# Patient Record
Sex: MALE | Race: WHITE | NOT HISPANIC OR LATINO | URBAN - METROPOLITAN AREA
[De-identification: names, ages, dates, MRNs, and addresses within clinical notes are randomized per-mention and may not be internally consistent; named-entity substitution may affect disease eponyms.]

---

## 2023-03-30 ENCOUNTER — HOSPITAL ENCOUNTER (INPATIENT)
Facility: HOSPITAL | Age: 73
LOS: 1 days | Discharge: HOME HEALTH | DRG: 563 | End: 2023-03-31
Attending: EMERGENCY MEDICINE | Admitting: HOSPITALIST
Payer: MEDICARE

## 2023-03-30 DIAGNOSIS — S42.202A: ICD-10-CM

## 2023-03-30 DIAGNOSIS — S42.355A CLOSED NONDISPLACED COMMINUTED FRACTURE OF SHAFT OF LEFT HUMERUS, INITIAL ENCOUNTER: Primary | ICD-10-CM

## 2023-03-30 DIAGNOSIS — G47.30 SLEEP APNEA IN ADULT: ICD-10-CM
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
## 2023-03-30 DIAGNOSIS — R07.9 CHEST PAIN: ICD-10-CM

## 2023-03-30 LAB
ALBUMIN SERPL BCP-MCNC: 4.2 G/DL (ref 3.5–5.2)
ALP SERPL-CCNC: 121 U/L (ref 55–135)
ALT SERPL W/O P-5'-P-CCNC: 31 U/L (ref 10–44)
ANION GAP SERPL CALC-SCNC: 14 MMOL/L (ref 8–16)
AST SERPL-CCNC: 25 U/L (ref 10–40)
BASOPHILS # BLD AUTO: 0.14 K/UL (ref 0–0.2)
BASOPHILS NFR BLD: 0.7 % (ref 0–1.9)
BILIRUB SERPL-MCNC: 0.5 MG/DL (ref 0.1–1)
BUN SERPL-MCNC: 16 MG/DL (ref 8–23)
CALCIUM SERPL-MCNC: 10.2 MG/DL (ref 8.7–10.5)
CHLORIDE SERPL-SCNC: 103 MMOL/L (ref 95–110)
CO2 SERPL-SCNC: 25 MMOL/L (ref 23–29)
CREAT SERPL-MCNC: 0.9 MG/DL (ref 0.5–1.4)
DIFFERENTIAL METHOD: ABNORMAL
EOSINOPHIL # BLD AUTO: 0.1 K/UL (ref 0–0.5)
EOSINOPHIL NFR BLD: 0.2 % (ref 0–8)
ERYTHROCYTE [DISTWIDTH] IN BLOOD BY AUTOMATED COUNT: 13.2 % (ref 11.5–14.5)
EST. GFR  (NO RACE VARIABLE): >60 ML/MIN/1.73 M^2
GLUCOSE SERPL-MCNC: 121 MG/DL (ref 70–110)
HCT VFR BLD AUTO: 49.7 % (ref 40–54)
HGB BLD-MCNC: 17.4 G/DL (ref 14–18)
IMM GRANULOCYTES # BLD AUTO: 0.17 K/UL (ref 0–0.04)
IMM GRANULOCYTES NFR BLD AUTO: 0.8 % (ref 0–0.5)
LYMPHOCYTES # BLD AUTO: 1.7 K/UL (ref 1–4.8)
LYMPHOCYTES NFR BLD: 8.2 % (ref 18–48)
MCH RBC QN AUTO: 32.3 PG (ref 27–31)
MCHC RBC AUTO-ENTMCNC: 35 G/DL (ref 32–36)
MCV RBC AUTO: 92 FL (ref 82–98)
MONOCYTES # BLD AUTO: 1.4 K/UL (ref 0.3–1)
MONOCYTES NFR BLD: 6.5 % (ref 4–15)
NEUTROPHILS # BLD AUTO: 17.7 K/UL (ref 1.8–7.7)
NEUTROPHILS NFR BLD: 83.6 % (ref 38–73)
NRBC BLD-RTO: 0 /100 WBC
PLATELET # BLD AUTO: 338 K/UL (ref 150–450)
PMV BLD AUTO: 9.8 FL (ref 9.2–12.9)
POTASSIUM SERPL-SCNC: 4 MMOL/L (ref 3.5–5.1)
PROT SERPL-MCNC: 9.3 G/DL (ref 6–8.4)
RBC # BLD AUTO: 5.39 M/UL (ref 4.6–6.2)
SODIUM SERPL-SCNC: 142 MMOL/L (ref 136–145)
WBC # BLD AUTO: 21.13 K/UL (ref 3.9–12.7)

## 2023-03-30 PROCEDURE — 94660 CPAP INITIATION&MGMT: CPT

## 2023-03-30 PROCEDURE — 11000001 HC ACUTE MED/SURG PRIVATE ROOM

## 2023-03-30 PROCEDURE — 99285 EMERGENCY DEPT VISIT HI MDM: CPT | Mod: 25

## 2023-03-30 PROCEDURE — 80053 COMPREHEN METABOLIC PANEL: CPT | Performed by: NURSE PRACTITIONER

## 2023-03-30 PROCEDURE — 25000003 PHARM REV CODE 250: Performed by: NURSE PRACTITIONER

## 2023-03-30 PROCEDURE — 99900035 HC TECH TIME PER 15 MIN (STAT)

## 2023-03-30 PROCEDURE — 96375 TX/PRO/DX INJ NEW DRUG ADDON: CPT

## 2023-03-30 PROCEDURE — 27000190 HC CPAP FULL FACE MASK W/VALVE

## 2023-03-30 PROCEDURE — 96374 THER/PROPH/DIAG INJ IV PUSH: CPT

## 2023-03-30 PROCEDURE — 63600175 PHARM REV CODE 636 W HCPCS: Performed by: NURSE PRACTITIONER

## 2023-03-30 PROCEDURE — 85025 COMPLETE CBC W/AUTO DIFF WBC: CPT | Performed by: NURSE PRACTITIONER

## 2023-03-30 RX ORDER — LISINOPRIL 40 MG/1
40 TABLET ORAL
COMMUNITY
Start: 2023-03-01

## 2023-03-30 RX ORDER — TRAZODONE HYDROCHLORIDE 50 MG/1
50 TABLET ORAL NIGHTLY
COMMUNITY
Start: 2023-01-03

## 2023-03-30 RX ORDER — AMLODIPINE BESYLATE 10 MG/1
10 TABLET ORAL
COMMUNITY
Start: 2023-03-01

## 2023-03-30 RX ORDER — MORPHINE SULFATE 4 MG/ML
4 INJECTION, SOLUTION INTRAMUSCULAR; INTRAVENOUS
Status: COMPLETED | OUTPATIENT
Start: 2023-03-30 | End: 2023-03-30

## 2023-03-30 RX ORDER — SODIUM CHLORIDE 0.9 % (FLUSH) 0.9 %
3 SYRINGE (ML) INJECTION EVERY 12 HOURS PRN
Status: DISCONTINUED | OUTPATIENT
Start: 2023-03-30 | End: 2023-03-31 | Stop reason: HOSPADM

## 2023-03-30 RX ORDER — AMOXICILLIN 250 MG
1 CAPSULE ORAL 2 TIMES DAILY PRN
Status: DISCONTINUED | OUTPATIENT
Start: 2023-03-30 | End: 2023-03-31 | Stop reason: HOSPADM

## 2023-03-30 RX ORDER — MORPHINE SULFATE 2 MG/ML
2 INJECTION, SOLUTION INTRAMUSCULAR; INTRAVENOUS EVERY 4 HOURS PRN
Status: DISCONTINUED | OUTPATIENT
Start: 2023-03-30 | End: 2023-03-31 | Stop reason: HOSPADM

## 2023-03-30 RX ORDER — ONDANSETRON 2 MG/ML
4 INJECTION INTRAMUSCULAR; INTRAVENOUS EVERY 8 HOURS PRN
Status: DISCONTINUED | OUTPATIENT
Start: 2023-03-30 | End: 2023-03-31 | Stop reason: HOSPADM

## 2023-03-30 RX ORDER — NALOXONE HCL 0.4 MG/ML
0.02 VIAL (ML) INJECTION
Status: DISCONTINUED | OUTPATIENT
Start: 2023-03-30 | End: 2023-03-31 | Stop reason: HOSPADM

## 2023-03-30 RX ORDER — LISINOPRIL 20 MG/1
40 TABLET ORAL DAILY
Status: DISCONTINUED | OUTPATIENT
Start: 2023-03-31 | End: 2023-03-31 | Stop reason: HOSPADM

## 2023-03-30 RX ORDER — DEXTROSE 40 %
15 GEL (GRAM) ORAL
Status: DISCONTINUED | OUTPATIENT
Start: 2023-03-30 | End: 2023-03-31 | Stop reason: HOSPADM

## 2023-03-30 RX ORDER — HYDROCODONE BITARTRATE AND ACETAMINOPHEN 10; 325 MG/1; MG/1
1 TABLET ORAL
Status: COMPLETED | OUTPATIENT
Start: 2023-03-30 | End: 2023-03-30

## 2023-03-30 RX ORDER — ATORVASTATIN CALCIUM 10 MG/1
20 TABLET, FILM COATED ORAL DAILY
Status: DISCONTINUED | OUTPATIENT
Start: 2023-03-31 | End: 2023-03-31 | Stop reason: HOSPADM

## 2023-03-30 RX ORDER — GLUCAGON 1 MG
1 KIT INJECTION
Status: DISCONTINUED | OUTPATIENT
Start: 2023-03-30 | End: 2023-03-31 | Stop reason: HOSPADM

## 2023-03-30 RX ORDER — HYDROCODONE BITARTRATE AND ACETAMINOPHEN 5; 325 MG/1; MG/1
1 TABLET ORAL EVERY 6 HOURS PRN
Status: DISCONTINUED | OUTPATIENT
Start: 2023-03-30 | End: 2023-03-31 | Stop reason: HOSPADM

## 2023-03-30 RX ORDER — ACETAMINOPHEN 325 MG/1
650 TABLET ORAL EVERY 8 HOURS PRN
Status: DISCONTINUED | OUTPATIENT
Start: 2023-03-30 | End: 2023-03-31 | Stop reason: HOSPADM

## 2023-03-30 RX ORDER — ATORVASTATIN CALCIUM 20 MG/1
1 TABLET, FILM COATED ORAL DAILY
COMMUNITY
Start: 2022-10-25

## 2023-03-30 RX ORDER — MAG HYDROX/ALUMINUM HYD/SIMETH 200-200-20
30 SUSPENSION, ORAL (FINAL DOSE FORM) ORAL 4 TIMES DAILY PRN
Status: DISCONTINUED | OUTPATIENT
Start: 2023-03-30 | End: 2023-03-31 | Stop reason: HOSPADM

## 2023-03-30 RX ORDER — ACETAMINOPHEN 650 MG/1
650 SUPPOSITORY RECTAL EVERY 6 HOURS PRN
Status: DISCONTINUED | OUTPATIENT
Start: 2023-03-30 | End: 2023-03-31 | Stop reason: HOSPADM

## 2023-03-30 RX ORDER — TALC
6 POWDER (GRAM) TOPICAL NIGHTLY PRN
Status: DISCONTINUED | OUTPATIENT
Start: 2023-03-30 | End: 2023-03-31 | Stop reason: HOSPADM

## 2023-03-30 RX ORDER — PROMETHAZINE HYDROCHLORIDE 25 MG/1
25 TABLET ORAL EVERY 6 HOURS PRN
Status: DISCONTINUED | OUTPATIENT
Start: 2023-03-30 | End: 2023-03-31 | Stop reason: HOSPADM

## 2023-03-30 RX ORDER — AMLODIPINE BESYLATE 10 MG/1
10 TABLET ORAL DAILY
Status: DISCONTINUED | OUTPATIENT
Start: 2023-03-31 | End: 2023-03-31 | Stop reason: HOSPADM

## 2023-03-30 RX ORDER — ONDANSETRON 2 MG/ML
4 INJECTION INTRAMUSCULAR; INTRAVENOUS
Status: COMPLETED | OUTPATIENT
Start: 2023-03-30 | End: 2023-03-30

## 2023-03-30 RX ORDER — DEXTROSE 40 %
30 GEL (GRAM) ORAL
Status: DISCONTINUED | OUTPATIENT
Start: 2023-03-30 | End: 2023-03-31 | Stop reason: HOSPADM

## 2023-03-30 RX ORDER — ATORVASTATIN CALCIUM 20 MG/1
20 TABLET, FILM COATED ORAL
COMMUNITY
Start: 2023-01-23

## 2023-03-30 RX ADMIN — HYDROCODONE BITARTRATE AND ACETAMINOPHEN 1 TABLET: 10; 325 TABLET ORAL at 06:03

## 2023-03-30 RX ADMIN — MORPHINE SULFATE 4 MG: 4 INJECTION INTRAVENOUS at 08:03

## 2023-03-30 RX ADMIN — ONDANSETRON 4 MG: 2 INJECTION INTRAMUSCULAR; INTRAVENOUS at 08:03

## 2023-03-30 NOTE — ED PROVIDER NOTES
Encounter Date: 3/30/2023       History     Chief Complaint   Patient presents with    Fall left shoulder pain     Per EMS pt had a slip trip fall, no LOC. Pt landed on his left shoulder. Pain rated 10/10 on movement, 3/10 at rest     Patient complains of left shoulder pain after a trip and fall at home.  Pain is worse with movement in the shoulder no gross deformity.  Denies mitigating factors not given anything prior to arrival.      Review of patient's allergies indicates:  No Known Allergies  No past medical history on file.  No past surgical history on file.  No family history on file.     Review of Systems   Constitutional:  Negative for fever.   HENT:  Negative for sore throat.    Respiratory:  Negative for shortness of breath.    Cardiovascular:  Negative for chest pain.   Gastrointestinal:  Negative for nausea.   Genitourinary:  Negative for dysuria.   Musculoskeletal:  Negative for back pain.   Skin:  Negative for rash.   Neurological:  Negative for weakness.   Hematological:  Does not bruise/bleed easily.     Physical Exam     Initial Vitals [03/30/23 1724]   BP Pulse Resp Temp SpO2   137/87 75 18 97.6 °F (36.4 °C) 96 %      MAP       --         Physical Exam    Nursing note and vitals reviewed.  Constitutional: He appears well-developed and well-nourished.   HENT:   Head: Normocephalic and atraumatic.   Eyes: Conjunctivae are normal. Pupils are equal, round, and reactive to light.   Neck: Neck supple.   Normal range of motion.  Cardiovascular:  Normal rate, regular rhythm, normal heart sounds and intact distal pulses.           Pulmonary/Chest: Breath sounds normal.   Abdominal: Abdomen is soft. There is no rebound and no guarding.   Musculoskeletal:         General: Normal range of motion.      Cervical back: Normal range of motion and neck supple.      Comments: Tenderness with range of motion left shoulder     Neurological: He is alert.   Skin: Skin is warm and dry.   Psychiatric: He has a normal mood  and affect. His behavior is normal. Thought content normal.       ED Course   Procedures  Labs Reviewed   CBC W/ AUTO DIFFERENTIAL - Abnormal; Notable for the following components:       Result Value    WBC 21.13 (*)     MCH 32.3 (*)     Immature Granulocytes 0.8 (*)     Gran # (ANC) 17.7 (*)     Immature Grans (Abs) 0.17 (*)     Mono # 1.4 (*)     Gran % 83.6 (*)     Lymph % 8.2 (*)     All other components within normal limits   COMPREHENSIVE METABOLIC PANEL - Abnormal; Notable for the following components:    Glucose 121 (*)     Total Protein 9.3 (*)     All other components within normal limits          Imaging Results              CT Shoulder Without Contrast Left (Final result)  Result time 03/30/23 19:30:18      Final result by Richar Arreola MD (03/30/23 19:30:18)                   Impression:      As above    All CT scans at this facility are performed  using dose modulation techniques as appropriate to performed exam including the following:  automated exposure control; adjustment of mA and/or kV according to the patients size (this includes techniques or standardized protocols for targeted exams where dose is matched to indication/reason for exam: i.e. extremities or head);  iterative reconstruction technique.      Electronically signed by: Richar Arreola  Date:    03/30/2023  Time:    19:30               Narrative:    EXAMINATION:  CT SHOULDER WITHOUT CONTRAST LEFT    CLINICAL HISTORY:  Fracture, shoulder;    TECHNIQUE:  CT of the left shoulder without    COMPARISON:  None    FINDINGS:  Comminuted fracture of the left humerus.  AC joint hypertrophy.  Glenoid is grossly unremarkable.                                       X-Ray Shoulder Trauma Left (Final result)  Result time 03/30/23 18:04:26      Final result by Richar Arreola MD (03/30/23 18:04:26)                   Impression:      As above      Electronically signed by: Richar Arreola  Date:    03/30/2023  Time:    18:04               Narrative:     EXAMINATION:  XR SHOULDER TRAUMA 3 VIEW LEFT    CLINICAL HISTORY:  Unspecified fall, initial encounter    TECHNIQUE:  Two or three views of the left shoulder were performed.    COMPARISON:  None    FINDINGS:  Comminuted fracture of the left humeral head.  AC joint hypertrophy.                                       Medications   HYDROcodone-acetaminophen  mg per tablet 1 tablet (1 tablet Oral Given 3/30/23 1838)   morphine injection 4 mg (4 mg Intravenous Given 3/30/23 2022)   ondansetron injection 4 mg (4 mg Intravenous Given 3/30/23 2020)     Medical Decision Making:   Secure chat with ortho on-call suggest that the patient should be hospitalized if he is unsafe at home.  The family I spoke with states that they are concerned about him falling again.  Patient admitted to hospital medicine for surgery consult tomorrow                        Clinical Impression:   Final diagnoses:  [W19.XXXA] Fall  [S42.355A] Closed nondisplaced comminuted fracture of shaft of left humerus, initial encounter (Primary)        ED Disposition Condition    Admit Stable                Shaun Lo NP  03/30/23 8118

## 2023-03-31 VITALS
WEIGHT: 226.88 LBS | RESPIRATION RATE: 17 BRPM | TEMPERATURE: 99 F | HEIGHT: 66 IN | BODY MASS INDEX: 36.46 KG/M2 | SYSTOLIC BLOOD PRESSURE: 140 MMHG | HEART RATE: 87 BPM | DIASTOLIC BLOOD PRESSURE: 77 MMHG | OXYGEN SATURATION: 93 %

## 2023-03-31 PROBLEM — E78.5 HLD (HYPERLIPIDEMIA): Status: ACTIVE | Noted: 2023-03-31

## 2023-03-31 PROBLEM — G47.30 SLEEP APNEA IN ADULT: Status: ACTIVE | Noted: 2023-03-31

## 2023-03-31 PROBLEM — S42.202A: Status: ACTIVE | Noted: 2023-03-31

## 2023-03-31 PROBLEM — E66.01 CLASS 2 SEVERE OBESITY DUE TO EXCESS CALORIES WITH SERIOUS COMORBIDITY AND BODY MASS INDEX (BMI) OF 36.0 TO 36.9 IN ADULT: Status: ACTIVE | Noted: 2023-03-31

## 2023-03-31 PROBLEM — I10 PRIMARY HYPERTENSION: Status: ACTIVE | Noted: 2023-03-31

## 2023-03-31 PROBLEM — E66.812 CLASS 2 SEVERE OBESITY DUE TO EXCESS CALORIES WITH SERIOUS COMORBIDITY AND BODY MASS INDEX (BMI) OF 36.0 TO 36.9 IN ADULT: Status: ACTIVE | Noted: 2023-03-31

## 2023-03-31 PROBLEM — S42.292A FRACTURE OF HUMERAL HEAD, CLOSED, LEFT, INITIAL ENCOUNTER: Status: ACTIVE | Noted: 2023-03-31

## 2023-03-31 LAB
ALBUMIN SERPL BCP-MCNC: 3.6 G/DL (ref 3.5–5.2)
ALP SERPL-CCNC: 94 U/L (ref 55–135)
ALT SERPL W/O P-5'-P-CCNC: 25 U/L (ref 10–44)
ANION GAP SERPL CALC-SCNC: 13 MMOL/L (ref 8–16)
AST SERPL-CCNC: 19 U/L (ref 10–40)
BASOPHILS # BLD AUTO: 0.1 K/UL (ref 0–0.2)
BASOPHILS NFR BLD: 0.7 % (ref 0–1.9)
BILIRUB SERPL-MCNC: 0.6 MG/DL (ref 0.1–1)
BNP SERPL-MCNC: 55 PG/ML (ref 0–99)
BUN SERPL-MCNC: 15 MG/DL (ref 8–23)
CALCIUM SERPL-MCNC: 9.2 MG/DL (ref 8.7–10.5)
CHLORIDE SERPL-SCNC: 107 MMOL/L (ref 95–110)
CO2 SERPL-SCNC: 23 MMOL/L (ref 23–29)
CREAT SERPL-MCNC: 0.9 MG/DL (ref 0.5–1.4)
DIFFERENTIAL METHOD: ABNORMAL
EOSINOPHIL # BLD AUTO: 0.1 K/UL (ref 0–0.5)
EOSINOPHIL NFR BLD: 0.3 % (ref 0–8)
ERYTHROCYTE [DISTWIDTH] IN BLOOD BY AUTOMATED COUNT: 13.2 % (ref 11.5–14.5)
EST. GFR  (NO RACE VARIABLE): >60 ML/MIN/1.73 M^2
GLUCOSE SERPL-MCNC: 112 MG/DL (ref 70–110)
HCT VFR BLD AUTO: 48.9 % (ref 40–54)
HGB BLD-MCNC: 16.7 G/DL (ref 14–18)
IMM GRANULOCYTES # BLD AUTO: 0.04 K/UL (ref 0–0.04)
IMM GRANULOCYTES NFR BLD AUTO: 0.3 % (ref 0–0.5)
LYMPHOCYTES # BLD AUTO: 2.3 K/UL (ref 1–4.8)
LYMPHOCYTES NFR BLD: 16.3 % (ref 18–48)
MCH RBC QN AUTO: 32 PG (ref 27–31)
MCHC RBC AUTO-ENTMCNC: 34.2 G/DL (ref 32–36)
MCV RBC AUTO: 94 FL (ref 82–98)
MONOCYTES # BLD AUTO: 1.2 K/UL (ref 0.3–1)
MONOCYTES NFR BLD: 8 % (ref 4–15)
NEUTROPHILS # BLD AUTO: 10.6 K/UL (ref 1.8–7.7)
NEUTROPHILS NFR BLD: 74.4 % (ref 38–73)
NRBC BLD-RTO: 0 /100 WBC
PLATELET # BLD AUTO: 285 K/UL (ref 150–450)
PMV BLD AUTO: 10.5 FL (ref 9.2–12.9)
POTASSIUM SERPL-SCNC: 3.9 MMOL/L (ref 3.5–5.1)
PROT SERPL-MCNC: 7.9 G/DL (ref 6–8.4)
RBC # BLD AUTO: 5.22 M/UL (ref 4.6–6.2)
SODIUM SERPL-SCNC: 143 MMOL/L (ref 136–145)
TROPONIN I SERPL DL<=0.01 NG/ML-MCNC: <0.006 NG/ML (ref 0–0.03)
WBC # BLD AUTO: 14.3 K/UL (ref 3.9–12.7)

## 2023-03-31 PROCEDURE — 99223 1ST HOSP IP/OBS HIGH 75: CPT | Mod: ,,, | Performed by: ORTHOPAEDIC SURGERY

## 2023-03-31 PROCEDURE — 36415 COLL VENOUS BLD VENIPUNCTURE: CPT | Performed by: HOSPITALIST

## 2023-03-31 PROCEDURE — 80053 COMPREHEN METABOLIC PANEL: CPT | Performed by: HOSPITALIST

## 2023-03-31 PROCEDURE — 84484 ASSAY OF TROPONIN QUANT: CPT | Performed by: HOSPITALIST

## 2023-03-31 PROCEDURE — 97116 GAIT TRAINING THERAPY: CPT

## 2023-03-31 PROCEDURE — 97162 PT EVAL MOD COMPLEX 30 MIN: CPT

## 2023-03-31 PROCEDURE — 97530 THERAPEUTIC ACTIVITIES: CPT

## 2023-03-31 PROCEDURE — 83880 ASSAY OF NATRIURETIC PEPTIDE: CPT | Performed by: HOSPITALIST

## 2023-03-31 PROCEDURE — 99223 PR INITIAL HOSPITAL CARE,LEVL III: ICD-10-PCS | Mod: ,,, | Performed by: ORTHOPAEDIC SURGERY

## 2023-03-31 PROCEDURE — 93005 ELECTROCARDIOGRAM TRACING: CPT

## 2023-03-31 PROCEDURE — 94761 N-INVAS EAR/PLS OXIMETRY MLT: CPT

## 2023-03-31 PROCEDURE — 99900035 HC TECH TIME PER 15 MIN (STAT)

## 2023-03-31 PROCEDURE — 85025 COMPLETE CBC W/AUTO DIFF WBC: CPT | Performed by: HOSPITALIST

## 2023-03-31 PROCEDURE — 93010 EKG 12-LEAD: ICD-10-PCS | Mod: ,,, | Performed by: INTERNAL MEDICINE

## 2023-03-31 PROCEDURE — 25000003 PHARM REV CODE 250: Performed by: HOSPITALIST

## 2023-03-31 PROCEDURE — 93010 ELECTROCARDIOGRAM REPORT: CPT | Mod: ,,, | Performed by: INTERNAL MEDICINE

## 2023-03-31 RX ORDER — HYDROCODONE BITARTRATE AND ACETAMINOPHEN 5; 325 MG/1; MG/1
1 TABLET ORAL EVERY 8 HOURS PRN
Qty: 15 TABLET | Refills: 0 | Status: SHIPPED | OUTPATIENT
Start: 2023-03-31

## 2023-03-31 RX ADMIN — HYDROCODONE BITARTRATE AND ACETAMINOPHEN 1 TABLET: 5; 325 TABLET ORAL at 04:03

## 2023-03-31 RX ADMIN — AMLODIPINE BESYLATE 10 MG: 10 TABLET ORAL at 08:03

## 2023-03-31 RX ADMIN — Medication 6 MG: at 01:03

## 2023-03-31 RX ADMIN — LISINOPRIL 40 MG: 20 TABLET ORAL at 08:03

## 2023-03-31 RX ADMIN — ATORVASTATIN CALCIUM 20 MG: 10 TABLET, FILM COATED ORAL at 08:03

## 2023-03-31 RX ADMIN — HYDROCODONE BITARTRATE AND ACETAMINOPHEN 1 TABLET: 5; 325 TABLET ORAL at 01:03

## 2023-03-31 RX ADMIN — HYDROCODONE BITARTRATE AND ACETAMINOPHEN 1 TABLET: 5; 325 TABLET ORAL at 08:03

## 2023-03-31 NOTE — PLAN OF CARE
EVAL AND TX COMPLETED: facilitated bed mobility with mod A, transfers with min A. Ambulated 45 ft x 2 min A with QC, 15ft x 2 min A with HHA, attempted gait with L PRW but unable to tolerate x 3 ft. Recommend 24 hr SPV, BSC, wheelchair. Also recommend that pt do not return to 2nd floor condo. MD and Marg[place] notified.

## 2023-03-31 NOTE — ASSESSMENT & PLAN NOTE
Currently normotensive.   Plan:  -Optimize pain control   -Continue home medications, titrate as needed   -Monitor BP  -Low salt/cardiac diet when not NPO  -IV hydralazine prn for SBP>160 or DBP>90

## 2023-03-31 NOTE — ASSESSMENT & PLAN NOTE
Patient is chronically on statin.will continue for now. Last Lipid Panel: No results found for: CHOL, HDL, LDLCALC, TRIG, CHOLHDL  Plan:  -Continue home medication  -low fat/low calorie diet

## 2023-03-31 NOTE — ASSESSMENT & PLAN NOTE
Patient presented following ground level fall and acute left shoulder pain was found to have sustained an acute traumatic comminuted fracture involving his left femur head noted on imaging.  Orthopedics consulted with recommendations to obtain further imaging via CT. Patient remains hemodynamically stable and neurovascularly intact.  Patient placed in sling and swath and awaiting further surgical recommendations pending morning rounds by Orthopedics and CT of left upper extremity.  Plan:  -NPO  -Continue current pain regimen, titrate as needed  -Bowel regimen  -Bedrest  -Wound care   -None weightbearing  -Continue NGT decompression  -Continue sling  -IVFs prn  -Antiemetics prn  -Tylenol as needed for fever   -f/u CT and orthopedics  -PT/OT

## 2023-03-31 NOTE — PLAN OF CARE
AAOX4. Pain is being managed on going. LUE is elevated in sling and on pillow. Pt remained free from fall and injury. No sign of any distress noted. Safety precautions alert. Pt asked to call for assistance if needed. IV access clean dry and intact saline locked. Chart checked reviewed. VSS. Plan of care continued.

## 2023-03-31 NOTE — CONSULTS
"  DATE OF INJURY 2023    CC     LEFT Shoulder pain     HPI    Mr. Mckenna is a 71y/o RHD retiree who we are asked to see regarding his left proximal humerus fracture.  His son Justin is at the bedside.    Yesterday morning he was seen in the cards office and had an echocardiogram.  His son reports they were told he may need a valve replacement in next few years.  His son states the only reason his father was out and about was for the doctor's appointment.     After the appointment they went out to lunch, and as they were leaving lunch he fell and landed on his left shoulder.      He has pain at the shoulder that does radiate along the arm to the elbow but not into the forearm.  Denies any paresthesias.  Does have a history of multiple dislocations of the left shoulder and had one within last 5 years.      He uses a cane in the house and in the community because of bilateral knee DJD.  He does have a full flight of stairs to get into his apartment.  He already has a lift chair recliner.       PAST MEDICAL HISTORY  HTN, ELIZABETH w/CPAP, "valve leak",  hyperlipidemia, b/l knee DJD     PAST SURGICAL HISTORY  Splenectomy at age 6     ALLERGIES      NKDA     MEDICATIONS    Takes baby ASA daily     SOCIAL HISTORY   Lives alone.  Quit drink 2 years ago.  Does not smoke or use illegal drugs.     FAMILY HISTORY  Father had CAD, prostate cancer, diabetes and is .     Mother  a few years ago and  of "old age"     REVIEW OF SYSTEMS:      GEN:   Denies fever, chills, malaise, unexpected weight changes.    HEENT: Denies headaches, diplopia, rhinnorea, epistaxis,          difficulty swallowing.    CV:    Denies hypertension, chest pain, CAD, MI, palpitations,         PVD  ENDO:  Denies diabetes, temperature intolerance  GI:    Denies reflux, nausea, vomiting, constipation, diarrhea.       Denies dysuria/nocturia/prostate problems  NEURO: Denies stroke, epilepsy/seizures, paralysis/paresis or         " "neuropathy.  PSYCH  Denies PTSD,depression and ADD  PULM:  Denies sleep apnea, shortness of breath, wheezing, cough,         hemoptysis, COPD, asthma  MSK:   See HPI. Denies arthritis, myalgias, fracture.     PHYSICAL EXAMINATION     Vitals:    03/31/23 0511 03/31/23 0731 03/31/23 0744 03/31/23 0842   BP: 131/73 131/73     BP Location: Right arm      Patient Position: Lying Lying     Pulse: 77 70 70    Resp: 18 15 16 17   Temp: 98.2 °F (36.8 °C) 98.3 °F (36.8 °C)     TempSrc: Oral Oral     SpO2: 96% (!) 94% (!) 94%    Weight: 102.9 kg (226 lb 13.7 oz)      Height:          GEN            NAD, well appearing     PSYCH          A&Ox3, pleasant and cooperative     LEFT UE   In sling.  Skin intact.  Moderate swelling.  No bruising at this time.  Arm/forearm soft.  NTTP about the elbow/forearm.  TTP about the proximal humerus.  Pain with attempted shoulder ROM.  Radial/ulnar/median nerves intact motor/sensory.  Cap refill     DIAGNOSTIC IMAGING  LEFT Shoulder Xr Personally reviewed.  Shows comminuted impacted multipart fracture of the proximal humerus.   AC joint DJD    LEFT Shoulder CT Scan  Shows glenohumeral joint concentrically reduced with mild loss of glenohumeral joint space.  Impacted, comminuted proximal humerus fracture again noted.      DISCUSSION  Diagnostic imaging, clinical findings, and patient's symptoms reviewed  and correlated.  Discussed non-operative treatments including sling, ice, ROM as pain decreases followed by PT.  Discussed operative interventions of shoulder replacement and it's post-operative course and restrictions.  Discussed that whether he elects for non-operative treatment or operative treatment, he will lose ROM of this shoulder but will be able to reach overhead and get hand to mouth.  Discussed how his medical conditions including "leaky valve" and ELIZABETH add to the risks of anesthesia and the recovery from surgery.  Discussed the only restriction during non-operative treatment is " "limiting activity by pain.      Pt and son given an opportunity to ask questions.  His son stated he would like non-operative treatment.     Will allow them to discuss and return later.      DIAGNOSIS:  1.  LEFT proximal humerus fracture, closed, displaced, initial  2.  ELIZABETH w/CPAP  3.  "Leaky valve"        PLAN:  1.  No surgery planned at this time  2.  Sling for comfort, may open to do ROM of elbow/wrist as pain allows.  3.  Patient encouraged to move hand/wrist as much as possible to help with swelling/stiffness  4.  Ice to left shoulder PRN pain  5.  Will follow         "

## 2023-03-31 NOTE — PROGRESS NOTES
"Patient seen in follow-up for review of treatment options for his left proximal humerus fracture.    His son Nirmal is at the bedside as is a sister.    Mr. Alvarez stated they've talked about it and he does NOT want surgery for the shoulder at this time.    I reviewed the injury and sling usage, that he can use ice for the pain/swelling.      He asked if he can use his voltaren gel and another topical cream to which I replied yes.     DIAGNOSIS:  1.  LEFT proximal humerus fracture, closed, displaced, initial  2.  ELIZABETH w/CPAP  3.  "Leaky valve"         PLAN:  1.  No surgery planned   2.  Sling for comfort, may open to do ROM of elbow/wrist as pain allows.  3.  Patient encouraged to move hand/wrist as much as possible to help with swelling/stiffness  4.  Ice to left shoulder PRN pain  5.  Will arrange follow-up in the [facility] in 2-3 weeks        [facility]      [facility] [address] [place] [address]      Phone [phone]      Fax [phone]    "

## 2023-03-31 NOTE — H&P
[facility] [place] [facility]  History & Physical    Patient Name: Rodolfo Alvarez  MRN: [id number]  Patient Class: IP- Inpatient  Admission Date: 3/30/2023  Attending Physician: Manuel Tian MD   Primary Care Provider: Primary Doctor No         Patient information was obtained from patient, past medical records and ER records.     Subjective:     Principal Problem:<principal problem not specified>    Chief Complaint:   Chief Complaint   Patient presents with    Fall left shoulder pain     Per EMS pt had a slip trip fall, no LOC. Pt landed on his left shoulder. Pain rated 10/10 on movement, 3/10 at rest        HPI: Rodolfo Alvarez is a 72 y.o. male with a PMH  has a past medical history of HLD (hyperlipidemia) and Hypertension. who presented to the ED with left shoulder pain following ground level fall earlier today.  Patient stated he was walking out of [employer] and fell onto his left shoulder after tripping and falling.  Patient denied endorsing any head trauma, loss of consciousness, and reported also endorsing abrasions on his upper extremities.  Patient reported endorsing immediate pain throughout his left shoulder described as hurting/throbbing in nature, constant, rated 10/10 in severity, radiating down his arm with no associated numbness, tingling, or discoloration but did have decreased range of motion secondary to pain.  All other review of systems negative except as noted above.  Prior to the incident, patient reported being in his usual state health no other concerns or complaints.  With orthopedics was consulted after patient was found to have sustained an acute traumatic left humeral head fracture noted on imaging and recommended admission to hospital medicine with further recommendations pending CT upper extremity.      PCP: Primary Doctor No        No past medical history on file.    No past surgical history on file.    Review of patient's allergies indicates:  No Known  Allergies    No current facility-administered medications on file prior to encounter.     Current Outpatient Medications on File Prior to Encounter   Medication Sig    atorvastatin (LIPITOR) 20 MG tablet Take 1 tablet by mouth once daily.    amLODIPine (NORVASC) 10 MG tablet Take 10 mg by mouth.    atorvastatin (LIPITOR) 20 MG tablet Take 20 mg by mouth.    lisinopriL (PRINIVIL,ZESTRIL) 40 MG tablet Take 40 mg by mouth.    traZODone (DESYREL) 50 MG tablet Take 50 mg by mouth every evening.     Family History    None       Tobacco Use    Smoking status: Not on file    Smokeless tobacco: Not on file   Substance and Sexual Activity    Alcohol use: Not on file    Drug use: Not on file    Sexual activity: Not on file     Review of Systems   All other systems reviewed and are negative.  Objective:     Vital Signs (Most Recent):  Temp: 98.4 °F (36.9 °C) (03/30/23 2145)  Pulse: 85 (03/30/23 2224)  Resp: 16 (03/31/23 0110)  BP: 137/73 (03/30/23 2145)  SpO2: 97 % (03/30/23 2224) Vital Signs (24h Range):  Temp:  [97.6 °F (36.4 °C)-98.4 °F (36.9 °C)] 98.4 °F (36.9 °C)  Pulse:  [75-85] 85  Resp:  [16-22] 16  SpO2:  [96 %-97 %] 97 %  BP: (137)/(73-87) 137/73     Weight: 103.3 kg (227 lb 11.8 oz)  Body mass index is 36.76 kg/m².    Physical Exam  Vitals reviewed.   Constitutional:       General: He is not in acute distress.     Appearance: Normal appearance. He is obese. He is not ill-appearing, toxic-appearing or diaphoretic.   HENT:      Head: Normocephalic and atraumatic.      Right Ear: External ear normal.      Left Ear: External ear normal.      Nose: Nose normal. No congestion or rhinorrhea.      Mouth/Throat:      Mouth: Mucous membranes are moist.      Pharynx: Oropharynx is clear. No oropharyngeal exudate or posterior oropharyngeal erythema.   Eyes:      General: No scleral icterus.     Extraocular Movements: Extraocular movements intact.      Conjunctiva/sclera: Conjunctivae normal.      Pupils: Pupils are  equal, round, and reactive to light.   Neck:      Vascular: No carotid bruit.   Cardiovascular:      Rate and Rhythm: Normal rate and regular rhythm.      Pulses: Normal pulses.      Heart sounds: Normal heart sounds. No murmur heard.    No friction rub. No gallop.   Pulmonary:      Effort: Pulmonary effort is normal. No respiratory distress.      Breath sounds: Normal breath sounds. No stridor. No wheezing, rhonchi or rales.   Chest:      Chest wall: No tenderness.   Abdominal:      General: Abdomen is flat. Bowel sounds are normal. There is no distension.      Palpations: Abdomen is soft. There is no mass.      Tenderness: There is no abdominal tenderness. There is no guarding or rebound.      Hernia: No hernia is present.   Musculoskeletal:         General: Tenderness and signs of injury present. No swelling or deformity.      Cervical back: Normal range of motion and neck supple. No rigidity or tenderness.      Comments: Decreased range of motion of left upper extremity with diffuse TTP throughout left shoulder.  Left upper extremity currently in sling/swath.   Lymphadenopathy:      Cervical: No cervical adenopathy.   Skin:     General: Skin is warm and dry.      Capillary Refill: Capillary refill takes less than 2 seconds.      Coloration: Skin is not jaundiced or pale.      Findings: Rash present. No bruising, erythema or lesion.      Comments: Abrasions noted on bilateral upper extremities   Neurological:      Mental Status: He is alert and oriented to person, place, and time. Mental status is at baseline.      Cranial Nerves: No cranial nerve deficit.      Sensory: No sensory deficit.      Motor: Weakness present.      Coordination: Coordination normal.      Comments: Patient with 5/5 strength on all extremities with exception of inability to fully assess left upper extremity secondary to exacerbation of pain given recent trauma.  Patient has intact left hand  strength.  Sensation to light touch grossly  intact throughout.   Psychiatric:         Mood and Affect: Mood normal.         Behavior: Behavior normal.         Thought Content: Thought content normal.         Judgment: Judgment normal.         CRANIAL NERVES     CN III, IV, VI   Pupils are equal, round, and reactive to light.     Significant Labs: All pertinent labs within the past 24 hours have been reviewed.    Significant Imaging: I have reviewed all pertinent imaging results/findings within the past 24 hours.    LABS:  Recent Results (from the past 24 hour(s))   CBC auto differential    Collection Time: 03/30/23  8:18 PM   Result Value Ref Range    WBC 21.13 (H) 3.90 - 12.70 K/uL    RBC 5.39 4.60 - 6.20 M/uL    Hemoglobin 17.4 14.0 - 18.0 g/dL    Hematocrit 49.7 40.0 - 54.0 %    MCV 92 82 - 98 fL    MCH 32.3 (H) 27.0 - 31.0 pg    MCHC 35.0 32.0 - 36.0 g/dL    RDW 13.2 11.5 - 14.5 %    Platelets 338 150 - 450 K/uL    MPV 9.8 9.2 - 12.9 fL    Immature Granulocytes 0.8 (H) 0.0 - 0.5 %    Gran # (ANC) 17.7 (H) 1.8 - 7.7 K/uL    Immature Grans (Abs) 0.17 (H) 0.00 - 0.04 K/uL    Lymph # 1.7 1.0 - 4.8 K/uL    Mono # 1.4 (H) 0.3 - 1.0 K/uL    Eos # 0.1 0.0 - 0.5 K/uL    Baso # 0.14 0.00 - 0.20 K/uL    nRBC 0 0 /100 WBC    Gran % 83.6 (H) 38.0 - 73.0 %    Lymph % 8.2 (L) 18.0 - 48.0 %    Mono % 6.5 4.0 - 15.0 %    Eosinophil % 0.2 0.0 - 8.0 %    Basophil % 0.7 0.0 - 1.9 %    Differential Method Automated    Comprehensive metabolic panel    Collection Time: 03/30/23  8:18 PM   Result Value Ref Range    Sodium 142 136 - 145 mmol/L    Potassium 4.0 3.5 - 5.1 mmol/L    Chloride 103 95 - 110 mmol/L    CO2 25 23 - 29 mmol/L    Glucose 121 (H) 70 - 110 mg/dL    BUN 16 8 - 23 mg/dL    Creatinine 0.9 0.5 - 1.4 mg/dL    Calcium 10.2 8.7 - 10.5 mg/dL    Total Protein 9.3 (H) 6.0 - 8.4 g/dL    Albumin 4.2 3.5 - 5.2 g/dL    Total Bilirubin 0.5 0.1 - 1.0 mg/dL    Alkaline Phosphatase 121 55 - 135 U/L    AST 25 10 - 40 U/L    ALT 31 10 - 44 U/L    Anion Gap 14 8 - 16 mmol/L     eGFR >60 >60 mL/min/1.73 m^2       RADIOLOGY  CT Shoulder Without Contrast Left    Result Date: 3/30/2023  EXAMINATION: CT SHOULDER WITHOUT CONTRAST LEFT CLINICAL HISTORY: Fracture, shoulder; TECHNIQUE: CT of the left shoulder without COMPARISON: None FINDINGS: Comminuted fracture of the left humerus.  AC joint hypertrophy.  Glenoid is grossly unremarkable.     As above All CT scans at this facility are performed  using dose modulation techniques as appropriate to performed exam including the following:  automated exposure control; adjustment of mA and/or kV according to the patients size (this includes techniques or standardized protocols for targeted exams where dose is matched to indication/reason for exam: i.e. extremities or head);  iterative reconstruction technique. Electronically signed by: Richar Arreola Date:    03/30/2023 Time:    19:30    X-Ray Shoulder Trauma Left    Result Date: 3/30/2023  EXAMINATION: XR SHOULDER TRAUMA 3 VIEW LEFT CLINICAL HISTORY: Unspecified fall, initial encounter TECHNIQUE: Two or three views of the left shoulder were performed. COMPARISON: None FINDINGS: Comminuted fracture of the left humeral head.  AC joint hypertrophy.     As above Electronically signed by: Richar Arreola Date:    03/30/2023 Time:    18:04      EKG    MICROBIOLOGY    MDM      Assessment/Plan:     Fracture of humeral head, closed, left, initial encounter  Patient presented following ground level fall and acute left shoulder pain was found to have sustained an acute traumatic comminuted fracture involving his left femur head noted on imaging.  Orthopedics consulted with recommendations to obtain further imaging via CT. Patient remains hemodynamically stable and neurovascularly intact.  Patient placed in sling and swath and awaiting further surgical recommendations pending morning rounds by Orthopedics and CT of left upper extremity.  Plan:  -NPO  -Continue current pain regimen, titrate as needed  -Bowel  regimen  -Bedrest  -Wound care   -None weightbearing  -Continue NGT decompression  -Continue sling  -IVFs prn  -Antiemetics prn  -Tylenol as needed for fever   -f/u CT and orthopedics  -PT/OT       Primary hypertension  Currently normotensive.   Plan:  -Optimize pain control   -Continue home medications, titrate as needed   -Monitor BP  -Low salt/cardiac diet when not NPO  -IV hydralazine prn for SBP>160 or DBP>90           HLD (hyperlipidemia)  Patient is chronically on statin.will continue for now. Last Lipid Panel: No results found for: CHOL, HDL, LDLCALC, TRIG, CHOLHDL  Plan:  -Continue home medication  -low fat/low calorie diet      Class 2 severe obesity due to excess calories with serious comorbidity and body mass index (BMI) of 36.0 to 36.9 in adult  Body mass index is 36.76 kg/m². Elevation likely secondary to increased calorie intake and sedentary lifestyle. Patient educated on morbidity and mortality in regards to elevated BMI and stressed importance of diet and exercise.   Plan:  -low fat/low calorie diet       VTE Risk Mitigation (From admission, onward)         Ordered     Reason for No Pharmacological VTE Prophylaxis  Once        Question:  Reasons:  Answer:  Physician Provided (leave comment)  Comment:  pending surgical intervention    03/30/23 2219     IP VTE HIGH RISK PATIENT  Once         03/30/23 2219     Place sequential compression device  Until discontinued         03/30/23 2219              //Core Measures   -DVT proph: SCDs, withholding anticoagulation pending surgical intervention   -Code status: Full    -Surrogate: spouse      Components of this note were documented using a voice recognition system and are subject to errors not corrected at the time the document was proof read. Please contact the author for any clarifications.        Manuel Tian MD  Department of Hospital Medicine  [facility]

## 2023-03-31 NOTE — HPI
Rodolfo Alvarez is a 72 y.o. male with a PMH  has a past medical history of HLD (hyperlipidemia) and Hypertension. who presented to the ED with left shoulder pain following ground level fall earlier today.  Patient stated he was walking out of [employer] and fell onto his left shoulder after tripping and falling.  Patient denied endorsing any head trauma, loss of consciousness, and reported also endorsing abrasions on his upper extremities.  Patient reported endorsing immediate pain throughout his left shoulder described as hurting/throbbing in nature, constant, rated 10/10 in severity, radiating down his arm with no associated numbness, tingling, or discoloration but did have decreased range of motion secondary to pain.  All other review of systems negative except as noted above.  Prior to the incident, patient reported being in his usual state health no other concerns or complaints.  With orthopedics was consulted after patient was found to have sustained an acute traumatic left humeral head fracture noted on imaging and recommended admission to hospital medicine with further recommendations pending CT upper extremity.      PCP: Primary Doctor No

## 2023-03-31 NOTE — SUBJECTIVE & OBJECTIVE
No past medical history on file.    No past surgical history on file.    Review of patient's allergies indicates:  No Known Allergies    No current facility-administered medications on file prior to encounter.     Current Outpatient Medications on File Prior to Encounter   Medication Sig    atorvastatin (LIPITOR) 20 MG tablet Take 1 tablet by mouth once daily.    amLODIPine (NORVASC) 10 MG tablet Take 10 mg by mouth.    atorvastatin (LIPITOR) 20 MG tablet Take 20 mg by mouth.    lisinopriL (PRINIVIL,ZESTRIL) 40 MG tablet Take 40 mg by mouth.    traZODone (DESYREL) 50 MG tablet Take 50 mg by mouth every evening.     Family History    None       Tobacco Use    Smoking status: Not on file    Smokeless tobacco: Not on file   Substance and Sexual Activity    Alcohol use: Not on file    Drug use: Not on file    Sexual activity: Not on file     Review of Systems   All other systems reviewed and are negative.  Objective:     Vital Signs (Most Recent):  Temp: 98.4 °F (36.9 °C) (03/30/23 2145)  Pulse: 85 (03/30/23 2224)  Resp: 16 (03/31/23 0110)  BP: 137/73 (03/30/23 2145)  SpO2: 97 % (03/30/23 2224) Vital Signs (24h Range):  Temp:  [97.6 °F (36.4 °C)-98.4 °F (36.9 °C)] 98.4 °F (36.9 °C)  Pulse:  [75-85] 85  Resp:  [16-22] 16  SpO2:  [96 %-97 %] 97 %  BP: (137)/(73-87) 137/73     Weight: 103.3 kg (227 lb 11.8 oz)  Body mass index is 36.76 kg/m².    Physical Exam  Vitals reviewed.   Constitutional:       General: He is not in acute distress.     Appearance: Normal appearance. He is obese. He is not ill-appearing, toxic-appearing or diaphoretic.   HENT:      Head: Normocephalic and atraumatic.      Right Ear: External ear normal.      Left Ear: External ear normal.      Nose: Nose normal. No congestion or rhinorrhea.      Mouth/Throat:      Mouth: Mucous membranes are moist.      Pharynx: Oropharynx is clear. No oropharyngeal exudate or posterior oropharyngeal erythema.   Eyes:      General: No scleral icterus.     Extraocular  Movements: Extraocular movements intact.      Conjunctiva/sclera: Conjunctivae normal.      Pupils: Pupils are equal, round, and reactive to light.   Neck:      Vascular: No carotid bruit.   Cardiovascular:      Rate and Rhythm: Normal rate and regular rhythm.      Pulses: Normal pulses.      Heart sounds: Normal heart sounds. No murmur heard.    No friction rub. No gallop.   Pulmonary:      Effort: Pulmonary effort is normal. No respiratory distress.      Breath sounds: Normal breath sounds. No stridor. No wheezing, rhonchi or rales.   Chest:      Chest wall: No tenderness.   Abdominal:      General: Abdomen is flat. Bowel sounds are normal. There is no distension.      Palpations: Abdomen is soft. There is no mass.      Tenderness: There is no abdominal tenderness. There is no guarding or rebound.      Hernia: No hernia is present.   Musculoskeletal:         General: Tenderness and signs of injury present. No swelling or deformity.      Cervical back: Normal range of motion and neck supple. No rigidity or tenderness.      Comments: Decreased range of motion of left upper extremity with diffuse TTP throughout left shoulder.  Left upper extremity currently in sling/swath.   Lymphadenopathy:      Cervical: No cervical adenopathy.   Skin:     General: Skin is warm and dry.      Capillary Refill: Capillary refill takes less than 2 seconds.      Coloration: Skin is not jaundiced or pale.      Findings: Rash present. No bruising, erythema or lesion.      Comments: Abrasions noted on bilateral upper extremities   Neurological:      Mental Status: He is alert and oriented to person, place, and time. Mental status is at baseline.      Cranial Nerves: No cranial nerve deficit.      Sensory: No sensory deficit.      Motor: Weakness present.      Coordination: Coordination normal.      Comments: Patient with 5/5 strength on all extremities with exception of inability to fully assess left upper extremity secondary to  exacerbation of pain given recent trauma.  Patient has intact left hand  strength.  Sensation to light touch grossly intact throughout.   Psychiatric:         Mood and Affect: Mood normal.         Behavior: Behavior normal.         Thought Content: Thought content normal.         Judgment: Judgment normal.         CRANIAL NERVES     CN III, IV, VI   Pupils are equal, round, and reactive to light.     Significant Labs: All pertinent labs within the past 24 hours have been reviewed.    Significant Imaging: I have reviewed all pertinent imaging results/findings within the past 24 hours.    LABS:  Recent Results (from the past 24 hour(s))   CBC auto differential    Collection Time: 03/30/23  8:18 PM   Result Value Ref Range    WBC 21.13 (H) 3.90 - 12.70 K/uL    RBC 5.39 4.60 - 6.20 M/uL    Hemoglobin 17.4 14.0 - 18.0 g/dL    Hematocrit 49.7 40.0 - 54.0 %    MCV 92 82 - 98 fL    MCH 32.3 (H) 27.0 - 31.0 pg    MCHC 35.0 32.0 - 36.0 g/dL    RDW 13.2 11.5 - 14.5 %    Platelets 338 150 - 450 K/uL    MPV 9.8 9.2 - 12.9 fL    Immature Granulocytes 0.8 (H) 0.0 - 0.5 %    Gran # (ANC) 17.7 (H) 1.8 - 7.7 K/uL    Immature Grans (Abs) 0.17 (H) 0.00 - 0.04 K/uL    Lymph # 1.7 1.0 - 4.8 K/uL    Mono # 1.4 (H) 0.3 - 1.0 K/uL    Eos # 0.1 0.0 - 0.5 K/uL    Baso # 0.14 0.00 - 0.20 K/uL    nRBC 0 0 /100 WBC    Gran % 83.6 (H) 38.0 - 73.0 %    Lymph % 8.2 (L) 18.0 - 48.0 %    Mono % 6.5 4.0 - 15.0 %    Eosinophil % 0.2 0.0 - 8.0 %    Basophil % 0.7 0.0 - 1.9 %    Differential Method Automated    Comprehensive metabolic panel    Collection Time: 03/30/23  8:18 PM   Result Value Ref Range    Sodium 142 136 - 145 mmol/L    Potassium 4.0 3.5 - 5.1 mmol/L    Chloride 103 95 - 110 mmol/L    CO2 25 23 - 29 mmol/L    Glucose 121 (H) 70 - 110 mg/dL    BUN 16 8 - 23 mg/dL    Creatinine 0.9 0.5 - 1.4 mg/dL    Calcium 10.2 8.7 - 10.5 mg/dL    Total Protein 9.3 (H) 6.0 - 8.4 g/dL    Albumin 4.2 3.5 - 5.2 g/dL    Total Bilirubin 0.5 0.1 - 1.0 mg/dL     Alkaline Phosphatase 121 55 - 135 U/L    AST 25 10 - 40 U/L    ALT 31 10 - 44 U/L    Anion Gap 14 8 - 16 mmol/L    eGFR >60 >60 mL/min/1.73 m^2       RADIOLOGY  CT Shoulder Without Contrast Left    Result Date: 3/30/2023  EXAMINATION: CT SHOULDER WITHOUT CONTRAST LEFT CLINICAL HISTORY: Fracture, shoulder; TECHNIQUE: CT of the left shoulder without COMPARISON: None FINDINGS: Comminuted fracture of the left humerus.  AC joint hypertrophy.  Glenoid is grossly unremarkable.     As above All CT scans at this facility are performed  using dose modulation techniques as appropriate to performed exam including the following:  automated exposure control; adjustment of mA and/or kV according to the patients size (this includes techniques or standardized protocols for targeted exams where dose is matched to indication/reason for exam: i.e. extremities or head);  iterative reconstruction technique. Electronically signed by: Richar Arreola Date:    03/30/2023 Time:    19:30    X-Ray Shoulder Trauma Left    Result Date: 3/30/2023  EXAMINATION: XR SHOULDER TRAUMA 3 VIEW LEFT CLINICAL HISTORY: Unspecified fall, initial encounter TECHNIQUE: Two or three views of the left shoulder were performed. COMPARISON: None FINDINGS: Comminuted fracture of the left humeral head.  AC joint hypertrophy.     As above Electronically signed by: Richar Arreola Date:    03/30/2023 Time:    18:04      EKG    MICROBIOLOGY    MDM

## 2023-03-31 NOTE — PLAN OF CARE
[facility]  Discharge Final Note    Primary Care Provider: Primary Doctor No    Expected Discharge Date: 3/31/2023    Final Discharge Note (most recent)       Final Note - 03/31/23 1128          Final Note    Assessment Type Final Discharge Note     Anticipated Discharge Disposition Home-Health Care Svc        Post-Acute Status    Discharge Delays None known at this time                     Important Message from Medicare             Contact Info       Lola De Luna MD   Specialty: Interventional Cardiology, Cardiology, Cardiovascular Disease    [facility]  [address]  [address]  [facility] [address]   Phone: [phone]       Next Steps: Schedule an appointment as soon as possible for a visit in 2 week(s)    Instructions: Established patient, hospital discharge follow up    RAJESH PARR DO   Specialty: Orthopedic Surgery    [address] [place] [address] [facility]  [facility] [address]   Phone: [phone]       Next Steps: Schedule an appointment as soon as possible for a visit in 2 week(s)          Home health arranged with lidia Keane ordered with [facility].

## 2023-03-31 NOTE — ASSESSMENT & PLAN NOTE
Body mass index is 36.76 kg/m². Elevation likely secondary to increased calorie intake and sedentary lifestyle. Patient educated on morbidity and mortality in regards to elevated BMI and stressed importance of diet and exercise.   Plan:  -low fat/low calorie diet

## 2023-03-31 NOTE — PT/OT/SLP EVAL
"Physical Therapy Evaluation    Patient Name:  Rodolfo Alvarez   MRN:  [id number]    Recommendations:     Discharge Recommendations: home health PT   Discharge Equipment Recommendations: bedside commode, wheelchair (MD recommeded platform ELIDIA)   Barriers to discharge: Inaccessible home    Assessment:     Rodolfo Alvarez is a 72 y.o. male admitted with a medical diagnosis of Traumatic closed displaced fracture of upper end of humerus, left, initial encounter.  He presents with the following impairments/functional limitations: weakness, impaired endurance, impaired functional mobility, gait instability, impaired balance, decreased coordination, decreased safety awareness, pain, decreased lower extremity function which limits safe functional mobility, increases risk of falls, and caregiver burden. Pt with baseline gait deficits which likely led to fall. Pt with limited ability to tolerate/manage increased gait distances with any AD due to safety concerns. Pt demonstrates mod-severe festinating gait which may require further medical assessment to determine etiology. Recommend w/c, BSC and that pt discharge into the care of family (son).    Rehab Prognosis: Good and Fair; patient would benefit from acute skilled PT services to address these deficits and reach maximum level of function.    Recent Surgery: * No surgery found *      Plan:     During this hospitalization, patient to be seen 3 x/week to address the identified rehab impairments via gait training, therapeutic activities, therapeutic exercises, neuromuscular re-education and progress toward the following goals:    Plan of Care Expires:  04/14/23    Subjective     Chief Complaint: closed displaced proximal humerus fracture (L)  Patient/Family Comments/goals: to get better "I think that's what happened" pt with LOB due to decreased ability to manage QC during session. Pt believes similar scenario resulted in fall.   Pain/Comfort:  Pain Rating 1: 4/10  Pain Addressed 1: " "Reposition, Distraction, Nurse notified    Patients cultural, spiritual, Methodist conflicts given the current situation: no    Living Environment:  Pt lives alone in 2nd floor condo. Family reports his home is narrow with furniture that pt reaches for. Pt's son lives in [facility] with no steps/stairs at entry, more spacious.   Prior to admission, patients level of function was mod I with QC. Independent with ADLs. At baseline pt sleeps in lift chair. Pt reported difficulty managing stairs "especially going down"  Equipment used at home: cane, quad.  DME owned (not currently used): none.  Upon discharge, patient will have assistance from son, spouse. Son reported that they would be able to have pt's lift chair moved to his home but he will be able to use his recliner for the time being.     Objective:     Communicated with nursing (Víctor) and performed chart review via epic prior to session.  Patient found supine with peripheral IV  upon PT entry to room. Wife and son at bedside    General Precautions: Standard, fall  Orthopedic Precautions: (LUMAICO in sling, MD Ambrosio cleared pt to weight bear through forearm on platform)   Braces: N/A  Respiratory Status: Room air    Exams:  Cognitive Exam:  Patient is oriented to Person, Place, Time, and Situation  Gross Motor Coordination:  impaired  Postural Exam:  Patient presented with the following abnormalities:    -       Rounded shoulders  -       Forward head  RLE ROM: WFL limited TKE  RLE Strength: WFL  LLE ROM: WFL limited TKE  LLE Strength: WFL  Overall deconditioned, reported issues with B knees "locking up" if resting with them extended in bed.    Functional Mobility:  Bed Mobility:     Scooting: moderate assistance  Supine to Sit: moderate assistance  Sit to Supine: moderate assistance  Tolerated sitting EOB 10-15 mins with no LOB, dizziness  Transfers:     Sit to Stand:  minimum assistance with hand-held assist  Bed to Chair: minimum assistance with  hand-held assist "  using  Stand Pivot  Gait: Ambulated 15ft x 2 min A HHA with unsteadiness on feet, cues for safety then 45ft x 2 min A with QC, several episodes of festinating gait, Cued to raise head and pt was able to improve overall balance, but minimally able to maintain beyond 30secs. Wide ALVARADO, flexed posture, unsteadiness on feet, after seated rest break. Attempted gait training with L PRW but unable to tolerate. Platform positioned almost perpendicular to RW  to accommodate for decreased/limited shoulder mobility  Balance: poor plus dynamic standing balance      AM-PAC 6 CLICK MOBILITY  Total Score:13       Treatment & Education:  Educated pt and family on following:  Appropriate foot wear, no crocs, backless shoes, flip flops etc  Appropriate sequence of donning/doffing shirt (consulted with OT)  Removal of trip hazards ie area rugs  Proper guarding/stabilization/handling techniques  Acceptable ROM (elbow/wrist/hand only per MD orders)  Positioning to support/stabilize LUE in sitting and supine with pillows  Donning/doffing sling and proper way to adjust straps  Monitoring of neck strap to maintain circulation in neck  Advised against use of QC at this time  Benefits of acute PT as well as HHPT/OT services  Modifications to recliner to improve ability to rise from chair and educated on proper hand placement on arm rest to come to standing  Coordinated with MD (HMG and ORTHO)/SWER/DME provider for appropriate equipment      Patient left supine with all lines intact, call button in reach, nursing notified, and family present.    GOALS:   Multidisciplinary Problems       Physical Therapy Goals          Problem: Physical Therapy    Goal Priority Disciplines Outcome Goal Variances Interventions   Physical Therapy Goal     PT, PT/OT      Description: Pt will perform bed mobility SPV in order to participate in EOB activity.  Pt will perform transfers SPV in order to participate in OOB activity.   Pt will ambulate 100ft SPV  with LRAD in order to participate in daily tasks.                         History:     Past Medical History:   Diagnosis Date    HLD (hyperlipidemia)     Hypertension        History reviewed. No pertinent surgical history.    Time Tracking:     PT Received On:    PT Start Time: 1400     PT Stop Time: 1540  PT Total Time (min): 100 min     Billable Minutes: Evaluation 15, Gait Training 45, and Therapeutic Activity 40      03/31/2023

## 2023-03-31 NOTE — PLAN OF CARE
[facility] - Med Surg  Initial Discharge Assessment       Primary Care Provider: Primary Doctor No    Admission Diagnosis: Fall [W19.XXXA]  Closed nondisplaced comminuted fracture of shaft of left humerus, initial encounter [S42.355A]    Admission Date: 3/30/2023  Expected Discharge Date: 3/31/2023    Discharge Barriers Identified: None    Payor: HU[employer] MANAGED MEDICARE / Plan: HU[employer] MEDICARE HMO / Product Type: Capitation /     Extended Emergency Contact Information  Primary Emergency Contact: merced keane  Home Phone: [phone]  Work Phone: [phone]  Mobile Phone: [phone]  Relation: Son  Preferred language: English   needed? No    Discharge Plan A: Home Health  Discharge Plan B: Home    No Pharmacies Listed    Initial Assessment (most recent)       Adult Discharge Assessment - 03/31/23 1058          Discharge Assessment    Assessment Type Discharge Planning Assessment     Confirmed/corrected address, phone number and insurance Yes     Source of Information patient;family     Communicated CHIOMA with patient/caregiver Date not available/Unable to determine     People in Home alone     Do you have help at home or someone to help you manage your care at home? No     Prior to hospitilization cognitive status: Alert/Oriented     Current cognitive status: Alert/Oriented     Walking or Climbing Stairs --   independent    Dressing/Bathing --   independent    Home Accessibility not wheelchair accessible     Equipment Currently Used at Home CPAP     Patient currently being followed by outpatient case management? No     Do you currently have service(s) that help you manage your care at home? No     Do you take prescription medications? Yes     Do you have prescription coverage? Yes     Do you have any problems affording any of your prescribed medications? No     Is the patient taking medications as prescribed? yes     How do you get to doctors appointments? family or friend will provide     Are you on  dialysis? No     Do you take coumadin? No     Discharge Plan A Home Health     Discharge Plan B Home     DME Needed Upon Discharge  none     Discharge Plan discussed with: Patient;Adult children     Discharge Barriers Identified None                      Lives alone in second story apartment. Home health referral sent.  Will need   platform walker, order submitted.

## 2023-04-01 NOTE — ASSESSMENT & PLAN NOTE
Patient presented following ground level fall and acute left shoulder pain was found to have sustained an acute traumatic comminuted fracture involving his left femur head noted on imaging.  Orthopedics consulted with recommendations to obtain further imaging via CT. Patient remains hemodynamically stable and neurovascularly intact.  Patient placed in sling and swath and awaiting further surgical recommendations pending morning rounds by Orthopedics and CT of left upper extremity.  Plan:  -NPO  -Continue current pain regimen, titrate as needed  -Bowel regimen  -Bedrest  -Wound care   -None weightbearing  -Continue NGT decompression  -Continue sling  -IVFs prn  -Antiemetics prn  -Tylenol as needed for fever   -f/u CT and orthopedics  -PT/OT    3/31  Non-operative management recommended by Orthopedics  F/U with Orthopedics in 1-2 weeks for further management

## 2023-04-01 NOTE — ASSESSMENT & PLAN NOTE
Body mass index is 36.62 kg/m². Elevation likely secondary to increased calorie intake and sedentary lifestyle. Patient educated on morbidity and mortality in regards to elevated BMI and stressed importance of diet and exercise.   Plan:  -low fat/low calorie diet

## 2023-04-01 NOTE — HOSPITAL COURSE
3/31/23  Orthopedics has evaluated and recommendations for non-operative management  PT/OT reccs for HH with DME, ordered. Case management/social work to arrange prior to discharge home today  Patient noted to have systolic murmur on exam today, was recently seen by his Cardiologist at [facility] and reports having TTE done  Advised patient to f/u with his established Cardiologist and with Orthopedics for further management

## 2023-04-01 NOTE — DISCHARGE SUMMARY
[facility] Medicine  Discharge Summary      Patient Name: Rodolfo Alvarez  MRN: [id number]  [facility]: [id number]  Patient Class: IP- Inpatient  Admission Date: 3/30/2023  Hospital Length of Stay: 1 days  Discharge Date and Time: 3/31/2023  6:00 PM  Attending Physician: Shireen att. providers found   Discharging Provider: Brennan Serra MD  Primary Care Provider: Primary Doctor Shireen    Primary Care Team: Networked reference to record PCT     HPI:   Rodolfo Alvarez is a 72 y.o. male with a PMH  has a past medical history of HLD (hyperlipidemia) and Hypertension. who presented to the ED with left shoulder pain following ground level fall earlier today.  Patient stated he was walking out of [employer] and fell onto his left shoulder after tripping and falling.  Patient denied endorsing any head trauma, loss of consciousness, and reported also endorsing abrasions on his upper extremities.  Patient reported endorsing immediate pain throughout his left shoulder described as hurting/throbbing in nature, constant, rated 10/10 in severity, radiating down his arm with no associated numbness, tingling, or discoloration but did have decreased range of motion secondary to pain.  All other review of systems negative except as noted above.  Prior to the incident, patient reported being in his usual state health no other concerns or complaints.  With orthopedics was consulted after patient was found to have sustained an acute traumatic left humeral head fracture noted on imaging and recommended admission to hospital medicine with further recommendations pending CT upper extremity.      PCP: Primary Doctor No        * No surgery found *      Hospital Course:   3/31/23  Orthopedics has evaluated and recommendations for non-operative management  PT/OT reccs for HH with DME, ordered. Case management/social work to arrange prior to discharge home today  Patient noted to have systolic murmur on exam today, was recently seen  by his Cardiologist at [facility] and reports having TTE done  Advised patient to f/u with his established Cardiologist and with Orthopedics for further management       Goals of Care Treatment Preferences:  Code Status: Full Code      Consults:   Consults (From admission, onward)        Status Ordering Provider     Inpatient consult to Social Work  Once        Provider:  (Not yet assigned)    Completed SHANTHI FAULKNER     Inpatient consult to Orthopedic Surgery  Once        Provider:  Emelyn Ambrosio DO    Completed SHANTHI FAULKNER     IP consult to case management  Once        Provider:  (Not yet assigned)    Completed LSIM SAUCEDA          Cardiac/Vascular  HLD (hyperlipidemia)  Patient is chronically on statin.will continue for now. Last Lipid Panel: No results found for: CHOL, HDL, LDLCALC, TRIG, CHOLHDL  Plan:  -Continue home medication  -low fat/low calorie diet    Primary hypertension  Currently normotensive.   Plan:  -Optimize pain control   -Continue home medications, titrate as needed   -Monitor BP  -Low salt/cardiac diet when not NPO  -IV hydralazine prn for SBP>160 or DBP>90           Endocrine  Class 2 severe obesity due to excess calories with serious comorbidity and body mass index (BMI) of 36.0 to 36.9 in adult  Body mass index is 36.62 kg/m². Elevation likely secondary to increased calorie intake and sedentary lifestyle. Patient educated on morbidity and mortality in regards to elevated BMI and stressed importance of diet and exercise.   Plan:  -low fat/low calorie diet       Other  * Fracture of humeral head, closed, left, initial encounter  Patient presented following ground level fall and acute left shoulder pain was found to have sustained an acute traumatic comminuted fracture involving his left femur head noted on imaging.  Orthopedics consulted with recommendations to obtain further imaging via CT. Patient remains hemodynamically stable and neurovascularly intact.  Patient placed in sling  and swath and awaiting further surgical recommendations pending morning rounds by Orthopedics and CT of left upper extremity.  Plan:  -NPO  -Continue current pain regimen, titrate as needed  -Bowel regimen  -Bedrest  -Wound care   -None weightbearing  -Continue NGT decompression  -Continue sling  -IVFs prn  -Antiemetics prn  -Tylenol as needed for fever   -f/u CT and orthopedics  -PT/OT    3/31  Non-operative management recommended by Orthopedics  F/U with Orthopedics in 1-2 weeks for further management        Final Active Diagnoses:    Diagnosis Date Noted POA    PRINCIPAL PROBLEM:  Fracture of humeral head, closed, left, initial encounter [S42.292A] 03/31/2023 Yes    Primary hypertension [I10] 03/31/2023 Yes    HLD (hyperlipidemia) [E78.5] 03/31/2023 Yes    Class 2 severe obesity due to excess calories with serious comorbidity and body mass index (BMI) of 36.0 to 36.9 in adult [E66.01, Z68.36] 03/31/2023 Not Applicable    Traumatic closed displaced fracture of upper end of humerus, left, initial encounter [S42.202A] 03/31/2023 Yes    Sleep apnea in adult [G47.30] 03/31/2023 Yes      Problems Resolved During this Admission:       Discharged Condition: stable    [place] [facility]    Follow Up:   Follow-up Information     Lola De Luna MD. Schedule an appointment as soon as possible for a visit in 2 week(s).    Specialties: Interventional Cardiology, Cardiology, Cardiovascular Disease  Why: Established patient, hospital discharge follow up  Contact information:  [address]  [address]  [facility] [id number] [phone]             RAJESH PARR DO. Schedule an appointment as soon as possible for a visit in 2 week(s).    Specialty: Orthopedic Surgery  Contact information:  [address] [facility]  [facility] [address] [phone]             [facility] Follow up.    Specialty: Home Health  "Services  Why: Home Health will see you Sunday  Contact information:  [address] [facility]  [place] [address] [phone]                       Patient Instructions:      WHEELCHAIR FOR HOME USE     Order Specific Question Answer Comments   Hours in W/C per day: 8    Type of Wheelchair: Standard    Size(Width): 18"(STD adult)    Leg Support: STD footrests    Lap Belt: Velcro    Accessories: Anti-tippers    Cushion: Basic    Reclining Back No    Height: 5' 6" (1.676 m)    Weight: 102.9 kg (226 lb 13.7 oz)    Does patient have medical equipment at home? CPAP    Length of need (1-99 months): 99    Please check all that apply: Patient mobility limitations cannot be sufficiently resolved by the use of other ambulatory therapies.    Please check all that apply: Caregiver is capable and willing to operate wheelchair safely.    Please check all that apply: The patient requires the use of a w/c for activities of daily living within the Home.      3 IN 1 COMMODE FOR HOME USE     Order Specific Question Answer Comments   Type: Standard    Height: 5' 6" (1.676 m)    Weight: 102.9 kg (226 lb 13.7 oz)    Does patient have medical equipment at home? CPAP    Length of need (1-99 months): 99      Other restrictions (specify):   Order Comments: Sling for comfort, may open to do ROM of elbow/wrist as pain allows.  Patient encouraged to move hand/wrist as much as possible to help with swelling/stiffness  Ice to left shoulder PRN pain       Significant Diagnostic Studies: Labs:   CMP   Recent Labs   Lab 03/30/23  2018 03/31/23  0604    143   K 4.0 3.9    107   CO2 25 23   * 112*   BUN 16 15   CREATININE 0.9 0.9   CALCIUM 10.2 9.2   PROT 9.3* 7.9   ALBUMIN 4.2 3.6   BILITOT 0.5 0.6   ALKPHOS 121 94   AST 25 19   ALT 31 25   ANIONGAP 14 13    and CBC   Recent Labs   Lab 03/30/23  2018 03/31/23  0604   WBC 21.13* 14.30*   HGB 17.4 16.7   HCT 49.7 48.9    285       Pending Diagnostic Studies:     None "         Medications:  Reconciled Home Medications:      Medication List      START taking these medications    HYDROcodone-acetaminophen 5-325 mg per tablet  Commonly known as: NORCO  Take 1 tablet by mouth every 8 (eight) hours as needed for Pain.        CONTINUE taking these medications    amLODIPine 10 MG tablet  Commonly known as: NORVASC  Take 10 mg by mouth.     * atorvastatin 20 MG tablet  Commonly known as: LIPITOR  Take 1 tablet by mouth once daily.     * atorvastatin 20 MG tablet  Commonly known as: LIPITOR  Take 20 mg by mouth.     lisinopriL 40 MG tablet  Commonly known as: PRINIVIL,ZESTRIL  Take 40 mg by mouth.     traZODone 50 MG tablet  Commonly known as: DESYREL  Take 50 mg by mouth every evening.         * This list has 2 medication(s) that are the same as other medications prescribed for you. Read the directions carefully, and ask your doctor or other care provider to review them with you.                Indwelling Lines/Drains at time of discharge:   Lines/Drains/Airways     None                 Time spent on the discharge of patient: > 30 minutes         Brennan Serra MD  Department of Hospital Medicine  [facility] - Med Surg

## 2023-04-03 ENCOUNTER — TELEPHONE (OUTPATIENT)
Dept: ORTHOPEDICS | Facility: CLINIC | Age: 73
End: 2023-04-03
Payer: MEDICARE

## 2023-04-03 DIAGNOSIS — M25.512 LEFT SHOULDER PAIN, UNSPECIFIED CHRONICITY: Primary | ICD-10-CM

## 2023-04-03 NOTE — TELEPHONE ENCOUNTER
Spoke with patient's son Nirmal and scheduled his ER follow up appointment. Understanding verbalized of appointment date, time and location.

## 2023-04-03 NOTE — TELEPHONE ENCOUNTER
----- Message from Nirmal Muro PA-C sent at 4/3/2023  7:56 AM CDT -----  This patient needs to come to [facility] on 4/17 with new xrays of shoulder

## 2023-04-17 ENCOUNTER — TELEPHONE (OUTPATIENT)
Dept: ORTHOPEDICS | Facility: CLINIC | Age: 73
End: 2023-04-17

## 2023-04-17 ENCOUNTER — HOSPITAL ENCOUNTER (OUTPATIENT)
Dept: RADIOLOGY | Facility: HOSPITAL | Age: 73
Discharge: HOME | End: 2023-04-17
Attending: PHYSICIAN ASSISTANT
Payer: MEDICARE

## 2023-04-17 ENCOUNTER — OFFICE VISIT (OUTPATIENT)
Dept: ORTHOPEDICS | Facility: CLINIC | Age: 73
End: 2023-04-17
Payer: MEDICARE

## 2023-04-17 VITALS
HEIGHT: 66 IN | DIASTOLIC BLOOD PRESSURE: 73 MMHG | SYSTOLIC BLOOD PRESSURE: 136 MMHG | HEART RATE: 64 BPM | WEIGHT: 226.88 LBS | BODY MASS INDEX: 36.46 KG/M2

## 2023-04-17 DIAGNOSIS — S42.202D CLOSED TRAUMATIC DISPLACED FRACTURE OF PROXIMAL END OF LEFT HUMERUS WITH ROUTINE HEALING, SUBSEQUENT ENCOUNTER: Primary | ICD-10-CM

## 2023-04-17 DIAGNOSIS — M25.512 LEFT SHOULDER PAIN, UNSPECIFIED CHRONICITY: ICD-10-CM

## 2023-04-17 DIAGNOSIS — M25.512 LEFT SHOULDER PAIN, UNSPECIFIED CHRONICITY: Primary | ICD-10-CM

## 2023-04-17 DIAGNOSIS — M89.8X2 PAIN OF LEFT HUMERUS: ICD-10-CM

## 2023-04-17 PROCEDURE — 1100F PTFALLS ASSESS-DOCD GE2>/YR: CPT | Mod: CPTII,S$GLB,, | Performed by: PHYSICIAN ASSISTANT

## 2023-04-17 PROCEDURE — 73030 XR SHOULDER COMPLETE 2 OR MORE VIEWS LEFT: ICD-10-PCS | Mod: 26,LT,, | Performed by: RADIOLOGY

## 2023-04-17 PROCEDURE — 99999 PR PBB SHADOW E&M-EST. PATIENT-LVL IV: CPT | Mod: PBBFAC,,, | Performed by: PHYSICIAN ASSISTANT

## 2023-04-17 PROCEDURE — 1160F PR REVIEW ALL MEDS BY PRESCRIBER/CLIN PHARMACIST DOCUMENTED: ICD-10-PCS | Mod: CPTII,S$GLB,, | Performed by: PHYSICIAN ASSISTANT

## 2023-04-17 PROCEDURE — 1159F MED LIST DOCD IN RCRD: CPT | Mod: CPTII,S$GLB,, | Performed by: PHYSICIAN ASSISTANT

## 2023-04-17 PROCEDURE — 4010F PR ACE/ARB THEARPY RXD/TAKEN: ICD-10-PCS | Mod: CPTII,S$GLB,, | Performed by: PHYSICIAN ASSISTANT

## 2023-04-17 PROCEDURE — 1111F PR DISCHARGE MEDS RECONCILED W/ CURRENT OUTPATIENT MED LIST: ICD-10-PCS | Mod: CPTII,S$GLB,, | Performed by: PHYSICIAN ASSISTANT

## 2023-04-17 PROCEDURE — 99215 PR OFFICE/OUTPT VISIT, EST, LEVL V, 40-54 MIN: ICD-10-PCS | Mod: S$GLB,,, | Performed by: PHYSICIAN ASSISTANT

## 2023-04-17 PROCEDURE — 3288F PR FALLS RISK ASSESSMENT DOCUMENTED: ICD-10-PCS | Mod: CPTII,S$GLB,, | Performed by: PHYSICIAN ASSISTANT

## 2023-04-17 PROCEDURE — 3078F DIAST BP <80 MM HG: CPT | Mod: CPTII,S$GLB,, | Performed by: PHYSICIAN ASSISTANT

## 2023-04-17 PROCEDURE — 1111F DSCHRG MED/CURRENT MED MERGE: CPT | Mod: CPTII,S$GLB,, | Performed by: PHYSICIAN ASSISTANT

## 2023-04-17 PROCEDURE — 73030 X-RAY EXAM OF SHOULDER: CPT | Mod: TC,LT

## 2023-04-17 PROCEDURE — 3008F BODY MASS INDEX DOCD: CPT | Mod: CPTII,S$GLB,, | Performed by: PHYSICIAN ASSISTANT

## 2023-04-17 PROCEDURE — 1125F PR PAIN SEVERITY QUANTIFIED, PAIN PRESENT: ICD-10-PCS | Mod: CPTII,S$GLB,, | Performed by: PHYSICIAN ASSISTANT

## 2023-04-17 PROCEDURE — 73030 X-RAY EXAM OF SHOULDER: CPT | Mod: 26,LT,, | Performed by: RADIOLOGY

## 2023-04-17 PROCEDURE — 3078F PR MOST RECENT DIASTOLIC BLOOD PRESSURE < 80 MM HG: ICD-10-PCS | Mod: CPTII,S$GLB,, | Performed by: PHYSICIAN ASSISTANT

## 2023-04-17 PROCEDURE — 1160F RVW MEDS BY RX/DR IN RCRD: CPT | Mod: CPTII,S$GLB,, | Performed by: PHYSICIAN ASSISTANT

## 2023-04-17 PROCEDURE — 1100F PR PT FALLS ASSESS DOC 2+ FALLS/FALL W/INJURY/YR: ICD-10-PCS | Mod: CPTII,S$GLB,, | Performed by: PHYSICIAN ASSISTANT

## 2023-04-17 PROCEDURE — 4010F ACE/ARB THERAPY RXD/TAKEN: CPT | Mod: CPTII,S$GLB,, | Performed by: PHYSICIAN ASSISTANT

## 2023-04-17 PROCEDURE — 1125F AMNT PAIN NOTED PAIN PRSNT: CPT | Mod: CPTII,S$GLB,, | Performed by: PHYSICIAN ASSISTANT

## 2023-04-17 PROCEDURE — 99999 PR PBB SHADOW E&M-EST. PATIENT-LVL IV: ICD-10-PCS | Mod: PBBFAC,,, | Performed by: PHYSICIAN ASSISTANT

## 2023-04-17 PROCEDURE — 3288F FALL RISK ASSESSMENT DOCD: CPT | Mod: CPTII,S$GLB,, | Performed by: PHYSICIAN ASSISTANT

## 2023-04-17 PROCEDURE — 3075F PR MOST RECENT SYSTOLIC BLOOD PRESS GE 130-139MM HG: ICD-10-PCS | Mod: CPTII,S$GLB,, | Performed by: PHYSICIAN ASSISTANT

## 2023-04-17 PROCEDURE — 99215 OFFICE O/P EST HI 40 MIN: CPT | Mod: S$GLB,,, | Performed by: PHYSICIAN ASSISTANT

## 2023-04-17 PROCEDURE — 3075F SYST BP GE 130 - 139MM HG: CPT | Mod: CPTII,S$GLB,, | Performed by: PHYSICIAN ASSISTANT

## 2023-04-17 PROCEDURE — 3008F PR BODY MASS INDEX (BMI) DOCUMENTED: ICD-10-PCS | Mod: CPTII,S$GLB,, | Performed by: PHYSICIAN ASSISTANT

## 2023-04-17 PROCEDURE — 1159F PR MEDICATION LIST DOCUMENTED IN MEDICAL RECORD: ICD-10-PCS | Mod: CPTII,S$GLB,, | Performed by: PHYSICIAN ASSISTANT

## 2023-04-17 RX ORDER — DICLOFENAC SODIUM 10 MG/G
2 GEL TOPICAL DAILY PRN
COMMUNITY
End: 2023-06-08 | Stop reason: SDUPTHER

## 2023-04-17 RX ORDER — ACETAMINOPHEN 325 MG/1
325 TABLET ORAL EVERY 6 HOURS PRN
COMMUNITY

## 2023-04-17 RX ORDER — IBUPROFEN 200 MG
200 TABLET ORAL EVERY 6 HOURS PRN
COMMUNITY

## 2023-04-17 RX ORDER — ASPIRIN 81 MG/1
81 TABLET ORAL DAILY
COMMUNITY

## 2023-04-17 RX ORDER — MELATONIN 5 MG
CAPSULE ORAL NIGHTLY PRN
COMMUNITY

## 2023-04-17 NOTE — TELEPHONE ENCOUNTER
----- Message from Sultana Berrios sent at 4/17/2023  3:49 PM CDT -----  Type: Patient Call Back    Who called:pt's son Nirmal [phone]    What is the request in detail:the PT and OT would like to know how much weight can the pt put on left shoulder. Was not discussed in today's visit. Call son    Can the clinic reply by [facility]?    Would the patient rather a call back or a response via [facility]? call    Best call back number:.[phone] (home)     Additional Information:

## 2023-04-17 NOTE — PROGRESS NOTES
Subjective:      Patient ID: Rodolfo Alvarez is a 72 y.o. male.    Chief Complaint: Pain of the Left Upper Arm      HPI: Rodolfo Alvarez is a 72-year-old left-hand dominant male in clinic today for follow-up of left proximal humerus fracture.  Patient suffered a fall 2 and half weeks ago.  He was seen while admitted to the hospital.  Patient has been treated non operatively at this time with sling and therapy.  He reports that he is noticing quite a bit of improvement as far as his pain is concerned.  He is working with therapy to increase his ROM.  He denies numbness or tingling in the extremity.  No new complaints at this time.    Past Medical History:   Diagnosis Date    HLD (hyperlipidemia)     Hypertension        Current Outpatient Medications:     acetaminophen (TYLENOL) 325 MG tablet, Take 325 mg by mouth every 6 (six) hours as needed for Pain., Disp: , Rfl:     amLODIPine (NORVASC) 10 MG tablet, Take 10 mg by mouth., Disp: , Rfl:     ascorbic acid (VITAMIN C ORAL), Take by mouth once daily., Disp: , Rfl:     aspirin (ECOTRIN) 81 MG EC tablet, Take 81 mg by mouth once daily., Disp: , Rfl:     atorvastatin (LIPITOR) 20 MG tablet, Take 20 mg by mouth., Disp: , Rfl:     CAFFEINE ORAL, Take by mouth daily as needed., Disp: , Rfl:     diclofenac sodium (VOLTAREN) 1 % Gel, Apply 2 g topically daily as needed., Disp: , Rfl:     docosahexaenoic acid/epa (FISH OIL ORAL), Take by mouth once daily., Disp: , Rfl:     docusate sodium (STOOL SOFTENER ORAL), Take by mouth daily as needed., Disp: , Rfl:     ibuprofen (ADVIL,MOTRIN) 200 MG tablet, Take 200 mg by mouth every 6 (six) hours as needed for Pain., Disp: , Rfl:     lisinopriL (PRINIVIL,ZESTRIL) 40 MG tablet, Take 40 mg by mouth., Disp: , Rfl:     LORATADINE ORAL, Take by mouth daily as needed., Disp: , Rfl:     melatonin 5 mg Cap, Take by mouth nightly as needed., Disp: , Rfl:     multivit-min/folic/vit K/lycop (MEN'S 50 PLUS MULTIVITAMIN ORAL), Take by mouth once  "daily., Disp: , Rfl:     mv-mn/iron/folic acid/herb 190 (VITAMIN D3 COMPLETE ORAL), Take by mouth once daily., Disp: , Rfl:     psyllium seed, with sugar, (FIBER ORAL), Take by mouth daily as needed., Disp: , Rfl:     traZODone (DESYREL) 50 MG tablet, Take 50 mg by mouth every evening., Disp: , Rfl:     atorvastatin (LIPITOR) 20 MG tablet, Take 1 tablet by mouth once daily., Disp: , Rfl:     HYDROcodone-acetaminophen (NORCO) 5-325 mg per tablet, Take 1 tablet by mouth every 8 (eight) hours as needed for Pain. (Patient not taking: Reported on 4/17/2023), Disp: 15 tablet, Rfl: 0  Review of patient's allergies indicates:  No Known Allergies    /73 (BP Location: Right arm, Patient Position: Sitting, BP Method: Medium (Automatic))   Pulse 64   Ht 5' 6" (1.676 m)   Wt 102.9 kg (226 lb 13.7 oz)   BMI 36.62 kg/m²     ROS      Objective:    Ortho Exam   Left shoulder:   Skin is intact   Moderate TTP  Moderate edema of the shoulder extending distally  Ecchymosis has migrated into the mid upper arm   Shoulder ROM is greatly decreased secondary to pain   Elbow ROM full  Compartments are soft and compressible   Motor exam normal   Sensation and pulses intact  Cap refill brisk     GEN: Well developed, well nourished male. AAOX3. No acute distress.   Head: Normocephalic, atraumatic.   Eyes: SADI  Neck: Trachea is midline, no adenopathy  Resp: Breathing unlabored.  Neuro: Motor function normal, Cranial nerves intact  Psych: Mood and affect appropriate.       Assessment:     Imaging:  X-ray right shoulder obtained today shows previously noted left humeral neck fracture in similar alignment with interval callus formation.  There are no detrimental changes.        1. Closed traumatic displaced fracture of proximal end of left humerus with routine healing, subsequent encounter          Plan:       Reviewed the radiographs with the patient and his son.  We discussed operative versus non operative management.  Patient " understands that operative management would include shoulder replacement surgery.  This is major surgery and patient is not interested in that at this time.  He understands the he will likely have decreased function of the left upper extremity without surgery, but the patient is right-hand-dominant and only uses the left hand for writing.  Due to all of this, the patient would like to proceed with non operative management at this time.  He will continue to use the sling as needed, but he may wean out of it part-time.  Patient should continue to work with therapy on ROM exercises.  We will see him back in about 1 month for repeat radiographs and further evaluation.  At that time we will likely let the patient begin with more resistance type exercises in therapy.       Follow up in about 1 month (around 5/17/2023).          Patient note was created using MModal Dictation.  Any errors in syntax or even information may not have been identified and edited on initial review prior to signing this note.

## 2023-04-17 NOTE — TELEPHONE ENCOUNTER
Spoke with patient's son Nirmal and informed him that I would speak with Nirmal Muro PA-C for advisement and call him back. Understanding verbalized.

## 2023-04-18 ENCOUNTER — TELEPHONE (OUTPATIENT)
Dept: ORTHOPEDICS | Facility: CLINIC | Age: 73
End: 2023-04-18
Payer: MEDICARE

## 2023-04-18 NOTE — TELEPHONE ENCOUNTER
----- Message from Elkin Almonte sent at 4/18/2023 12:16 PM CDT -----  Contact: Tammy  .Type:  Needs Medical Advice    Who Called:  [facility] Tammy Occupational therapist pattt  Would the patient rather a call back or a response via [facility]?  Call back  Best Call Back Number: [phone]  Additional Information:  Home Health  occupational therapist asst. Munoz is calling for an order for range of motion for the left shoulder.  Fax# [phone]

## 2023-04-18 NOTE — TELEPHONE ENCOUNTER
Spoke with Tammy/ [facility] and advised her of patients weight bearing status being no more than a coffee cup. She would like an order specifying this along with ROM. I informed her that I would speak with Nirmal Muro PA-C on tomorrow to obtain these orders and fax them to her. Understanding verbalized.

## 2023-04-20 ENCOUNTER — TELEPHONE (OUTPATIENT)
Dept: ORTHOPEDICS | Facility: CLINIC | Age: 73
End: 2023-04-20
Payer: MEDICARE

## 2023-04-20 NOTE — TELEPHONE ENCOUNTER
----- Message from Teri Tillman sent at 4/20/2023  8:58 AM CDT -----  Type:  Patient Returning Call    Who Called:julio c gonzales/ magdal[facility]   Does the patient know what this is regarding?:verbal orders for left shoulder for physical therapy   Would the patient rather a call back or a response via [facility]? Call   Best Call Back Number: [phone]  Additional Information:     Please fax to [phone]

## 2023-04-20 NOTE — TELEPHONE ENCOUNTER
Spoke with Tammy/ [facility] and advised her of patients weight bearing status being no more than a coffee cup. She would like an order specifying this along with ROM. I informed her that I do not have the order yet and would speak with Nirmal Muro PA-C on tomorrow to obtain these orders and fax them to her. Understanding verbalized.

## 2023-04-21 DIAGNOSIS — S42.202D CLOSED TRAUMATIC DISPLACED FRACTURE OF PROXIMAL END OF LEFT HUMERUS WITH ROUTINE HEALING, SUBSEQUENT ENCOUNTER: Primary | ICD-10-CM

## 2023-04-25 ENCOUNTER — TELEPHONE (OUTPATIENT)
Dept: ORTHOPEDICS | Facility: CLINIC | Age: 73
End: 2023-04-25
Payer: MEDICARE

## 2023-04-25 NOTE — TELEPHONE ENCOUNTER
----- Message from Robert Wild sent at 4/25/2023  9:01 AM CDT -----  Type:  Needs Medical Advice    Who Called: Janee With Home Health   Would the patient rather a call back or a response via [facility]? call  Best Call Back Number: [phone]  Additional Information: Would like a call regarding range of motions orders and also what Pt can and can not do with his left arm please call

## 2023-05-09 ENCOUNTER — EXTERNAL HOME HEALTH (OUTPATIENT)
Dept: HOME HEALTH SERVICES | Facility: HOSPITAL | Age: 73
End: 2023-05-09
Payer: MEDICARE

## 2023-05-15 ENCOUNTER — HOSPITAL ENCOUNTER (OUTPATIENT)
Dept: RADIOLOGY | Facility: HOSPITAL | Age: 73
Discharge: HOME | End: 2023-05-15
Attending: PHYSICIAN ASSISTANT
Payer: MEDICARE

## 2023-05-15 ENCOUNTER — OFFICE VISIT (OUTPATIENT)
Dept: ORTHOPEDICS | Facility: CLINIC | Age: 73
End: 2023-05-15
Payer: MEDICARE

## 2023-05-15 VITALS — HEIGHT: 66 IN | WEIGHT: 226 LBS | BODY MASS INDEX: 36.32 KG/M2

## 2023-05-15 DIAGNOSIS — S42.202D CLOSED TRAUMATIC DISPLACED FRACTURE OF PROXIMAL END OF LEFT HUMERUS WITH ROUTINE HEALING, SUBSEQUENT ENCOUNTER: Primary | ICD-10-CM

## 2023-05-15 DIAGNOSIS — M25.512 ACUTE PAIN OF LEFT SHOULDER: Primary | ICD-10-CM

## 2023-05-15 DIAGNOSIS — M25.512 LEFT SHOULDER PAIN, UNSPECIFIED CHRONICITY: ICD-10-CM

## 2023-05-15 PROCEDURE — 3008F PR BODY MASS INDEX (BMI) DOCUMENTED: ICD-10-PCS | Mod: CPTII,,, | Performed by: PHYSICIAN ASSISTANT

## 2023-05-15 PROCEDURE — 1125F AMNT PAIN NOTED PAIN PRSNT: CPT | Mod: CPTII,,, | Performed by: PHYSICIAN ASSISTANT

## 2023-05-15 PROCEDURE — 1100F PTFALLS ASSESS-DOCD GE2>/YR: CPT | Mod: CPTII,,, | Performed by: PHYSICIAN ASSISTANT

## 2023-05-15 PROCEDURE — 1125F PR PAIN SEVERITY QUANTIFIED, PAIN PRESENT: ICD-10-PCS | Mod: CPTII,,, | Performed by: PHYSICIAN ASSISTANT

## 2023-05-15 PROCEDURE — 1100F PR PT FALLS ASSESS DOC 2+ FALLS/FALL W/INJURY/YR: ICD-10-PCS | Mod: CPTII,,, | Performed by: PHYSICIAN ASSISTANT

## 2023-05-15 PROCEDURE — 99214 PR OFFICE/OUTPT VISIT, EST, LEVL IV, 30-39 MIN: ICD-10-PCS | Mod: ,,, | Performed by: PHYSICIAN ASSISTANT

## 2023-05-15 PROCEDURE — 73030 X-RAY EXAM OF SHOULDER: CPT | Mod: 26,LT,, | Performed by: RADIOLOGY

## 2023-05-15 PROCEDURE — 99214 OFFICE O/P EST MOD 30 MIN: CPT | Mod: ,,, | Performed by: PHYSICIAN ASSISTANT

## 2023-05-15 PROCEDURE — 3288F FALL RISK ASSESSMENT DOCD: CPT | Mod: CPTII,,, | Performed by: PHYSICIAN ASSISTANT

## 2023-05-15 PROCEDURE — 73030 X-RAY EXAM OF SHOULDER: CPT | Mod: TC,LT

## 2023-05-15 PROCEDURE — 4010F ACE/ARB THERAPY RXD/TAKEN: CPT | Mod: CPTII,,, | Performed by: PHYSICIAN ASSISTANT

## 2023-05-15 PROCEDURE — 99214 OFFICE O/P EST MOD 30 MIN: CPT | Performed by: PHYSICIAN ASSISTANT

## 2023-05-15 PROCEDURE — 73030 XR SHOULDER COMPLETE 2 OR MORE VIEWS LEFT: ICD-10-PCS | Mod: 26,LT,, | Performed by: RADIOLOGY

## 2023-05-15 PROCEDURE — 1160F RVW MEDS BY RX/DR IN RCRD: CPT | Mod: CPTII,,, | Performed by: PHYSICIAN ASSISTANT

## 2023-05-15 PROCEDURE — 1160F PR REVIEW ALL MEDS BY PRESCRIBER/CLIN PHARMACIST DOCUMENTED: ICD-10-PCS | Mod: CPTII,,, | Performed by: PHYSICIAN ASSISTANT

## 2023-05-15 PROCEDURE — 3008F BODY MASS INDEX DOCD: CPT | Mod: CPTII,,, | Performed by: PHYSICIAN ASSISTANT

## 2023-05-15 PROCEDURE — 99999 PR PBB SHADOW E&M-EST. PATIENT-LVL IV: ICD-10-PCS | Mod: PBBFAC,,, | Performed by: PHYSICIAN ASSISTANT

## 2023-05-15 PROCEDURE — 1159F MED LIST DOCD IN RCRD: CPT | Mod: CPTII,,, | Performed by: PHYSICIAN ASSISTANT

## 2023-05-15 PROCEDURE — 1159F PR MEDICATION LIST DOCUMENTED IN MEDICAL RECORD: ICD-10-PCS | Mod: CPTII,,, | Performed by: PHYSICIAN ASSISTANT

## 2023-05-15 PROCEDURE — 99999 PR PBB SHADOW E&M-EST. PATIENT-LVL IV: CPT | Mod: PBBFAC,,, | Performed by: PHYSICIAN ASSISTANT

## 2023-05-15 PROCEDURE — 4010F PR ACE/ARB THEARPY RXD/TAKEN: ICD-10-PCS | Mod: CPTII,,, | Performed by: PHYSICIAN ASSISTANT

## 2023-05-15 PROCEDURE — 3288F PR FALLS RISK ASSESSMENT DOCUMENTED: ICD-10-PCS | Mod: CPTII,,, | Performed by: PHYSICIAN ASSISTANT

## 2023-05-15 RX ORDER — TAMSULOSIN HYDROCHLORIDE 0.4 MG/1
CAPSULE ORAL
COMMUNITY
Start: 2023-04-24

## 2023-05-20 NOTE — PROGRESS NOTES
Subjective:      Patient ID: Rodolfo Alvarez is a 72 y.o. male.    Chief Complaint: Pain and Injury of the Left Shoulder      HPI: Rodolfo Alvarez is a 72-year-old left-hand dominant male in clinic today for follow-up of left proximal humerus fracture.  Patient sustained this injury when he suffered a fall about 6 weeks ago.  Patient was admitted to the hospital and we were consulted.  Patient and his son elected to treat this non operatively with sling and therapy.  Patient continues to improve.  He reports that the pain is well controlled.  His ROM is improving, but he continues to make progress.  He denies numbness or tingling in the extremity.    Past Medical History:   Diagnosis Date    HLD (hyperlipidemia)     Hypertension        Current Outpatient Medications:     acetaminophen (TYLENOL) 325 MG tablet, Take 325 mg by mouth every 6 (six) hours as needed for Pain., Disp: , Rfl:     amLODIPine (NORVASC) 10 MG tablet, Take 10 mg by mouth., Disp: , Rfl:     ascorbic acid (VITAMIN C ORAL), Take by mouth once daily., Disp: , Rfl:     aspirin (ECOTRIN) 81 MG EC tablet, Take 81 mg by mouth once daily., Disp: , Rfl:     atorvastatin (LIPITOR) 20 MG tablet, Take 20 mg by mouth., Disp: , Rfl:     atorvastatin (LIPITOR) 20 MG tablet, Take 1 tablet by mouth once daily., Disp: , Rfl:     CAFFEINE ORAL, Take by mouth daily as needed., Disp: , Rfl:     diclofenac sodium (VOLTAREN) 1 % Gel, Apply 2 g topically daily as needed., Disp: , Rfl:     docosahexaenoic acid/epa (FISH OIL ORAL), Take by mouth once daily., Disp: , Rfl:     docusate sodium (STOOL SOFTENER ORAL), Take by mouth daily as needed., Disp: , Rfl:     ibuprofen (ADVIL,MOTRIN) 200 MG tablet, Take 200 mg by mouth every 6 (six) hours as needed for Pain., Disp: , Rfl:     lisinopriL (PRINIVIL,ZESTRIL) 40 MG tablet, Take 40 mg by mouth., Disp: , Rfl:     LORATADINE ORAL, Take by mouth daily as needed., Disp: , Rfl:     melatonin 5 mg Cap, Take by mouth nightly as  "needed., Disp: , Rfl:     multivit-min/folic/vit K/lycop (MEN'S 50 PLUS MULTIVITAMIN ORAL), Take by mouth once daily., Disp: , Rfl:     mv-mn/iron/folic acid/herb 190 (VITAMIN D3 COMPLETE ORAL), Take by mouth once daily., Disp: , Rfl:     psyllium seed, with sugar, (FIBER ORAL), Take by mouth daily as needed., Disp: , Rfl:     tamsulosin (FLOMAX) 0.4 mg Cap, TAKE 1 CAPSULE BY MOUTH AT BEDTIME  DAYS., Disp: , Rfl:     traZODone (DESYREL) 50 MG tablet, Take 50 mg by mouth every evening., Disp: , Rfl:     HYDROcodone-acetaminophen (NORCO) 5-325 mg per tablet, Take 1 tablet by mouth every 8 (eight) hours as needed for Pain. (Patient not taking: Reported on 4/17/2023), Disp: 15 tablet, Rfl: 0  Review of patient's allergies indicates:  No Known Allergies    Ht 5' 6" (1.676 m)   Wt 102.5 kg (226 lb)   BMI 36.48 kg/m²     ROS      Objective:    Ortho Exam   Left shoulder:  Skin is intact   Mild TTP   Moderate edema of the shoulder  Ecchymosis continues to improve   Shoulder ROM is decreased secondary to stiffness  Elbow ROM full  Compartments are soft and compressible  Motor exam normal   Sensation and pulses intact  Cap refill brisk    GEN: Well developed, well nourished male. AAOX3. No acute distress.   Head: Normocephalic, atraumatic.   Eyes: SADI  Neck: Trachea is midline, no adenopathy  Resp: Breathing unlabored.  Neuro: Motor function normal, Cranial nerves intact  Psych: Mood and affect appropriate.       Assessment:     Imaging:  X-ray left shoulder obtained today shows previously noted left humeral neck fracture in similar alignment with continued interval callus formation.  No detrimental changes.        1. Closed traumatic displaced fracture of proximal end of left humerus with routine healing, subsequent encounter          Plan:       Reviewed the radiographs with the patient and his son.  Once again encouraged him on the progress he is made so far.  Recommended he continue to work with home health " therapy.  Son reported that he is in need of a new referral.  New referral was placed.  Patient may continue to work with home health therapy on ROM, but I would also like him to start some resistance at this time, no greater than 10 lb.  We will see the patient back in about 3 weeks for repeat radiographs and further evaluation.    Orders Placed This Encounter    SUBSEQUENT HOME HEALTH ORDERS     Follow up in about 3 weeks (around 6/5/2023).          Patient note was created using MModal Dictation.  Any errors in syntax or even information may not have been identified and edited on initial review prior to signing this note.

## 2023-05-29 DIAGNOSIS — M25.562 PAIN IN BOTH KNEES, UNSPECIFIED CHRONICITY: Primary | ICD-10-CM

## 2023-05-29 DIAGNOSIS — M25.561 PAIN IN BOTH KNEES, UNSPECIFIED CHRONICITY: Primary | ICD-10-CM

## 2023-06-07 ENCOUNTER — HOSPITAL ENCOUNTER (OUTPATIENT)
Dept: RADIOLOGY | Facility: HOSPITAL | Age: 73
Discharge: HOME | End: 2023-06-07
Attending: PHYSICIAN ASSISTANT
Payer: MEDICARE

## 2023-06-07 ENCOUNTER — OFFICE VISIT (OUTPATIENT)
Dept: ORTHOPEDICS | Facility: CLINIC | Age: 73
End: 2023-06-07
Payer: MEDICARE

## 2023-06-07 VITALS
BODY MASS INDEX: 36.32 KG/M2 | WEIGHT: 226 LBS | SYSTOLIC BLOOD PRESSURE: 136 MMHG | HEART RATE: 64 BPM | DIASTOLIC BLOOD PRESSURE: 72 MMHG | HEIGHT: 66 IN | TEMPERATURE: 98 F

## 2023-06-07 DIAGNOSIS — M25.512 ACUTE PAIN OF LEFT SHOULDER: ICD-10-CM

## 2023-06-07 DIAGNOSIS — M25.512 ACUTE PAIN OF LEFT SHOULDER: Primary | ICD-10-CM

## 2023-06-07 DIAGNOSIS — S42.202D CLOSED TRAUMATIC DISPLACED FRACTURE OF PROXIMAL END OF LEFT HUMERUS WITH ROUTINE HEALING, SUBSEQUENT ENCOUNTER: Primary | ICD-10-CM

## 2023-06-07 PROCEDURE — 3075F PR MOST RECENT SYSTOLIC BLOOD PRESS GE 130-139MM HG: ICD-10-PCS | Mod: CPTII,S$GLB,, | Performed by: PHYSICIAN ASSISTANT

## 2023-06-07 PROCEDURE — 1125F PR PAIN SEVERITY QUANTIFIED, PAIN PRESENT: ICD-10-PCS | Mod: CPTII,S$GLB,, | Performed by: PHYSICIAN ASSISTANT

## 2023-06-07 PROCEDURE — 99999 PR PBB SHADOW E&M-EST. PATIENT-LVL V: CPT | Mod: PBBFAC,,, | Performed by: PHYSICIAN ASSISTANT

## 2023-06-07 PROCEDURE — 3288F FALL RISK ASSESSMENT DOCD: CPT | Mod: CPTII,S$GLB,, | Performed by: PHYSICIAN ASSISTANT

## 2023-06-07 PROCEDURE — 3078F DIAST BP <80 MM HG: CPT | Mod: CPTII,S$GLB,, | Performed by: PHYSICIAN ASSISTANT

## 2023-06-07 PROCEDURE — 3008F BODY MASS INDEX DOCD: CPT | Mod: CPTII,S$GLB,, | Performed by: PHYSICIAN ASSISTANT

## 2023-06-07 PROCEDURE — 1100F PR PT FALLS ASSESS DOC 2+ FALLS/FALL W/INJURY/YR: ICD-10-PCS | Mod: CPTII,S$GLB,, | Performed by: PHYSICIAN ASSISTANT

## 2023-06-07 PROCEDURE — 73030 XR SHOULDER COMPLETE 2 OR MORE VIEWS LEFT: ICD-10-PCS | Mod: 26,LT,, | Performed by: RADIOLOGY

## 2023-06-07 PROCEDURE — 1100F PTFALLS ASSESS-DOCD GE2>/YR: CPT | Mod: CPTII,S$GLB,, | Performed by: PHYSICIAN ASSISTANT

## 2023-06-07 PROCEDURE — 1159F MED LIST DOCD IN RCRD: CPT | Mod: CPTII,S$GLB,, | Performed by: PHYSICIAN ASSISTANT

## 2023-06-07 PROCEDURE — 3078F PR MOST RECENT DIASTOLIC BLOOD PRESSURE < 80 MM HG: ICD-10-PCS | Mod: CPTII,S$GLB,, | Performed by: PHYSICIAN ASSISTANT

## 2023-06-07 PROCEDURE — 1125F AMNT PAIN NOTED PAIN PRSNT: CPT | Mod: CPTII,S$GLB,, | Performed by: PHYSICIAN ASSISTANT

## 2023-06-07 PROCEDURE — 99214 OFFICE O/P EST MOD 30 MIN: CPT | Mod: S$GLB,,, | Performed by: PHYSICIAN ASSISTANT

## 2023-06-07 PROCEDURE — 73030 X-RAY EXAM OF SHOULDER: CPT | Mod: 26,LT,, | Performed by: RADIOLOGY

## 2023-06-07 PROCEDURE — 3288F PR FALLS RISK ASSESSMENT DOCUMENTED: ICD-10-PCS | Mod: CPTII,S$GLB,, | Performed by: PHYSICIAN ASSISTANT

## 2023-06-07 PROCEDURE — 1160F PR REVIEW ALL MEDS BY PRESCRIBER/CLIN PHARMACIST DOCUMENTED: ICD-10-PCS | Mod: CPTII,S$GLB,, | Performed by: PHYSICIAN ASSISTANT

## 2023-06-07 PROCEDURE — 4010F ACE/ARB THERAPY RXD/TAKEN: CPT | Mod: CPTII,S$GLB,, | Performed by: PHYSICIAN ASSISTANT

## 2023-06-07 PROCEDURE — 99214 PR OFFICE/OUTPT VISIT, EST, LEVL IV, 30-39 MIN: ICD-10-PCS | Mod: S$GLB,,, | Performed by: PHYSICIAN ASSISTANT

## 2023-06-07 PROCEDURE — 4010F PR ACE/ARB THEARPY RXD/TAKEN: ICD-10-PCS | Mod: CPTII,S$GLB,, | Performed by: PHYSICIAN ASSISTANT

## 2023-06-07 PROCEDURE — 99999 PR PBB SHADOW E&M-EST. PATIENT-LVL V: ICD-10-PCS | Mod: PBBFAC,,, | Performed by: PHYSICIAN ASSISTANT

## 2023-06-07 PROCEDURE — 3075F SYST BP GE 130 - 139MM HG: CPT | Mod: CPTII,S$GLB,, | Performed by: PHYSICIAN ASSISTANT

## 2023-06-07 PROCEDURE — 1159F PR MEDICATION LIST DOCUMENTED IN MEDICAL RECORD: ICD-10-PCS | Mod: CPTII,S$GLB,, | Performed by: PHYSICIAN ASSISTANT

## 2023-06-07 PROCEDURE — 1160F RVW MEDS BY RX/DR IN RCRD: CPT | Mod: CPTII,S$GLB,, | Performed by: PHYSICIAN ASSISTANT

## 2023-06-07 PROCEDURE — 3008F PR BODY MASS INDEX (BMI) DOCUMENTED: ICD-10-PCS | Mod: CPTII,S$GLB,, | Performed by: PHYSICIAN ASSISTANT

## 2023-06-07 PROCEDURE — 73030 X-RAY EXAM OF SHOULDER: CPT | Mod: TC,LT

## 2023-06-08 ENCOUNTER — OFFICE VISIT (OUTPATIENT)
Dept: ORTHOPEDICS | Facility: CLINIC | Age: 73
End: 2023-06-08
Payer: MEDICARE

## 2023-06-08 ENCOUNTER — HOSPITAL ENCOUNTER (OUTPATIENT)
Dept: RADIOLOGY | Facility: HOSPITAL | Age: 73
Discharge: HOME | End: 2023-06-08
Attending: PHYSICIAN ASSISTANT
Payer: MEDICARE

## 2023-06-08 VITALS — BODY MASS INDEX: 36.16 KG/M2 | WEIGHT: 225 LBS | HEIGHT: 66 IN

## 2023-06-08 DIAGNOSIS — M25.562 PAIN IN BOTH KNEES, UNSPECIFIED CHRONICITY: ICD-10-CM

## 2023-06-08 DIAGNOSIS — M21.162 ACQUIRED VARUS DEFORMITY KNEE, LEFT: ICD-10-CM

## 2023-06-08 DIAGNOSIS — M25.561 PAIN IN BOTH KNEES, UNSPECIFIED CHRONICITY: ICD-10-CM

## 2023-06-08 DIAGNOSIS — M17.0 PRIMARY OSTEOARTHRITIS OF BOTH KNEES: Primary | ICD-10-CM

## 2023-06-08 DIAGNOSIS — M21.161 ACQUIRED VARUS DEFORMITY KNEE, RIGHT: ICD-10-CM

## 2023-06-08 PROCEDURE — 73564 X-RAY EXAM KNEE 4 OR MORE: CPT | Mod: TC,50

## 2023-06-08 PROCEDURE — 73564 X-RAY EXAM KNEE 4 OR MORE: CPT | Mod: 26,50,, | Performed by: RADIOLOGY

## 2023-06-08 PROCEDURE — 99214 OFFICE O/P EST MOD 30 MIN: CPT | Mod: 25,S$GLB,, | Performed by: PHYSICIAN ASSISTANT

## 2023-06-08 PROCEDURE — 99214 PR OFFICE/OUTPT VISIT, EST, LEVL IV, 30-39 MIN: ICD-10-PCS | Mod: 25,S$GLB,, | Performed by: PHYSICIAN ASSISTANT

## 2023-06-08 PROCEDURE — 99999 PR PBB SHADOW E&M-EST. PATIENT-LVL IV: ICD-10-PCS | Mod: PBBFAC,,, | Performed by: PHYSICIAN ASSISTANT

## 2023-06-08 PROCEDURE — 73564 XR KNEE ORTHO BILAT WITH FLEXION: ICD-10-PCS | Mod: 26,50,, | Performed by: RADIOLOGY

## 2023-06-08 PROCEDURE — 1101F PT FALLS ASSESS-DOCD LE1/YR: CPT | Mod: CPTII,S$GLB,, | Performed by: PHYSICIAN ASSISTANT

## 2023-06-08 PROCEDURE — 3008F BODY MASS INDEX DOCD: CPT | Mod: CPTII,S$GLB,, | Performed by: PHYSICIAN ASSISTANT

## 2023-06-08 PROCEDURE — 20610 LARGE JOINT ASPIRATION/INJECTION: BILATERAL KNEE: ICD-10-PCS | Mod: 50,S$GLB,, | Performed by: PHYSICIAN ASSISTANT

## 2023-06-08 PROCEDURE — 20610 DRAIN/INJ JOINT/BURSA W/O US: CPT | Mod: 50,S$GLB,, | Performed by: PHYSICIAN ASSISTANT

## 2023-06-08 PROCEDURE — 4010F PR ACE/ARB THEARPY RXD/TAKEN: ICD-10-PCS | Mod: CPTII,S$GLB,, | Performed by: PHYSICIAN ASSISTANT

## 2023-06-08 PROCEDURE — 1101F PR PT FALLS ASSESS DOC 0-1 FALLS W/OUT INJ PAST YR: ICD-10-PCS | Mod: CPTII,S$GLB,, | Performed by: PHYSICIAN ASSISTANT

## 2023-06-08 PROCEDURE — 1159F PR MEDICATION LIST DOCUMENTED IN MEDICAL RECORD: ICD-10-PCS | Mod: CPTII,S$GLB,, | Performed by: PHYSICIAN ASSISTANT

## 2023-06-08 PROCEDURE — 3008F PR BODY MASS INDEX (BMI) DOCUMENTED: ICD-10-PCS | Mod: CPTII,S$GLB,, | Performed by: PHYSICIAN ASSISTANT

## 2023-06-08 PROCEDURE — 1125F PR PAIN SEVERITY QUANTIFIED, PAIN PRESENT: ICD-10-PCS | Mod: CPTII,S$GLB,, | Performed by: PHYSICIAN ASSISTANT

## 2023-06-08 PROCEDURE — 3288F PR FALLS RISK ASSESSMENT DOCUMENTED: ICD-10-PCS | Mod: CPTII,S$GLB,, | Performed by: PHYSICIAN ASSISTANT

## 2023-06-08 PROCEDURE — 1159F MED LIST DOCD IN RCRD: CPT | Mod: CPTII,S$GLB,, | Performed by: PHYSICIAN ASSISTANT

## 2023-06-08 PROCEDURE — 1125F AMNT PAIN NOTED PAIN PRSNT: CPT | Mod: CPTII,S$GLB,, | Performed by: PHYSICIAN ASSISTANT

## 2023-06-08 PROCEDURE — 99999 PR PBB SHADOW E&M-EST. PATIENT-LVL IV: CPT | Mod: PBBFAC,,, | Performed by: PHYSICIAN ASSISTANT

## 2023-06-08 PROCEDURE — 3288F FALL RISK ASSESSMENT DOCD: CPT | Mod: CPTII,S$GLB,, | Performed by: PHYSICIAN ASSISTANT

## 2023-06-08 PROCEDURE — 4010F ACE/ARB THERAPY RXD/TAKEN: CPT | Mod: CPTII,S$GLB,, | Performed by: PHYSICIAN ASSISTANT

## 2023-06-08 RX ORDER — LIDOCAINE HYDROCHLORIDE 10 MG/ML
5 INJECTION INFILTRATION; PERINEURAL
Status: DISCONTINUED | OUTPATIENT
Start: 2023-06-08 | End: 2023-06-08 | Stop reason: HOSPADM

## 2023-06-08 RX ORDER — CELECOXIB 200 MG/1
200 CAPSULE ORAL DAILY
Qty: 30 CAPSULE | Refills: 2 | Status: SHIPPED | OUTPATIENT
Start: 2023-06-08 | End: 2023-09-11

## 2023-06-08 RX ORDER — METHYLPREDNISOLONE ACETATE 80 MG/ML
80 INJECTION, SUSPENSION INTRA-ARTICULAR; INTRALESIONAL; INTRAMUSCULAR; SOFT TISSUE
Status: DISCONTINUED | OUTPATIENT
Start: 2023-06-08 | End: 2023-06-08 | Stop reason: HOSPADM

## 2023-06-08 RX ORDER — DICLOFENAC SODIUM 10 MG/G
2 GEL TOPICAL 3 TIMES DAILY
Qty: 200 G | Refills: 2 | Status: SHIPPED | OUTPATIENT
Start: 2023-06-08 | End: 2023-09-11

## 2023-06-08 RX ADMIN — LIDOCAINE HYDROCHLORIDE 5 ML: 10 INJECTION INFILTRATION; PERINEURAL at 11:06

## 2023-06-08 RX ADMIN — METHYLPREDNISOLONE ACETATE 80 MG: 80 INJECTION, SUSPENSION INTRA-ARTICULAR; INTRALESIONAL; INTRAMUSCULAR; SOFT TISSUE at 11:06

## 2023-06-08 NOTE — PROCEDURES
Large Joint Aspiration/Injection: bilateral knee    Date/Time: 6/8/2023 11:00 AM  Performed by: ISMAEL Mooney  Authorized by: ISMAEL Mooney     Consent Done?:  Yes (Verbal)  Indications:  Arthritis, joint swelling and pain  Site marked: the procedure site was marked      Local anesthesia used?: Yes    Local anesthetic:  Topical anesthetic    Details:  Needle Size:  22 G  Approach:  Anterolateral  Location:  Knee  Laterality:  Bilateral  Site:  Bilateral knee  Medications (Right):  5 mL LIDOcaine HCL 10 mg/ml (1%) 10 mg/mL (1 %); 80 mg methylPREDNISolone acetate 80 mg/mL  Medications (Left):  5 mL LIDOcaine HCL 10 mg/ml (1%) 10 mg/mL (1 %); 80 mg methylPREDNISolone acetate 80 mg/mL  Patient tolerance:  Patient tolerated the procedure well with no immediate complications     Verbal consent was obtained  The patient's ID, site, side was verified  The site was sterile prepped in standard fashion  The injection was performed in the cezar-lateral side without complication  A sterile Band-Aid was applied    Patient was directed to apply ice today at roughly 15 minutes at a time as needed.  It was discussed that they may be sore for the next few days or so.  Please avoid strenuous activity over the next 24 hours.  It was also discussed that the patient may have a increase in glucose if diabetic and should monitor levels.  Patient was instructed to call as needed.

## 2023-06-08 NOTE — PROGRESS NOTES
Patient ID: Rodolfo Alvarez is a 72 y.o. male.    Chief Complaint: No chief complaint on file.      HPI: Rodolfo Alvarez  is a 72 y.o. male who c/o No chief complaint on file.       Patient presents as a new patient to me today with chief complaint of bilateral knee pain.  Patient states this has been ongoing for 20 years or so but has increased quite significantly over the last year.  He states he had a fall roughly 6 weeks ago that resulted in a humerus fracture for which he is being established by our Trauma Department.  He is treated non operatively with conservative measures.  He is using a cane to assist with ambulation.  His son who accompanies him to his appointment states additionally he is started to shuffle his gait.  He notes pain is at worst a 7/10 having a hard time walking long distances, going from a sitting to standing standing to seated position, on level terrain or stairs.  The patient states additionally he has increasing morning stiffness and a very hard time going up stairs for which he needs to be able to accomplish to get into his condo.  He has been using Voltaren gel topically with minimal at best relief along with Tylenol.  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    Past Medical History:   Diagnosis Date    HLD (hyperlipidemia)     Hypertension        No past surgical history on file.    No family history on file.    Social History     Socioeconomic History    Marital status: Single   Tobacco Use    Smoking status: Never    Smokeless tobacco: Never   Substance and Sexual Activity    Alcohol use: Not Currently    Drug use: Never    Sexual activity: Not Currently       Medication  List with Changes/Refills   Current Medications    ACETAMINOPHEN (TYLENOL) 325 MG TABLET    Take 325 mg by mouth every 6 (six) hours as needed for Pain.    AMLODIPINE (NORVASC) 10 MG TABLET    Take 10 mg by mouth.    ASCORBIC ACID (VITAMIN C ORAL)    Take by mouth once daily.    ASPIRIN (ECOTRIN) 81 MG EC TABLET    Take 81 mg by mouth once daily.    ATORVASTATIN (LIPITOR) 20 MG TABLET    Take 20 mg by mouth.    ATORVASTATIN (LIPITOR) 20 MG TABLET    Take 1 tablet by mouth once daily.    CAFFEINE ORAL    Take by mouth daily as needed.    DICLOFENAC SODIUM (VOLTAREN) 1 % GEL    Apply 2 g topically daily as needed.    DOCOSAHEXAENOIC ACID/EPA (FISH OIL ORAL)    Take by mouth once daily.    DOCUSATE SODIUM (STOOL SOFTENER ORAL)    Take by mouth daily as needed.    HYDROCODONE-ACETAMINOPHEN (NORCO) 5-325 MG PER TABLET    Take 1 tablet by mouth every 8 (eight) hours as needed for Pain.    IBUPROFEN (ADVIL,MOTRIN) 200 MG TABLET    Take 200 mg by mouth every 6 (six) hours as needed for Pain.    LISINOPRIL (PRINIVIL,ZESTRIL) 40 MG TABLET    Take 40 mg by mouth.    LORATADINE ORAL    Take by mouth daily as needed.    MELATONIN 5 MG CAP    Take by mouth nightly as needed.    MULTIVIT-MIN/FOLIC/VIT K/LYCOP (MEN'S 50 PLUS MULTIVITAMIN ORAL)    Take by mouth once daily.    MV-MN/IRON/FOLIC ACID/HERB 190 (VITAMIN D3 COMPLETE ORAL)    Take by mouth once daily.    PSYLLIUM SEED, WITH SUGAR, (FIBER ORAL)    Take by mouth daily as needed.    TAMSULOSIN (FLOMAX) 0.4 MG CAP    TAKE 1 CAPSULE BY MOUTH AT BEDTIME  DAYS.    TRAZODONE (DESYREL) 50 MG TABLET    Take 50 mg by mouth every evening.       Review of patient's allergies indicates:  No Known Allergies      Objective:     Lower Extremity    Right KNEE:  ROM: passive flex/ ext full 0-120 degree  Patella midling, moderate crepitus noted   Ligaments stable  Pain on palpation to medial and lateral aspect  Calf NT, soft  Mild edema present  (-) Bijan sign  DF/PF full  Wiggles  toes  Sensation intact to light touch   No pitting edema appreciated   NVI  Cap refill < 2 sec    Left  KNEE:  ROM: passive flex/ ext noted to have roughly 3-5 degrees extension contraction and full flexion of 120°  Patella midling, mild crepitus noted   Ligaments stable  Pain on palpation to medial and lateral aspect  Calf NT, soft   No edema appreciated  (-) Bijan sign  DF/PF full  Wiggles toes  Sensation intact to light touch   No pitting edema appreciated   NVI  Cap refill < 2 sec  Skin warm to touch, no obvious lesion noted       IMAGING:    XRAY:  FINDINGS:  No fractures nor dislocations.  Tricompartmental osteophytes with total medial compartment joint space loss bilaterally.  No joint effusions.  No soft tissue calcifications.        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       Plan:       There are no diagnoses linked to this encounter.    Rodolfo flores is a new patient who presents to me today with chief complaint of bilateral knee pain, right greater than left at times. We had a long discussion today regarding degenerative arthritis in the knees. The patient understands that arthritis is chronic and will worsen over time.  The patient also understands that arthritis may cause episodic flare-ups in pain. Management or if arthritis is achieved through a multi-modal approach including weight loss in obese individuals, activity modification, NSAIDs (topical vs oral) where appropriate, periodic intra-articular steroid injections, viscosupplementation, physical therapy, knee bracing, ambulatory aids, as well as geniculate nerve blocks.  The patient elected to proceed with bilateral short acting steroid injections today.  I asked that he refrain from standing water for 24 hours.  He may advance activity as tolerated with elevation and ice  as needed.  Additionally we discussed beginning an NSAID.  I would like him to take Celebrex once daily with food.  I would like to see him back in 3 months for further evaluation.  Additionally I would like for him to start outpatient PT. May call with any questions or concerns in the interim.    Dr. Beck is aware of the patient & current presentation. He agrees with the current plan above.     Patient verbalized understanding of all instructions and agreed with the above plan.    No follow-ups on file.    The patient understands, chooses and consents to this plan and accepts all   the risks which include but are not limited to the risks mentioned above.     Disclaimer: This note was prepared using a voice recognition system and is likely to have sound alike errors within the text.

## 2023-06-15 ENCOUNTER — CLINICAL SUPPORT (OUTPATIENT)
Dept: REHABILITATION | Facility: HOSPITAL | Age: 73
End: 2023-06-15
Payer: MEDICARE

## 2023-06-15 DIAGNOSIS — R52 PAIN: ICD-10-CM

## 2023-06-15 DIAGNOSIS — Z74.09 DECREASED STRENGTH, ENDURANCE, AND MOBILITY: ICD-10-CM

## 2023-06-15 DIAGNOSIS — Z78.9 DECREASED ACTIVITIES OF DAILY LIVING (ADL): ICD-10-CM

## 2023-06-15 DIAGNOSIS — M25.662 DECREASED RANGE OF MOTION OF BOTH KNEES: ICD-10-CM

## 2023-06-15 DIAGNOSIS — M25.561 CHRONIC PAIN OF BOTH KNEES: Primary | ICD-10-CM

## 2023-06-15 DIAGNOSIS — M17.0 PRIMARY OSTEOARTHRITIS OF BOTH KNEES: ICD-10-CM

## 2023-06-15 DIAGNOSIS — R53.1 DECREASED STRENGTH: ICD-10-CM

## 2023-06-15 DIAGNOSIS — R53.1 DECREASED STRENGTH, ENDURANCE, AND MOBILITY: ICD-10-CM

## 2023-06-15 DIAGNOSIS — S42.202D CLOSED TRAUMATIC DISPLACED FRACTURE OF PROXIMAL END OF LEFT HUMERUS WITH ROUTINE HEALING, SUBSEQUENT ENCOUNTER: ICD-10-CM

## 2023-06-15 DIAGNOSIS — M25.562 CHRONIC PAIN OF BOTH KNEES: Primary | ICD-10-CM

## 2023-06-15 DIAGNOSIS — R68.89 DECREASED STRENGTH, ENDURANCE, AND MOBILITY: ICD-10-CM

## 2023-06-15 DIAGNOSIS — M25.661 DECREASED RANGE OF MOTION OF BOTH KNEES: ICD-10-CM

## 2023-06-15 DIAGNOSIS — M25.612 DECREASED RANGE OF MOTION OF LEFT SHOULDER: ICD-10-CM

## 2023-06-15 DIAGNOSIS — G89.29 CHRONIC PAIN OF BOTH KNEES: Primary | ICD-10-CM

## 2023-06-15 PROCEDURE — 97110 THERAPEUTIC EXERCISES: CPT | Performed by: PHYSICAL THERAPIST

## 2023-06-15 PROCEDURE — 97162 PT EVAL MOD COMPLEX 30 MIN: CPT | Performed by: PHYSICAL THERAPIST

## 2023-06-15 PROCEDURE — 97110 THERAPEUTIC EXERCISES: CPT

## 2023-06-15 PROCEDURE — 97166 OT EVAL MOD COMPLEX 45 MIN: CPT

## 2023-06-15 NOTE — PLAN OF CARE
[facility]   Physical Therapy Initial Evaluation      Date: 6/15/2023   Name: Rodolfo Alvarez  Clinic Number: [id number]    Therapy Diagnosis:    Encounter Diagnoses   Name Primary?    Chronic pain of both knees Yes    Decreased range of motion of both knees     Decreased strength, endurance, and mobility     Primary osteoarthritis of both knees       Physician: Katelynn Azul PA     Physician Orders: PT Eval and Treat  Medical Diagnosis from Referral: primary osteoarthritis of both knees  Evaluation Date: 6/15/2023  Authorization Period Expiration: 11/11/2023  Plan of Care Expiration: 9/13/2023  Progress Note Due: 7/15/2023  Visit # / Visits authorized: 1/1   FOTO: 1/3 (last performed on 6/15/2023)    Precautions: Standard and recent humerus fracture    Time In: 1435  Time Out: 1525  Total Billable Time (timed & untimed codes): 50 minutes    Subjective     Date of onset: March of 2023    History of current condition - Rodolfo reports that about 6 months ago he started to notice some balance deficits, and he had a couple of falls. He started using a cane about 6 months ago as well. One fall he did not hurt himself, but the second fall (end of March 2023), he landed on his left shoulder and broke his humerus. Patient reports that he has a history of multi-site arthritis, and he typically has pain in both of his knees. He received cortisone injections in his knees last week, and this has helped with the pain. Patient lives in a second floor [facility] and does have stairs to get up and down. Patient was receiving home health PT, and he was discharged about two weeks ago.     Falls: 2 falls    Imaging: [x] Xray [] MRI [] CT: Performed on: 6/8/2023    Pain:  Current 0/10, worst 7/10, best 0/10   Location: [x] Right   [x] Left:  bilateral knee pain, R>L  Description: aching   Aggravating Factors: stairs, prolonged standing/walking, mornings  Easing Factors: activity avoidance, rest, voltaren gel,  injection    Prior Therapy:   [] N/A    [x] Yes: home health PT  Social History: Pt lives alone  Occupation: Pt is retired.   Prior Level of Function: Independent and pain free with all ADL, IADL, community mobility and functional activities.   Current Level of Function: Independent with all ADL, IADL, community mobility and functional activities with reports of increased pain and need for increased time and frequent breaks.      Dominant Extremity:    [x] Right    [] Left    Pts goals: Pt reported goals are to decrease overall pain levels in order to return to prior functional level.     Medical History:   Past Medical History:   Diagnosis Date    HLD (hyperlipidemia)     Hypertension        Surgical History:   Rodolfo Alvarez  has no past surgical history on file.    Medications:   Rodolfo has a current medication list which includes the following prescription(s): acetaminophen, amlodipine, ascorbic acid, aspirin, atorvastatin, atorvastatin, caffeine, celecoxib, diclofenac sodium, docosahexaenoic acid/epa, docusate sodium, hydrocodone-acetaminophen, ibuprofen, lisinopril, loratadine, melatonin, multivit-min/folic/vit k/lycop, mv-mn/iron/folic acid/herb 190, psyllium seed (with sugar), tamsulosin, and trazodone.    Allergies:   Review of patient's allergies indicates:  No Known Allergies     Objective        RANGE OF MOTION:   Knee AROM/PROM Right Left Pain/Dysfunction with Movement Goal   Knee Flexion (135º) 124 130 Minimal discomfort in L knee, pain in R knee 130   Knee Extension (0º) -12 -6 Pain in R knee 0       STRENGTH:   L/E MMT Right Left Pain/Dysfunction with Movement Goal   Hip Flexion  4+/5 4+/5 No pain with MMT's this visit 4+/5 B   Hip Extension  3+/5 3+/5  4+/5 B   Hip Abduction  3+/5 3+/5  4+/5 B   Knee Extension 4+/5 4+/5  5/5 B   Knee Flexion 4+/5 4+/5  5/5 B   Hip IR 4-/5 4-/5  4+/5 B   Hip ER 4-/5 4-/5  4+/5 B   Ankle DF 4+/5 4+/5  5/5 B   Ankle PF 4+/5 4+/5  5/5 B       MUSCLE LENGTH:   Muscle  Tested  Right Left  Goal   Hip Flexors [] Normal  [x] Limited [] Normal  [x] Limited Normal B   ITB / TFL [] Normal  [x] Limited [] Normal  [x] Limited Normal B   Quadriceps [] Normal  [x] Limited [] Normal  [x] Limited Normal B   Hamstrings  [] Normal  [] Limited [] Normal  [x] Limited Normal B   Gastrocnemius  [] Normal  [x] Limited [] Normal  [x] Limited Normal B   Soleus  [] Normal  [x] Limited [] Normal  [x] Limited Normal B       JOINT MOBILITY:   Joint Motion Right Mobility  (spine) Left Mobility Goal   Distal Femur AP [x] Hypo     [] Normal     [] Hyper [x] Hypo     [] Normal     [] Hyper Normal    Proximal Tibia AP [x] Hypo     [] Normal     [] Hyper [x] Hypo     [] Normal     [] Hyper Normal    Patellar Medial Glide  [] Hypo     [x] Normal     [] Hyper [] Hypo     [x] Normal     [] Hyper Normal    Patellar Lateral Glide  [] Hypo     [x] Normal     [] Hyper [] Hypo     [x] Normal     [] Hyper Normal    Patellar Superior Glide  [x] Hypo     [] Normal     [] Hyper [x] Hypo     [] Normal     [] Hyper Normal    Patellar Inferior Glide  [x] Hypo     [] Normal     [] Hyper [x] Hypo     [] Normal     [] Hyper Normal        SPECIAL TESTS:    Right  (spine) Left  Goal   Varus Stress Test [x] Positive    [] Negative [] Positive    [x] Negative Negative B    Valgus Stress Test [x] Positive    [] Negative [] Positive    [x] Negative Negative B        SENSATION  [x] Intact to Light Touch   [] Impaired:      PALPATION: Muscles: Increased tone and tenderness to palpation of: bilateral quadriceps, hamstrings, hip adductors, pes anserine, gastrocnemius.       POSTURE:  Pt presents with postural abnormalities which include:    [x] Forward Head   [] Increased Lumbar Lordosis   [x] Rounded Shoulder   [] Genu Recurvatum   [] Increased Thoracic Kyphosis [x] Genu Valgus - L LE   [] Trunk Deviated    [] Pes Planus   [] Scapular Winging    [x] Other: Genu Varus R LE      GAIT ANALYSIS The patient ambulated with the following  assistive device: straight cane; the pt presents with the following gait abnormalities: bradykinetic, increased ALVARADO, decreased step length bilateral, decreased knee flexion on right, and decreased knee extension bilateral      FUNCTIONAL MOVEMENT PATTERNS  Movement Analysis Notes   Bed Mobility  []Functional  [x]Dysfunctional:  [x]Painful  []Non-Painful    Required min A from supine to sitting.    Sit to Stand []Functional  [x]Dysfunctional:  [x]Painful  []Non-Painful    Decreased WB on R LE, uses UE support   Squat []Functional  [x]Dysfunctional:  [x]Painful  []Non-Painful           Function:     CMS Impairment/Limitation/Restriction for FOTO Knee Survey    Therapist reviewed FOTO scores for Rodolfo on 6/15/2023.   FOTO documents entered into EPIC - see Media section.    Limitation Score: 53%         Treatment     Total Treatment time (time-based codes) separate from Evaluation: (10) minutes     Rodolfo received the treatments listed below:        THERAPEUTIC EXERCISES to develop strength, endurance, ROM, flexibility, posture, and core stabilization for (10) minutes including:    Intervention Performed Today    HEP established and discussed x See Patient Instructions for details                                        Plan for Next Visit:        Patient Education and Home Exercises     Education provided:   PURPOSE: Patient educated on the impairments noted above and the effects of physical therapy intervention to improve overall condition and QOL.   EXERCISE: Patient was educated on all the above exercise prior/during/after for proper posture, positioning, and execution for safe performance with home exercise program.   STRENGTH: Patient educated on the importance of improved core and extremity strength in order to improve alignment of the spine and extremities with static positions and dynamic movement.   GAIT & BALANCE: Patient educated on the importance of strong core and lower extremity musculature in order to  improve both static and dynamic balance, improve gait mechanics, reduce fall risk and improve household and community mobility.   POSTURE: Patient educated on postural awareness to reduce stress and maintain optimal alignment of the spine with static positions and dynamic movement     Written Home Exercises Provided: yes.  Exercises were reviewed and Rodolfo was able to demonstrate them prior to the end of the session.  Rodolfo demonstrated good  understanding of the education provided. See EMR under Patient Instructions for exercises provided during therapy sessions.    Assessment     Rodolfo is a 72 y.o. male referred to outpatient Physical Therapy with a medical diagnosis of primary osteoarthritis of both knees. Pt presents with impairments in the following categories: IMPAIRMENTS: ROM, strength, endurance, joint mobility, muscle length, balance, posture, gait mechanics, core strength and stability, and functional movement patterns    Pt prognosis is Fair  Pt will benefit from skilled outpatient Physical Therapy to address the deficits stated above and in the chart below, provide pt/family education, and to maximize pt's level of independence.     Plan of care discussed with patient: Yes  Pt's spiritual, cultural and educational needs considered and patient is agreeable to the plan of care and goals as stated below:     Anticipated Barriers for therapy: co-morbidities, sedentary lifestyle, chronicity of condition, and adherence to treatment plan    Medical Necessity is demonstrated by the following  History  Co-morbidities and personal factors that may impact the plan of care [] LOW: no personal factors / co-morbidities  [x] MODERATE: 1-2 personal factors / co-morbidities  [] HIGH: 3+ personal factors / co-morbidities    Moderate / High Support Documentation:   Past Medical History:   Diagnosis Date    HLD (hyperlipidemia)     Hypertension         Examination  Body Structures and Functions, activity limitations and  "participation restrictions that may impact the plan of care [] LOW: addressing 1-2 elements  [x] MODERATE: 3+ elements  [] HIGH: 4+ elements (please support below)    Moderate / High Support Documentation: See above in "Current Level of Function"      Clinical Presentation [] LOW: stable  [x] MODERATE: Evolving  [] HIGH: Unstable     Decision Making/ Complexity Score: moderate         Short Term Goals:  6 weeks Status  Date Met   PAIN: Pt will report worst pain of 5/10 in order to progress toward max functional ability and improve quality of life. [x] Progressing  [] Met  [] Not Met    FUNCTION: Patient will demonstrate improved function as indicated by a functional limitation score of less than or equal to 48 out of 100 on FOTO. [x] Progressing  [] Met  [] Not Met    STRENGTH: Patient will improve strength to 50% of stated goals, listed in objective measures above, in order to progress towards independence with functional activities. [x] Progressing  [] Met  [] Not Met    POSTURE: Patient will correct postural deviations in sitting and standing, to decrease pain and promote long term stability.  [x] Progressing  [] Met  [] Not Met    GAIT: Patient will demonstrate improved gait mechanics in order to improve functional mobility, improve quality of life, and decrease risk of further injury or fall.  [x] Progressing  [] Met  [] Not Met    HEP: Patient will demonstrate independence with HEP in order to progress toward functional independence. [x] Progressing  [] Met  [] Not Met      Long Term Goals:  12 weeks Status Date Met   PAIN: Pt will report worst pain of 3/10 in order to progress toward max functional ability and improve quality of life [x] Progressing  [] Met  [] Not Met    FUNCTION: Patient will demonstrate improved function as indicated by a functional limitation score of less than or equal to 42 out of 100 on FOTO. [x] Progressing  [] Met  [] Not Met    MOBILITY: Patient will improve AROM to stated goals, " listed in objective measures above, in order to return to maximal functional potential and improve quality of life.  [x] Progressing  [] Met  [] Not Met    STRENGTH: Patient will improve strength to stated goals, listed in objective measures above, in order to improve functional independence and quality of life.  [x] Progressing  [] Met  [] Not Met    GAIT: Patient will demonstrate normalized gait mechanics with minimal compensation in order to return to PLOF. [x] Progressing  [] Met  [] Not Met    Patient will return to normal ADL's, IADL's, community involvement, recreational activities, and work-related activities with less than or equal to 2/10 pain and maximal function.  [x] Progressing  [] Met  [] Not Met      Plan     Plan of care Certification: 6/15/2023 to 9/13/2023.    Outpatient Physical Therapy 2 times weekly for 12 weeks to include any combination of the following interventions: virtual visits, dry needling, modalities, electrical stimulation (IFC, Pre-Mod, Attended with Functional Dry Needling), Aquatic Therapy, Cervical/Lumbar Traction, Gait Training, Manual Therapy, Neuromuscular Re-ed, Patient Education, Self Care, Therapeutic Exercise, and Therapeutic Activites     Irma Garzon, PT, DPT

## 2023-06-15 NOTE — PLAN OF CARE
[facility]   Occupational Therapy Initial Evaluation     Date: 6/15/2023  Name: Rodolfo Alvarez  Clinic Number: [id number]    Therapy Diagnosis:   Encounter Diagnoses   Name Primary?    Closed traumatic displaced fracture of proximal end of left humerus with routine healing, subsequent encounter     Decreased range of motion of left shoulder     Decreased strength     Decreased activities of daily living (ADL)     Pain      Physician: Nirmal Muro PA-C    Physician Orders: Occupational Therapy to Evaluate and Treat  Medical Diagnosis: S42.202D (ICD-10-CM) - Closed traumatic displaced fracture of proximal end of left humerus with routine healing, subsequent encounter  Surgical Procedure and Date: N/A    Evaluation Date: 6/15/2023  Insurance Authorization Period Expiration: 2023 - 2023  Plan of Care Certification Period: 6/15/2023 - 9/15/2023  Progress Note Due: 7/15/2023    Date of Return to MD: N/A  Visit # / Visits authorized:   FOTO: 1/3    Precautions:  Standard, left Humerus Fx on 3/30/23    Time In: 3:30  Time Out: 4:15  Total Appointment Time (timed & untimed codes): 45 minutes    SUBJECTIVE     Date of Onset: 3/30/23  Mechanism of Injury/ History of Current Condition: Patient had a fall on to his left side fracturing his left shoulder. X-Ray were obtained which showed a Comminuted fracture of the left humeral head.  AC joint hypertrophy. Patient choose conservative management with sling and immobilization.     Involved Side: left  Dominant Side: Left    Imagin2023: Continued periosteal new bone formation about the left humeral neck fracture.    Previous Therapy: Patient had home health therapy for his shoulder    Patient's Goals for Therapy: Patient reported goals are to decrease overall pain levels and improve range of motion in order to return to prior functional level.    Falls: 3/30/2023    Pain:  Functional Pain Scale Rating 0-10:   4/10 on average  3/10 at  best  7/10 at worst  Location: left shoulder  Description: Aching  Aggravating Factors: movement  Easing Factors: rest    Occupation: Manual Labor  Working Presently: Retired    Functional Limitations/Social History:    Prior Level of Function: Independent and pain free with all ADL, IADL, community mobility and functional activities.   Current Level of Function: Min A to Mod I with all ADL, IADL, community mobility and functional activities with reports of increased pain and need for increased time and frequent breaks.    Limitation of Functional Status as follows:   ADLs/IADLs:     - Feeding: modified independent with pain    - Bathing: modified independent with pain    - Dressing modified independent with pain  - Grooming: modified independent with pain    - Driving: modified independent  with pain  - Leisure: Relaxing     Home/Living Environment : lives alone  Home Access: Single Story  DME: single point cane    Past Medical History/Physical Systems Review:     Medical History:   Past Medical History:   Diagnosis Date    HLD (hyperlipidemia)     Hypertension        Surgical History:    has no past surgical history on file.    Medications:   has a current medication list which includes the following prescription(s): acetaminophen, amlodipine, ascorbic acid, aspirin, atorvastatin, atorvastatin, caffeine, celecoxib, diclofenac sodium, docosahexaenoic acid/epa, docusate sodium, hydrocodone-acetaminophen, ibuprofen, lisinopril, loratadine, melatonin, multivit-min/folic/vit k/lycop, mv-mn/iron/folic acid/herb 190, psyllium seed (with sugar), tamsulosin, and trazodone.    Allergies:   Review of patient's allergies indicates:  No Known Allergies       OBJECTIVE     RANGE OF MOTION:      Shoulder AROM/PROM Right Left Pain/Dysfunction with Movement Goal  Left   Shoulder Flexion (180) Within normal limits  70  120   Shoulder Extension (60)  10  20   Shoulder Abduction (180)  65  90   Shoulder External Rotation (90)  20  30    Shoulder Internal Rotation (70)  15  25     Elbow AROM/PROM Right Left Pain/Dysfunction with Movement Goal   Elbow Flexion (150) Within normal limits  Within normal limits      Elbow Extension (0)         Comments:      STRENGTH:    U/E MMT Right Left Pain/Dysfunction with Movement Goal   Shoulder Flexion 4+/5 3-/5  3+/5   Shoulder Extension 4+/5 3-/5  3+/5   Shoulder Abduction 4+/5 3-/5  3+/5   Shoulder Internal Rotation 4+/5 3-/5  3+/5   Shoulder External Rotation   4+/5 3-/5  3+/5   Elbow Flexion  5/5 4/5  4+/5   Elbow Extension 5/5 4/5  4+/5     Comments:        and Pinch Strength (in pounds, psi's):   Left Right Goal    6/15/2023 6/15/2023     II          Initial Observation: Patient demonstrates decreased range of motion, tightness and weakness of left upper extremity     Sensation:  Sensation is intact to light touch in B upper extremities    Palpation: Increased tone and tenderness noted with palpation of left upper trapezius, rhomboids , rotator cuff muscles, deltoid.     Posture:  Patient presents with postural abnormalities which include: forward head increase thoracic kyphosis           Limitation/Restriction for FOTO Shoulder fractureSurvey    Therapist reviewed FOTO scores for Rodolfo Alvarez on 6/15/2023.   FOTO documents entered into [employer] - see Media section.    Limitation Score: 35%         Treatment   Total Treatment time (time-based codes) separate from Evaluation: 10 minutes      Rodolfo received therapeutic exercises for 10 minutes including:  - Table top active assist range of motion   - external rotation (to tolerance) 10x - 10 second holds   -  shoulder flexion   10x - 10 second holds   - shoulder abduction   10x - 10 second holds  - wall slides  10x - 10 second holds      Patient Education and Home Exercises      Home Exercise Program/Education:    Education provided:   Patient educated on the impairments noted above and the effects of Occupational therapy intervention to improve  overall condition and Quality of Life.   Patient was educated on all the above exercise prior/during/after for proper posture, positioning, and execution for safe performance with home exercise program.  Patient/Family Education: role of Occupational Therapy, goals for Occupational Therapy, scheduling/cancellations, and insurance limitations - patient verbalized understanding  Home Exercise Program - See Below    Written Home Exercises Provided: yes.  Exercises were reviewed and Rodolfo was able to demonstrate them prior to the end of the session. Patient was advised to perform these exercises free of pain, and to stop performing them if pain occurs.  Rodolfo demonstrated good  understanding of the education provided.     See EMR under Patient Instructions for exercises provided 6/15/2023.    ASSESSMENT       Rodolfo Alvarez is a 72 y.o. male referred to outpatient occupational therapy and presents with a medical diagnosis of Closed traumatic displaced fracture of proximal end of left humerus with routine healing, subsequent encounter.    Following medical record review it is determined that pt will benefit from occupational therapy services in order to maximize pain free and/or functional use of left upper extremity. The following goals were discussed with the patient and patient is in agreement with them as to be addressed in the treatment plan. The patient's rehab potential is Good.     Anticipated barriers to occupational therapy: Pain, home exercise program compliance    Plan of care discussed with patient: Yes  Patient's spiritual, cultural and educational needs considered and patient is agreeable to the plan of care and goals as stated below:     Medical Necessity is demonstrated by the following  Occupational Profile/History  Co-morbidities and personal factors that may impact the plan of care [] LOW: Brief chart review  [x] MODERATE: Expanded chart review   [] HIGH: Extensive chart review    Moderate / High  Support Documentation:   Past Medical History:   Diagnosis Date    HLD (hyperlipidemia)     Hypertension         Examination  Performance deficits relating to physical, cognitive or psychosocial skills that result in activity limitations and/or participation restrictions  [] LOW: addressing 1-3 Performance deficits  [x] MODERATE: 3-5 Performance deficits  [] HIGH: 5+ Performance deficits (please support below)    Moderate / High Support Documentation:    Physical:  Joint Mobility  Joint Stability  Muscle Power/Strength  Muscle Endurance  Control of Voluntary Movement   Strength  Muscle Tone  Pain    Cognitive:  Safety Awareness/Insight to Disability    Psychosocial:    No Deficits     Treatment Options [] LOW: Limited options  [x] MODERATE: Several options  [] HIGH: Multiple options      Decision Making/ Complexity Score: moderate       The following goals were discussed with the patient and patient is in agreement with them as to be addressed in the treatment plan.       Goals:    Short Term Goals:  6 weeks  Progress   6/15/2023   Pain: Patient will demonstrate improved pain by reports of less than or equal to 4/10 worst pain on the verbal rating scale in order to progress toward maximal functional ability and improve quality of life. PC   Function: Patient will demonstrate improved function as indicated by a functional limitation score of less than or equal to 33 out of 100 on FOTO. PC   Mobility: Patient will improve AROM to 50% of stated goals, listed in objective measures above, in order to progress towards independence with functional activities.  PC   Strength: Patient will improve strength to 50% of stated goals, listed in objective measures above, in order to progress towards independence with functional activities.  PC   HEP: Patient will demonstrate independence with HEP in order to progress toward functional independence. PC     Long Term Goals:  12 weeks  Progress  6/15/2023   Pain: Patient will  demonstrate improved pain by reports of less than or equal to 3/10 worst pain on the verbal rating scale in order to progress toward maximal functional ability and improve quality of life.   PC   Function: Patient will demonstrate improved function as indicated by a functional limitation score of less than or equal to 32 out of 100 on FOTO. PC   Mobility: Patient will improve AROM to stated goals, listed in objective measures above, in order to return to maximal functional potential and improve quality of life. PC   Strength: Patient will improve strength to stated goals, listed in objective measures above, in order to improve functional independence and quality of life. PC   Patient will return to normal ADL's, IADL's, community involvement, recreational activities, and work-related activities with less than or equal to 1/10 pain and maximal function.  PC     Goals Key:  PC= Progressing/Continue; PM= Partially Met;  GM= Goal Met      DC= Discontinue        PLAN   Plan of Care Certification: 6/15/2023 to 9/15/2023.     Outpatient Occupational Therapy 2 times weekly for 12 weeks to include the following interventions:  Therapeutic Exercise, Functional Activities, Patient Education, Home Exercise Program, ADL Training, Transfer/Mobility Training, Manual Therapy, Neuromuscular Reeducation/Sensory, Electrical Stimulation/TENS/Interferential, Moist Heat/Ice/Paraffin, and Orthotic Fabrication/Fit/Management      Estrellita Palma, OT ,OTD, OTR/L      I CERTIFY THE NEED FOR THESE SERVICES FURNISHED UNDER THIS PLAN OF TREATMENT AND WHILE UNDER MY CARE  Physician's comments:      Physician's Signature: ___________________________________________________

## 2023-06-16 PROBLEM — R52 PAIN: Status: ACTIVE | Noted: 2023-06-16

## 2023-06-16 PROBLEM — Z78.9 DECREASED ACTIVITIES OF DAILY LIVING (ADL): Status: ACTIVE | Noted: 2023-06-16

## 2023-06-16 PROBLEM — R53.1 DECREASED STRENGTH: Status: ACTIVE | Noted: 2023-06-16

## 2023-06-16 PROBLEM — M25.612 DECREASED RANGE OF MOTION OF LEFT SHOULDER: Status: ACTIVE | Noted: 2023-06-16

## 2023-06-21 ENCOUNTER — CLINICAL SUPPORT (OUTPATIENT)
Dept: REHABILITATION | Facility: HOSPITAL | Age: 73
End: 2023-06-21
Payer: MEDICARE

## 2023-06-21 DIAGNOSIS — R53.1 DECREASED STRENGTH: ICD-10-CM

## 2023-06-21 DIAGNOSIS — M25.661 DECREASED RANGE OF MOTION OF BOTH KNEES: ICD-10-CM

## 2023-06-21 DIAGNOSIS — Z78.9 DECREASED ACTIVITIES OF DAILY LIVING (ADL): ICD-10-CM

## 2023-06-21 DIAGNOSIS — R52 PAIN: ICD-10-CM

## 2023-06-21 DIAGNOSIS — M25.562 CHRONIC PAIN OF BOTH KNEES: ICD-10-CM

## 2023-06-21 DIAGNOSIS — M25.561 CHRONIC PAIN OF BOTH KNEES: ICD-10-CM

## 2023-06-21 DIAGNOSIS — R68.89 DECREASED STRENGTH, ENDURANCE, AND MOBILITY: ICD-10-CM

## 2023-06-21 DIAGNOSIS — M25.612 DECREASED RANGE OF MOTION OF LEFT SHOULDER: Primary | ICD-10-CM

## 2023-06-21 DIAGNOSIS — M25.662 DECREASED RANGE OF MOTION OF BOTH KNEES: ICD-10-CM

## 2023-06-21 DIAGNOSIS — M17.0 PRIMARY OSTEOARTHRITIS OF BOTH KNEES: Primary | ICD-10-CM

## 2023-06-21 DIAGNOSIS — Z74.09 DECREASED STRENGTH, ENDURANCE, AND MOBILITY: ICD-10-CM

## 2023-06-21 DIAGNOSIS — R53.1 DECREASED STRENGTH, ENDURANCE, AND MOBILITY: ICD-10-CM

## 2023-06-21 DIAGNOSIS — G89.29 CHRONIC PAIN OF BOTH KNEES: ICD-10-CM

## 2023-06-21 PROCEDURE — 97112 NEUROMUSCULAR REEDUCATION: CPT

## 2023-06-21 PROCEDURE — 97530 THERAPEUTIC ACTIVITIES: CPT | Performed by: PHYSICAL THERAPIST

## 2023-06-21 PROCEDURE — 97110 THERAPEUTIC EXERCISES: CPT | Performed by: PHYSICAL THERAPIST

## 2023-06-21 PROCEDURE — 97112 NEUROMUSCULAR REEDUCATION: CPT | Performed by: PHYSICAL THERAPIST

## 2023-06-21 PROCEDURE — 97140 MANUAL THERAPY 1/> REGIONS: CPT

## 2023-06-21 PROCEDURE — 97110 THERAPEUTIC EXERCISES: CPT

## 2023-06-21 NOTE — PROGRESS NOTES
"[facility]   Physical Therapy Treatment Note        Name: Rodolfo Alvarez  Clinic Number: [id number]    Therapy Diagnosis:   Encounter Diagnoses   Name Primary?    Primary osteoarthritis of both knees Yes    Chronic pain of both knees     Decreased range of motion of both knees     Decreased strength, endurance, and mobility      Physician: Katelynn Azul PA    Visit Date: 6/21/2023    Physician Orders: PT Eval and Treat  Medical Diagnosis from Referral: primary osteoarthritis of both knees  Evaluation Date: 6/15/2023  Authorization Period Expiration: 12/3/2023  Plan of Care Expiration: 9/13/2023  Progress Note Due: 7/15/2023  Visit # / Visits authorized: 1/20 (+eval)   FOTO: 1/3 (last performed on 6/15/2023)     Precautions: Standard and recent humerus fracture    PTA Visit #: 0/5     Time In: 1600  Time Out: 1653  Total Billable Time: 53 minutes (Billing reflects 1 on 1 treatment time spent with patient)    Subjective     Patient reports: that he has been feeling okay overall. He has been doing his exercises regularly, and he has been trying to walk a little each day.     He/She was compliant with home exercise program.  Response to previous treatment: no adverse reaction   Functional change: none noted yet    Pain: 3/10  (left knee today)  Location: bilateral knee pain, L>R    Objective      Objective Measures updated at progress report or POC update only unless otherwise noted.       Treatment     Rodolfo received the treatments listed below:       THERAPEUTIC EXERCISES to develop strength, endurance, ROM, flexibility, posture, and core stabilization for (10) minutes including:    Intervention Performed Today    Nustep for ROM and strength x 6' level 3   Ball squeezes  x 3', 3" holds on and off                                   Plan for Next Visit:          NEUROMUSCULAR RE-EDUCATION ACTIVITIES to improve Balance, Coordination, Kinesthetic, Sense, Proprioception, and Posture for " "(28) minutes.  The following were included:    Intervention Performed Today    Quad sets x 3', 5" holds B LE   SAQ x 3', B LE, 2#   Straight leg raises x 2 x 10, each LE   Sidelying clams x 2 x 10 each LE   LAQ x 3', B LE                    Plan for Next Visit:          THERAPEUTIC ACTIVITIES to improve dynamic and functional  performance for (15) minutes including:    Intervention Performed Today    Bridges x 2 x 10   Sit to Stands x 2 x 10 (hi-low mat), higher surface without use of UE of assistance    Standing TKE x 3 x 10 each LE, pink band                              Plan for Next Visit:           Patient Education and Home Exercises       Home Exercises Provided and Patient Education Provided     Education provided:   PURPOSE: Patient educated on the impairments noted above and the effects of physical therapy intervention to improve overall condition and QOL.   EXERCISE: Patient was educated on all the above exercise prior/during/after for proper posture, positioning, and execution for safe performance with home exercise program.   STRENGTH: Patient educated on the importance of improved core and extremity strength in order to improve alignment of the spine and extremities with static positions and dynamic movement.   GAIT & BALANCE: Patient educated on the importance of strong core and lower extremity musculature in order to improve both static and dynamic balance, improve gait mechanics, reduce fall risk and improve household and community mobility.   POSTURE: Patient educated on postural awareness to reduce stress and maintain optimal alignment of the spine with static positions and dynamic movement     Written Home Exercises Provided: yes.  Exercises were reviewed and Rodolfo was able to demonstrate them prior to the end of the session.  Rodolfo demonstrated good  understanding of the education provided. See EMR under Patient Instructions for exercises provided during therapy sessions.    Assessment "     Patient did well with treatment today. He completed exercises with only minimal difficulty due to decreased core and quadriceps strength, but without increased complaint. Patient would benefit from continued LE strengthening and especially core stabilization. Progress to more functional, standing activities per tolerance in upcoming visits.     Rodolfo is progressing well towards his goals.   Patient prognosis is Fair.     Patient will continue to benefit from skilled outpatient physical therapy to address the deficits listed in the problem list box on initial evaluation, provide pt/family education and to maximize patient's level of independence in the home and community environment.     Patient's spiritual, cultural and educational needs considered and pt agreeable to plan of care and goals.     Anticipated Barriers for therapy: co-morbidities, sedentary lifestyle, chronicity of condition, and adherence to treatment plan    Goal:  Short Term Goals:  6 weeks Status  Date Met   PAIN: Pt will report worst pain of 5/10 in order to progress toward max functional ability and improve quality of life. [x] Progressing  [] Met  [] Not Met     FUNCTION: Patient will demonstrate improved function as indicated by a functional limitation score of less than or equal to 48 out of 100 on FOTO. [x] Progressing  [] Met  [] Not Met     STRENGTH: Patient will improve strength to 50% of stated goals, listed in objective measures above, in order to progress towards independence with functional activities. [x] Progressing  [] Met  [] Not Met     POSTURE: Patient will correct postural deviations in sitting and standing, to decrease pain and promote long term stability.  [x] Progressing  [] Met  [] Not Met     GAIT: Patient will demonstrate improved gait mechanics in order to improve functional mobility, improve quality of life, and decrease risk of further injury or fall.  [x] Progressing  [] Met  [] Not Met     HEP: Patient will  demonstrate independence with HEP in order to progress toward functional independence. [x] Progressing  [] Met  [] Not Met        Long Term Goals:  12 weeks Status Date Met   PAIN: Pt will report worst pain of 3/10 in order to progress toward max functional ability and improve quality of life [x] Progressing  [] Met  [] Not Met     FUNCTION: Patient will demonstrate improved function as indicated by a functional limitation score of less than or equal to 42 out of 100 on FOTO. [x] Progressing  [] Met  [] Not Met     MOBILITY: Patient will improve AROM to stated goals, listed in objective measures above, in order to return to maximal functional potential and improve quality of life.  [x] Progressing  [] Met  [] Not Met     STRENGTH: Patient will improve strength to stated goals, listed in objective measures above, in order to improve functional independence and quality of life.  [x] Progressing  [] Met  [] Not Met     GAIT: Patient will demonstrate normalized gait mechanics with minimal compensation in order to return to PLOF. [x] Progressing  [] Met  [] Not Met     Patient will return to normal ADL's, IADL's, community involvement, recreational activities, and work-related activities with less than or equal to 2/10 pain and maximal function.  [x] Progressing  [] Met  [] Not Met           Plan     Continue Plan of Care (POC) and progress per patient tolerance. See treatment section for details on planned progressions next session.    6/15/2023 (evaluation): Outpatient Physical Therapy 2 times weekly for 12 weeks to include any combination of the following interventions: virtual visits, dry needling, modalities, electrical stimulation (IFC, Pre-Mod, Attended with Functional Dry Needling), Aquatic Therapy, Cervical/Lumbar Traction, Gait Training, Manual Therapy, Neuromuscular Re-ed, Patient Education, Self Care, Therapeutic Exercise, and Therapeutic Activites     Irma Garzon, PT

## 2023-06-22 NOTE — PROGRESS NOTES
[facility]  Occupational Therapy Treatment Note     Date: 6/21/2023  Name: Rodolfo Alvarez  Clinic Number: [id number]    Therapy Diagnosis:   Encounter Diagnoses   Name Primary?    Decreased range of motion of left shoulder Yes    Decreased strength     Decreased activities of daily living (ADL)     Pain      Physician: Nirmal Muro PA-C    PPhysician Orders: Occupational Therapy to Evaluate and Treat  Medical Diagnosis: S42.202D (ICD-10-CM) - Closed traumatic displaced fracture of proximal end of left humerus with routine healing, subsequent encounter  Surgical Procedure and Date: N/A     Evaluation Date: 6/15/2023  Insurance Authorization Period Expiration: 6/20/2023 - 12/31/2023  Plan of Care Certification Period: 6/15/2023 - 9/15/2023  Progress Note Due: 7/15/2023     Date of Return to MD: N/A  Visit # / Visits authorized: 1 / 20 (2 Episode Visits)  FOTO: 1/3    Precautions:  Standard    Time In: 2:45  Time Out: 3:30  Total Treatment Time: 45 minutes  Total Billable Time: 40 minutes    Subjective     Patient reports: He is sore from doing his overhead pulley at home.    he was compliant with home exercise program given last session.   Response to previous treatment: Tolerated well  Functional change: Improved knowledge of HEP    Pain: 4/10  Location: left shoulder    Objective     Objective measures updated at progress report unless specified.    Treatment     Supervised Modalities: Modalities such as hot/cold packs, traction, unattended electrical stimulation, paraffin bath, fluidotherapy to reduce pain and increase soft tissue extensibility. Rodolfo received (5) minutes of modalities listed below after being cleared for contraindications.  x = modalities performed     Supervised Modalities 6/21/2023    Hot pack x Left shoulder                      Manual Therapy Techniques: Joint mobilizations, Soft tissue Mobilization, and mobilization with movement applied to the area listed below. Rodolfo  received (15) minutes of interventions. x = intervention performed today    Manual Intervention 6/21/2023    Soft Tissue Mobilization x Upper trapezius, deltoid, bicep, tricep, supraspinatus   Joint Mobilizations x Grade 1 oscillations    Mobilization with movement              Therapeutic Exercises: to develop strength, endurance, ROM, flexibility, posture and core stabilization. Rodolfo received (15) minutes of exercises listed below. x = performed today * = level of progression of activity     Therapeutic Exercise 6/21/2023    Table top active assist range of motion  - shoulder flexion  -abduction  -circles  -external rotation  x 10x with 10 second holds at end range   Overhead pulley  -flexion  -abduction x 2 minutes each   Supine dowel exercises  Chest press  Shoulder flexion x 2 sets of 10             Therapeutic Activities: Everyday tasks to address the combined need of improved strength, range of motion, and endurance to achieve increased independence. While simulating these activities, the person is applying the gained skills of strength and improved range of motion in a practical way.  Rodolfo received (0) minutes of activities listed below. x = activities performed today * = level of progression of activity     Therapeutic Activities 6/21/2023                          Neuromuscular Re-education: the use of functional strengthening, stretching, balancing and coordination activities that train the nerves and muscles to react and communicate to restore normal body movement patterns to increase self care independence. Rodolfo received (10) minutes of interventions listed below.  x = interventions performed today * = level of progression of activity     Neuromuscular Re-education 6/21/2023    Pectoral stretch  -T (modified)  -Y(modified)  -W (modified) x 3x - 30 second holds   Wall stretch for bicep and pec x 3x - 30 second holds   Doorway stretch arm at 45 x 3x - 30 second holds          Self Care: Fundamental  skills required to independently care for oneself. Activities of daily living such as eating, bathing, dressing, toileting, grooming, sleeping, transfers and mobility. Instrumental activities of daily living such as driving, medication management, pet care, home management/cleaning, financial management, using the phone, meal preparation and shopping. Rodolfo received (0) minutes of interventions listed below.  x = interventions performed * = level of progression of activity     Self Care 6/21/2023                            Home Exercises and Education Provided     Education provided:   - progress towards goals     Written Home Exercises Provided: Patient instructed to cont prior HEP.  Exercises were reviewed and Rodolfo was able to demonstrate them prior to the end of the session. Rodolfo demonstrated fair understanding of the home exercise program provided.     See EMR under Patient Instructions for exercises provided  6/15/2023 .        Assessment     Patient tolerated exercises well. He needs verbal cues to     Rodolfo is progressing towards his goals and there are no updates to goals at this time. Patient prognosis is Good.     Patient will continue to benefit from skilled outpatient occupational therapy to address the deficits listed in the problem list on initial evaluation provide patient/family education and to maximize patient's level of independence in the home and community environment.     Patient's spiritual, cultural and educational needs considered and patient agreeable to plan of care and goals.    Anticipated barriers to occupational therapy: Pain, home exercise program compliance     Goals:     Short Term Goals:  6 weeks  Progress      Pain: Patient will demonstrate improved pain by reports of less than or equal to 4/10 worst pain on the verbal rating scale in order to progress toward maximal functional ability and improve quality of life. PC   Function: Patient will demonstrate improved function as  indicated by a functional limitation score of less than or equal to 33 out of 100 on FOTO. PC   Mobility: Patient will improve AROM to 50% of stated goals, listed in objective measures above, in order to progress towards independence with functional activities.  PC   Strength: Patient will improve strength to 50% of stated goals, listed in objective measures above, in order to progress towards independence with functional activities.  PC   HEP: Patient will demonstrate independence with HEP in order to progress toward functional independence. PC      Long Term Goals:  12 weeks  Progress     Pain: Patient will demonstrate improved pain by reports of less than or equal to 3/10 worst pain on the verbal rating scale in order to progress toward maximal functional ability and improve quality of life.   PC   Function: Patient will demonstrate improved function as indicated by a functional limitation score of less than or equal to 32 out of 100 on FOTO. PC   Mobility: Patient will improve AROM to stated goals, listed in objective measures above, in order to return to maximal functional potential and improve quality of life. PC   Strength: Patient will improve strength to stated goals, listed in objective measures above, in order to improve functional independence and quality of life. PC   Patient will return to normal ADL's, IADL's, community involvement, recreational activities, and work-related activities with less than or equal to 1/10 pain and maximal function.  PC      Goals Key:  PC= Progressing/Continue;       PM= Partially Met;       GM= Goal Met      DC= Discontinue       Plan     Continue Plan of Care (POC) and progress per patient tolerance.      Estrellita Palma, OT  ,OTD, OTR/L

## 2023-06-23 ENCOUNTER — CLINICAL SUPPORT (OUTPATIENT)
Dept: REHABILITATION | Facility: HOSPITAL | Age: 73
End: 2023-06-23
Payer: MEDICARE

## 2023-06-23 DIAGNOSIS — M25.612 DECREASED RANGE OF MOTION OF LEFT SHOULDER: Primary | ICD-10-CM

## 2023-06-23 DIAGNOSIS — R52 PAIN: ICD-10-CM

## 2023-06-23 DIAGNOSIS — R53.1 DECREASED STRENGTH: ICD-10-CM

## 2023-06-23 DIAGNOSIS — Z78.9 DECREASED ACTIVITIES OF DAILY LIVING (ADL): ICD-10-CM

## 2023-06-23 PROCEDURE — 97110 THERAPEUTIC EXERCISES: CPT

## 2023-06-23 PROCEDURE — 97140 MANUAL THERAPY 1/> REGIONS: CPT

## 2023-06-23 PROCEDURE — 97112 NEUROMUSCULAR REEDUCATION: CPT

## 2023-06-23 NOTE — PROGRESS NOTES
[facility]  Occupational Therapy Treatment Note     Date: 6/23/2023  Name: Rodolfo Alvarez  Clinic Number: [id number]    Therapy Diagnosis:   Encounter Diagnoses   Name Primary?    Decreased range of motion of left shoulder Yes    Decreased strength     Decreased activities of daily living (ADL)     Pain        Physician: Nirmal Muro, YASIR    PPhysician Orders: Occupational Therapy to Evaluate and Treat  Medical Diagnosis: S42.202D (ICD-10-CM) - Closed traumatic displaced fracture of proximal end of left humerus with routine healing, subsequent encounter  Surgical Procedure and Date: N/A     Evaluation Date: 6/15/2023  Insurance Authorization Period Expiration: 6/20/2023 - 12/31/2023  Plan of Care Certification Period: 6/15/2023 - 9/15/2023  Progress Note Due: 7/15/2023     Date of Return to MD: N/A  Visit # / Visits authorized: 2 / 20 (3 Episode Visits)  FOTO: 1/3    Precautions:  Standard    Time In: 1:05  Time Out: 1:50  Total Treatment Time: 45 minutes  Total Billable Time: 40 minutes    Subjective     Patient reports: He is doing well. He has noticed a slight improvement in pain     he was compliant with home exercise program given last session.   Response to previous treatment: Tolerated well  Functional change: Improved knowledge of HEP    Pain: 4/10  Location: left shoulder    Objective     Objective measures updated at progress report unless specified.    Treatment     Supervised Modalities: Modalities such as hot/cold packs, traction, unattended electrical stimulation, paraffin bath, fluidotherapy to reduce pain and increase soft tissue extensibility. Rodolfo received (5) minutes of modalities listed below after being cleared for contraindications.  x = modalities performed     Supervised Modalities 6/23/2023    Hot pack x Left shoulder                      Manual Therapy Techniques: Joint mobilizations, Soft tissue Mobilization, and mobilization with movement applied to the area listed  below. Rodolfo received (15) minutes of interventions. x = intervention performed today    Manual Intervention 6/23/2023    Soft Tissue Mobilization x Upper trapezius, deltoid, bicep, tricep, supraspinatus   Joint Mobilizations x Grade 1 oscillations    Mobilization with movement              Therapeutic Exercises: to develop strength, endurance, ROM, flexibility, posture and core stabilization. Rodolfo received (15) minutes of exercises listed below. x = performed today * = level of progression of activity     Therapeutic Exercise 6/23/2023    Table top active assist range of motion  - shoulder flexion  -abduction  -circles  -external rotation  x 10x with 10 second holds at end range   Overhead pulley  -flexion  -abduction x 2 minutes each   Supine dowel exercises  Chest press  Shoulder flexion x 2 sets of 10   Side lying internal rotation and external rotation  x 2 sets of 10   Sitting dowel shoulder flexion x 2 sets of 10        Therapeutic Activities: Everyday tasks to address the combined need of improved strength, range of motion, and endurance to achieve increased independence. While simulating these activities, the person is applying the gained skills of strength and improved range of motion in a practical way.  Rodolfo received (0) minutes of activities listed below. x = activities performed today * = level of progression of activity     Therapeutic Activities 6/23/2023                          Neuromuscular Re-education: the use of functional strengthening, stretching, balancing and coordination activities that train the nerves and muscles to react and communicate to restore normal body movement patterns to increase self care independence. Rodolfo received (10) minutes of interventions listed below.  x = interventions performed today * = level of progression of activity     Neuromuscular Re-education 6/23/2023    Pectoral stretch  -T (modified)  -Y(modified)  -W (modified) x 3x - 30 second holds   Wall stretch  for bicep and pec x 3x - 30 second holds   Doorway stretch arm at 45 x 3x - 30 second holds          Self Care: Fundamental skills required to independently care for oneself. Activities of daily living such as eating, bathing, dressing, toileting, grooming, sleeping, transfers and mobility. Instrumental activities of daily living such as driving, medication management, pet care, home management/cleaning, financial management, using the phone, meal preparation and shopping. Rodolfo received (0) minutes of interventions listed below.  x = interventions performed * = level of progression of activity     Self Care 6/23/2023                            Home Exercises and Education Provided     Education provided:   - progress towards goals     Written Home Exercises Provided: Patient instructed to cont prior HEP.  Exercises were reviewed and Rodolfo was able to demonstrate them prior to the end of the session. Rodolfo demonstrated fair understanding of the home exercise program provided.     See EMR under Patient Instructions for exercises provided  6/15/2023 .        Assessment     Patient tolerated exercises well. He needs verbal cues to     Rodolfo is progressing towards his goals and there are no updates to goals at this time. Patient prognosis is Good.     Patient will continue to benefit from skilled outpatient occupational therapy to address the deficits listed in the problem list on initial evaluation provide patient/family education and to maximize patient's level of independence in the home and community environment.     Patient's spiritual, cultural and educational needs considered and patient agreeable to plan of care and goals.    Anticipated barriers to occupational therapy: Pain, home exercise program compliance     Goals:     Short Term Goals:  6 weeks  Progress      Pain: Patient will demonstrate improved pain by reports of less than or equal to 4/10 worst pain on the verbal rating scale in order to progress  toward maximal functional ability and improve quality of life. PC   Function: Patient will demonstrate improved function as indicated by a functional limitation score of less than or equal to 33 out of 100 on FOTO. PC   Mobility: Patient will improve AROM to 50% of stated goals, listed in objective measures above, in order to progress towards independence with functional activities.  PC   Strength: Patient will improve strength to 50% of stated goals, listed in objective measures above, in order to progress towards independence with functional activities.  PC   HEP: Patient will demonstrate independence with HEP in order to progress toward functional independence. PC      Long Term Goals:  12 weeks  Progress     Pain: Patient will demonstrate improved pain by reports of less than or equal to 3/10 worst pain on the verbal rating scale in order to progress toward maximal functional ability and improve quality of life.   PC   Function: Patient will demonstrate improved function as indicated by a functional limitation score of less than or equal to 32 out of 100 on FOTO. PC   Mobility: Patient will improve AROM to stated goals, listed in objective measures above, in order to return to maximal functional potential and improve quality of life. PC   Strength: Patient will improve strength to stated goals, listed in objective measures above, in order to improve functional independence and quality of life. PC   Patient will return to normal ADL's, IADL's, community involvement, recreational activities, and work-related activities with less than or equal to 1/10 pain and maximal function.  PC      Goals Key:  PC= Progressing/Continue;       PM= Partially Met;       GM= Goal Met      DC= Discontinue       Plan     Continue Plan of Care (POC) and progress per patient tolerance.      Estrellita Palma, OT  ,OTD, OTR/L

## 2023-06-27 ENCOUNTER — CLINICAL SUPPORT (OUTPATIENT)
Dept: REHABILITATION | Facility: HOSPITAL | Age: 73
End: 2023-06-27
Payer: MEDICARE

## 2023-06-27 DIAGNOSIS — M25.561 CHRONIC PAIN OF BOTH KNEES: ICD-10-CM

## 2023-06-27 DIAGNOSIS — Z78.9 DECREASED ACTIVITIES OF DAILY LIVING (ADL): ICD-10-CM

## 2023-06-27 DIAGNOSIS — M25.662 DECREASED RANGE OF MOTION OF BOTH KNEES: ICD-10-CM

## 2023-06-27 DIAGNOSIS — Z74.09 DECREASED STRENGTH, ENDURANCE, AND MOBILITY: ICD-10-CM

## 2023-06-27 DIAGNOSIS — R68.89 DECREASED STRENGTH, ENDURANCE, AND MOBILITY: ICD-10-CM

## 2023-06-27 DIAGNOSIS — M25.661 DECREASED RANGE OF MOTION OF BOTH KNEES: ICD-10-CM

## 2023-06-27 DIAGNOSIS — R52 PAIN: ICD-10-CM

## 2023-06-27 DIAGNOSIS — M25.612 DECREASED RANGE OF MOTION OF LEFT SHOULDER: Primary | ICD-10-CM

## 2023-06-27 DIAGNOSIS — M17.0 PRIMARY OSTEOARTHRITIS OF BOTH KNEES: Primary | ICD-10-CM

## 2023-06-27 DIAGNOSIS — M25.562 CHRONIC PAIN OF BOTH KNEES: ICD-10-CM

## 2023-06-27 DIAGNOSIS — G89.29 CHRONIC PAIN OF BOTH KNEES: ICD-10-CM

## 2023-06-27 DIAGNOSIS — R53.1 DECREASED STRENGTH, ENDURANCE, AND MOBILITY: ICD-10-CM

## 2023-06-27 DIAGNOSIS — R53.1 DECREASED STRENGTH: ICD-10-CM

## 2023-06-27 PROCEDURE — 97530 THERAPEUTIC ACTIVITIES: CPT | Performed by: PHYSICAL THERAPIST

## 2023-06-27 PROCEDURE — 97110 THERAPEUTIC EXERCISES: CPT

## 2023-06-27 PROCEDURE — 97140 MANUAL THERAPY 1/> REGIONS: CPT

## 2023-06-27 PROCEDURE — 97112 NEUROMUSCULAR REEDUCATION: CPT | Performed by: PHYSICAL THERAPIST

## 2023-06-27 PROCEDURE — 97112 NEUROMUSCULAR REEDUCATION: CPT

## 2023-06-27 NOTE — PROGRESS NOTES
[facility]  Occupational Therapy Treatment Note     Date: 6/27/2023  Name: Rodolfo Alvarez  Clinic Number: [id number]    Therapy Diagnosis:   Encounter Diagnoses   Name Primary?    Decreased range of motion of left shoulder Yes    Decreased strength     Decreased activities of daily living (ADL)     Pain        Physician: Nirmal Muro, YASIR    PPhysician Orders: Occupational Therapy to Evaluate and Treat  Medical Diagnosis: S42.202D (ICD-10-CM) - Closed traumatic displaced fracture of proximal end of left humerus with routine healing, subsequent encounter  Surgical Procedure and Date: N/A     Evaluation Date: 6/15/2023  Insurance Authorization Period Expiration: 6/20/2023 - 12/31/2023  Plan of Care Certification Period: 6/15/2023 - 9/15/2023  Progress Note Due: 7/15/2023     Date of Return to MD: N/A  Visit # / Visits authorized: 3 / 20 (4 Episode Visits)  FOTO: 1/3    Precautions:  Standard    Time In: 2:30   Time Out: 3:15  Total Treatment Time: 45 minutes  Total Billable Time: 40 minutes    Subjective     Patient reports: He is doing well. He has noticed an improvement with range of motion     he was compliant with home exercise program given last session.   Response to previous treatment: Tolerated well  Functional change: Improved knowledge of HEP    Pain: 4/10  Location: left shoulder    Objective     Objective measures updated at progress report unless specified.    Treatment     Supervised Modalities: Modalities such as hot/cold packs, traction, unattended electrical stimulation, paraffin bath, fluidotherapy to reduce pain and increase soft tissue extensibility. Rodolfo received (5) minutes of modalities listed below after being cleared for contraindications.  x = modalities performed     Supervised Modalities 6/27/2023    Hot pack x Left shoulder                      Manual Therapy Techniques: Joint mobilizations, Soft tissue Mobilization, and mobilization with movement applied to the area  listed below. Rodolfo received (15) minutes of interventions. x = intervention performed today    Manual Intervention 6/27/2023    Soft Tissue Mobilization x Upper trapezius, deltoid, bicep, tricep, supraspinatus   Joint Mobilizations x Grade 1 oscillations    Mobilization with movement              Therapeutic Exercises: to develop strength, endurance, ROM, flexibility, posture and core stabilization. Rodolfo received (15) minutes of exercises listed below. x = performed today * = level of progression of activity     Therapeutic Exercise 6/27/2023    Table top active assist range of motion  - shoulder flexion  -abduction  -circles  -external rotation  x 10x with 10 second holds at end range   Overhead pulley  -flexion  -abduction x 2 minutes each   Supine dowel exercises  Chest press  Shoulder flexion x 2 sets of 10   Side lying internal rotation and external rotation  x 2 sets of 10   Sitting dowel shoulder flexion x 2 sets of 10        Therapeutic Activities: Everyday tasks to address the combined need of improved strength, range of motion, and endurance to achieve increased independence. While simulating these activities, the person is applying the gained skills of strength and improved range of motion in a practical way.  Rodolfo received (0) minutes of activities listed below. x = activities performed today * = level of progression of activity     Therapeutic Activities 6/27/2023                          Neuromuscular Re-education: the use of functional strengthening, stretching, balancing and coordination activities that train the nerves and muscles to react and communicate to restore normal body movement patterns to increase self care independence. Rodolfo received (10) minutes of interventions listed below.  x = interventions performed today * = level of progression of activity     Neuromuscular Re-education 6/27/2023    Pectoral stretch  -T (modified)  -Y(modified)  -W (modified) x 3x - 30 second holds   Wall  stretch for bicep and pec x 3x - 30 second holds   Doorway stretch arm at 45 x 3x - 30 second holds   Scapular retraction blue band x 2 sets of 10   Scapular depression blue band x 2 sets of 10     Self Care: Fundamental skills required to independently care for oneself. Activities of daily living such as eating, bathing, dressing, toileting, grooming, sleeping, transfers and mobility. Instrumental activities of daily living such as driving, medication management, pet care, home management/cleaning, financial management, using the phone, meal preparation and shopping. Rodolfo received (0) minutes of interventions listed below.  x = interventions performed * = level of progression of activity     Self Care 6/27/2023                            Home Exercises and Education Provided     Education provided:   - progress towards goals     Written Home Exercises Provided: Patient instructed to cont prior HEP.  Exercises were reviewed and Rodolfo was able to demonstrate them prior to the end of the session. Rodolfo demonstrated fair understanding of the home exercise program provided.     See EMR under Patient Instructions for exercises provided  6/15/2023 .        Assessment     Patient tolerated exercises well. He needs verbal cues to     Rodolfo is progressing towards his goals and there are no updates to goals at this time. Patient prognosis is Good.     Patient will continue to benefit from skilled outpatient occupational therapy to address the deficits listed in the problem list on initial evaluation provide patient/family education and to maximize patient's level of independence in the home and community environment.     Patient's spiritual, cultural and educational needs considered and patient agreeable to plan of care and goals.    Anticipated barriers to occupational therapy: Pain, home exercise program compliance     Goals:     Short Term Goals:  6 weeks  Progress      Pain: Patient will demonstrate improved pain by  reports of less than or equal to 4/10 worst pain on the verbal rating scale in order to progress toward maximal functional ability and improve quality of life. PC   Function: Patient will demonstrate improved function as indicated by a functional limitation score of less than or equal to 33 out of 100 on FOTO. PC   Mobility: Patient will improve AROM to 50% of stated goals, listed in objective measures above, in order to progress towards independence with functional activities.  PC   Strength: Patient will improve strength to 50% of stated goals, listed in objective measures above, in order to progress towards independence with functional activities.  PC   HEP: Patient will demonstrate independence with HEP in order to progress toward functional independence. PC      Long Term Goals:  12 weeks  Progress     Pain: Patient will demonstrate improved pain by reports of less than or equal to 3/10 worst pain on the verbal rating scale in order to progress toward maximal functional ability and improve quality of life.   PC   Function: Patient will demonstrate improved function as indicated by a functional limitation score of less than or equal to 32 out of 100 on FOTO. PC   Mobility: Patient will improve AROM to stated goals, listed in objective measures above, in order to return to maximal functional potential and improve quality of life. PC   Strength: Patient will improve strength to stated goals, listed in objective measures above, in order to improve functional independence and quality of life. PC   Patient will return to normal ADL's, IADL's, community involvement, recreational activities, and work-related activities with less than or equal to 1/10 pain and maximal function.  PC      Goals Key:  PC= Progressing/Continue;       PM= Partially Met;       GM= Goal Met      DC= Discontinue       Plan     Continue Plan of Care (POC) and progress per patient tolerance.      Estrellita Palma, OT  ,OTD, OTR/L

## 2023-06-27 NOTE — PROGRESS NOTES
"[facility]   Physical Therapy Treatment Note        Name: Rodolfo Alvarez  Clinic Number: [id number]    Therapy Diagnosis:   Encounter Diagnoses   Name Primary?    Primary osteoarthritis of both knees Yes    Chronic pain of both knees     Decreased range of motion of both knees     Decreased strength, endurance, and mobility      Physician: Katelynn Azul PA    Visit Date: 6/27/2023    Physician Orders: PT Eval and Treat  Medical Diagnosis from Referral: primary osteoarthritis of both knees  Evaluation Date: 6/15/2023  Authorization Period Expiration: 12/3/2023  Plan of Care Expiration: 9/13/2023  Progress Note Due: 7/15/2023  Visit # / Visits authorized: 2/20 (+eval)   FOTO: 1/3 (last performed on 6/15/2023)     Precautions: Standard and recent humerus fracture    PTA Visit #: 0/5     Time In: 1522  Time Out: 1605  Total Billable Time: 43 minutes (Billing reflects 1 on 1 treatment time spent with patient)    Subjective     Patient reports: that he is feeling pretty good today. He did well following last visit.    He/She was compliant with home exercise program.  Response to previous treatment: no adverse reaction   Functional change: none noted yet    Pain: 0/10  (left knee today)  Location: bilateral knee pain, L>R    Objective      Objective Measures updated at progress report or POC update only unless otherwise noted.       Treatment     Rodolfo received the treatments listed below:       THERAPEUTIC EXERCISES to develop strength, endurance, ROM, flexibility, posture, and core stabilization for (3) minutes including:    Intervention Performed Today    Nustep for ROM and strength  6' level 3   Ball squeezes  x 3', 3" holds on and off                                   Plan for Next Visit:          NEUROMUSCULAR RE-EDUCATION ACTIVITIES to improve Balance, Coordination, Kinesthetic, Sense, Proprioception, and Posture for (25) minutes.  The following were included:    Intervention " "Performed Today    Quad sets x 3', 5" holds B LE   SAQ (Progress weight next visit) x 3', B LE, 2#   Straight leg raises x 2 x 10, each LE   Sidelying clams x 2 x 10 each LE   LAQ x 3', B LE                    Plan for Next Visit:          THERAPEUTIC ACTIVITIES to improve dynamic and functional  performance for (15) minutes including:    Intervention Performed Today    Bridges x 2 x 10   Sit to Stands x 2 x 10 (hi-low mat), higher surface without use of UE of assistance    Standing TKE x 3 x 10 each LE, pink band   Step ups (added) x 1 x 10 each foot, 4" step                         Plan for Next Visit:           Patient Education and Home Exercises       Home Exercises Provided and Patient Education Provided     Education provided:   PURPOSE: Patient educated on the impairments noted above and the effects of physical therapy intervention to improve overall condition and QOL.   EXERCISE: Patient was educated on all the above exercise prior/during/after for proper posture, positioning, and execution for safe performance with home exercise program.   STRENGTH: Patient educated on the importance of improved core and extremity strength in order to improve alignment of the spine and extremities with static positions and dynamic movement.   GAIT & BALANCE: Patient educated on the importance of strong core and lower extremity musculature in order to improve both static and dynamic balance, improve gait mechanics, reduce fall risk and improve household and community mobility.   POSTURE: Patient educated on postural awareness to reduce stress and maintain optimal alignment of the spine with static positions and dynamic movement     Written Home Exercises Provided: yes.  Exercises were reviewed and Rodolfo was able to demonstrate them prior to the end of the session.  Rodolfo demonstrated good  understanding of the education provided. See EMR under Patient Instructions for exercises provided during therapy " sessions.    Assessment     Patient tolerated treatment well again today. He was able to be progressed to additional standing activity without issue. Patient would benefit from continued strengthening, but he is already demonstrating improving quadriceps activation as compared to previous visits. Poor core strength noted with exercises and bed mobility transitions; therefore he would benefit from additional core strengthening activities as well as LE strengthening in upcoming visits.     Rodolfo is progressing well towards his goals.   Patient prognosis is Fair.     Patient will continue to benefit from skilled outpatient physical therapy to address the deficits listed in the problem list box on initial evaluation, provide pt/family education and to maximize patient's level of independence in the home and community environment.     Patient's spiritual, cultural and educational needs considered and pt agreeable to plan of care and goals.     Anticipated Barriers for therapy: co-morbidities, sedentary lifestyle, chronicity of condition, and adherence to treatment plan    Goal:  Short Term Goals:  6 weeks Status  Date Met   PAIN: Pt will report worst pain of 5/10 in order to progress toward max functional ability and improve quality of life. [x] Progressing  [] Met  [] Not Met     FUNCTION: Patient will demonstrate improved function as indicated by a functional limitation score of less than or equal to 48 out of 100 on FOTO. [x] Progressing  [] Met  [] Not Met     STRENGTH: Patient will improve strength to 50% of stated goals, listed in objective measures above, in order to progress towards independence with functional activities. [x] Progressing  [] Met  [] Not Met     POSTURE: Patient will correct postural deviations in sitting and standing, to decrease pain and promote long term stability.  [x] Progressing  [] Met  [] Not Met     GAIT: Patient will demonstrate improved gait mechanics in order to improve functional  mobility, improve quality of life, and decrease risk of further injury or fall.  [x] Progressing  [] Met  [] Not Met     HEP: Patient will demonstrate independence with HEP in order to progress toward functional independence. [x] Progressing  [] Met  [] Not Met        Long Term Goals:  12 weeks Status Date Met   PAIN: Pt will report worst pain of 3/10 in order to progress toward max functional ability and improve quality of life [x] Progressing  [] Met  [] Not Met     FUNCTION: Patient will demonstrate improved function as indicated by a functional limitation score of less than or equal to 42 out of 100 on FOTO. [x] Progressing  [] Met  [] Not Met     MOBILITY: Patient will improve AROM to stated goals, listed in objective measures above, in order to return to maximal functional potential and improve quality of life.  [x] Progressing  [] Met  [] Not Met     STRENGTH: Patient will improve strength to stated goals, listed in objective measures above, in order to improve functional independence and quality of life.  [x] Progressing  [] Met  [] Not Met     GAIT: Patient will demonstrate normalized gait mechanics with minimal compensation in order to return to PLOF. [x] Progressing  [] Met  [] Not Met     Patient will return to normal ADL's, IADL's, community involvement, recreational activities, and work-related activities with less than or equal to 2/10 pain and maximal function.  [x] Progressing  [] Met  [] Not Met           Plan     Continue Plan of Care (POC) and progress per patient tolerance. See treatment section for details on planned progressions next session.    6/15/2023 (evaluation): Outpatient Physical Therapy 2 times weekly for 12 weeks to include any combination of the following interventions: virtual visits, dry needling, modalities, electrical stimulation (IFC, Pre-Mod, Attended with Functional Dry Needling), Aquatic Therapy, Cervical/Lumbar Traction, Gait Training, Manual Therapy, Neuromuscular Re-ed,  Patient Education, Self Care, Therapeutic Exercise, and Therapeutic Activites     Irma Garzon, PT

## 2023-06-30 ENCOUNTER — CLINICAL SUPPORT (OUTPATIENT)
Dept: REHABILITATION | Facility: HOSPITAL | Age: 73
End: 2023-06-30
Payer: MEDICARE

## 2023-06-30 DIAGNOSIS — M17.0 PRIMARY OSTEOARTHRITIS OF BOTH KNEES: Primary | ICD-10-CM

## 2023-06-30 DIAGNOSIS — G89.29 CHRONIC PAIN OF BOTH KNEES: ICD-10-CM

## 2023-06-30 DIAGNOSIS — M25.561 CHRONIC PAIN OF BOTH KNEES: ICD-10-CM

## 2023-06-30 DIAGNOSIS — R68.89 DECREASED STRENGTH, ENDURANCE, AND MOBILITY: ICD-10-CM

## 2023-06-30 DIAGNOSIS — M25.562 CHRONIC PAIN OF BOTH KNEES: ICD-10-CM

## 2023-06-30 DIAGNOSIS — R53.1 DECREASED STRENGTH, ENDURANCE, AND MOBILITY: ICD-10-CM

## 2023-06-30 DIAGNOSIS — Z74.09 DECREASED STRENGTH, ENDURANCE, AND MOBILITY: ICD-10-CM

## 2023-06-30 DIAGNOSIS — M25.662 DECREASED RANGE OF MOTION OF BOTH KNEES: ICD-10-CM

## 2023-06-30 DIAGNOSIS — M25.661 DECREASED RANGE OF MOTION OF BOTH KNEES: ICD-10-CM

## 2023-06-30 PROCEDURE — 97530 THERAPEUTIC ACTIVITIES: CPT | Performed by: PHYSICAL THERAPIST

## 2023-06-30 PROCEDURE — 97112 NEUROMUSCULAR REEDUCATION: CPT | Performed by: PHYSICAL THERAPIST

## 2023-06-30 PROCEDURE — 97110 THERAPEUTIC EXERCISES: CPT | Performed by: PHYSICAL THERAPIST

## 2023-06-30 NOTE — PROGRESS NOTES
"[facility]   Physical Therapy Treatment Note        Name: Rodolfo Alvarez  Clinic Number: [id number]    Therapy Diagnosis:   Encounter Diagnoses   Name Primary?    Primary osteoarthritis of both knees Yes    Chronic pain of both knees     Decreased range of motion of both knees     Decreased strength, endurance, and mobility      Physician: Katelynn Azul PA    Visit Date: 6/30/2023    Physician Orders: PT Eval and Treat  Medical Diagnosis from Referral: primary osteoarthritis of both knees  Evaluation Date: 6/15/2023  Authorization Period Expiration: 12/3/2023  Plan of Care Expiration: 9/13/2023  Progress Note Due: 7/15/2023  Visit # / Visits authorized: 3/20 (+eval)   FOTO: 1/3 (last performed on 6/15/2023)     Precautions: Standard and recent humerus fracture    PTA Visit #: 0/5     Time In: 1432  Time Out: 1518  Total Billable Time: 44 minutes (Billing reflects 1 on 1 treatment time spent with patient)    Subjective     Patient reports: that he is feeling pretty good again today with no complaints of pain.     He/She was compliant with home exercise program.  Response to previous treatment: no adverse reaction   Functional change: none noted yet    Pain: 0/10  (left knee today)  Location: bilateral knee pain, L>R    Objective      Objective Measures updated at progress report or POC update only unless otherwise noted.       Treatment     Rodolfo received the treatments listed below:       THERAPEUTIC EXERCISES to develop strength, endurance, ROM, flexibility, posture, and core stabilization for (10) minutes including:    Intervention Performed Today    Nustep for ROM and strength x 7' level 3   Ball squeezes  x 3', 3" holds on and off                                   Plan for Next Visit:          NEUROMUSCULAR RE-EDUCATION ACTIVITIES to improve Balance, Coordination, Kinesthetic, Sense, Proprioception, and Posture for (21) minutes.  The following were included:    Intervention " "Performed Today    Quad sets x 3', 5" holds B LE   SAQ (Progress weight next visit) x 3', B LE, 2#   Straight leg raises x 3 x 10, each LE   Sidelying clams x 2 x 10 each LE   LAQ x 3', B LE                    Plan for Next Visit:          THERAPEUTIC ACTIVITIES to improve dynamic and functional  performance for (13) minutes including:    Intervention Performed Today    Bridges x 2 x 10   Sit to Stands x 2 x 10 (hi-low mat), higher surface without use of UE of assistance    Standing TKE x 3 x 10 each LE, pink band   Step ups  x 1 x 10 each foot, 4" step                         Plan for Next Visit:           Patient Education and Home Exercises       Home Exercises Provided and Patient Education Provided     Education provided:   PURPOSE: Patient educated on the impairments noted above and the effects of physical therapy intervention to improve overall condition and QOL.   EXERCISE: Patient was educated on all the above exercise prior/during/after for proper posture, positioning, and execution for safe performance with home exercise program.   STRENGTH: Patient educated on the importance of improved core and extremity strength in order to improve alignment of the spine and extremities with static positions and dynamic movement.   GAIT & BALANCE: Patient educated on the importance of strong core and lower extremity musculature in order to improve both static and dynamic balance, improve gait mechanics, reduce fall risk and improve household and community mobility.   POSTURE: Patient educated on postural awareness to reduce stress and maintain optimal alignment of the spine with static positions and dynamic movement     Written Home Exercises Provided: yes.  Exercises were reviewed and Rodolfo was able to demonstrate them prior to the end of the session.  Rodolfo demonstrated good  understanding of the education provided. See EMR under Patient Instructions for exercises provided during therapy sessions.    Assessment "     Patient did well with treatment today. He completed exercises with less difficulty and without increased complaint. Improving LE strength as noted throughout exercises and progressions. However, patient does still require cues for proper form with activities, and he would benefit from continued core strengthening.     Rodolfo is progressing well towards his goals.   Patient prognosis is Fair.     Patient will continue to benefit from skilled outpatient physical therapy to address the deficits listed in the problem list box on initial evaluation, provide pt/family education and to maximize patient's level of independence in the home and community environment.     Patient's spiritual, cultural and educational needs considered and pt agreeable to plan of care and goals.     Anticipated Barriers for therapy: co-morbidities, sedentary lifestyle, chronicity of condition, and adherence to treatment plan    Goal:  Short Term Goals:  6 weeks Status  Date Met   PAIN: Pt will report worst pain of 5/10 in order to progress toward max functional ability and improve quality of life. [x] Progressing  [] Met  [] Not Met     FUNCTION: Patient will demonstrate improved function as indicated by a functional limitation score of less than or equal to 48 out of 100 on FOTO. [x] Progressing  [] Met  [] Not Met     STRENGTH: Patient will improve strength to 50% of stated goals, listed in objective measures above, in order to progress towards independence with functional activities. [x] Progressing  [] Met  [] Not Met     POSTURE: Patient will correct postural deviations in sitting and standing, to decrease pain and promote long term stability.  [x] Progressing  [] Met  [] Not Met     GAIT: Patient will demonstrate improved gait mechanics in order to improve functional mobility, improve quality of life, and decrease risk of further injury or fall.  [x] Progressing  [] Met  [] Not Met     HEP: Patient will demonstrate independence with  HEP in order to progress toward functional independence. [x] Progressing  [] Met  [] Not Met        Long Term Goals:  12 weeks Status Date Met   PAIN: Pt will report worst pain of 3/10 in order to progress toward max functional ability and improve quality of life [x] Progressing  [] Met  [] Not Met     FUNCTION: Patient will demonstrate improved function as indicated by a functional limitation score of less than or equal to 42 out of 100 on FOTO. [x] Progressing  [] Met  [] Not Met     MOBILITY: Patient will improve AROM to stated goals, listed in objective measures above, in order to return to maximal functional potential and improve quality of life.  [x] Progressing  [] Met  [] Not Met     STRENGTH: Patient will improve strength to stated goals, listed in objective measures above, in order to improve functional independence and quality of life.  [x] Progressing  [] Met  [] Not Met     GAIT: Patient will demonstrate normalized gait mechanics with minimal compensation in order to return to PLOF. [x] Progressing  [] Met  [] Not Met     Patient will return to normal ADL's, IADL's, community involvement, recreational activities, and work-related activities with less than or equal to 2/10 pain and maximal function.  [x] Progressing  [] Met  [] Not Met           Plan     Continue Plan of Care (POC) and progress per patient tolerance. See treatment section for details on planned progressions next session.    6/15/2023 (evaluation): Outpatient Physical Therapy 2 times weekly for 12 weeks to include any combination of the following interventions: virtual visits, dry needling, modalities, electrical stimulation (IFC, Pre-Mod, Attended with Functional Dry Needling), Aquatic Therapy, Cervical/Lumbar Traction, Gait Training, Manual Therapy, Neuromuscular Re-ed, Patient Education, Self Care, Therapeutic Exercise, and Therapeutic Activites     Irma Garzon, PT

## 2023-07-01 NOTE — PROGRESS NOTES
Subjective:      Patient ID: Rodolfo Alvarez is a 72 y.o. male.    Chief Complaint: Pain of the Left Upper Arm      HPI: Rodolfo Alvarez is a 72-year-old male in clinic today for follow-up of left proximal humerus fracture.  This injury occurred 2 months ago when the patient suffered a fall.  He was last seen in this clinic about 3 weeks ago.  Patient is being treated non operatively.  He is receiving home health physical therapy.  He reports that this pain is greatly improved and his ROM is progressing with therapy.  He has no new complaints at this time.    Past Medical History:   Diagnosis Date    HLD (hyperlipidemia)     Hypertension        Current Outpatient Medications:     acetaminophen (TYLENOL) 325 MG tablet, Take 325 mg by mouth every 6 (six) hours as needed for Pain., Disp: , Rfl:     amLODIPine (NORVASC) 10 MG tablet, Take 10 mg by mouth., Disp: , Rfl:     ascorbic acid (VITAMIN C ORAL), Take by mouth once daily., Disp: , Rfl:     aspirin (ECOTRIN) 81 MG EC tablet, Take 81 mg by mouth once daily., Disp: , Rfl:     atorvastatin (LIPITOR) 20 MG tablet, Take 1 tablet by mouth once daily., Disp: , Rfl:     CAFFEINE ORAL, Take by mouth daily as needed., Disp: , Rfl:     docosahexaenoic acid/epa (FISH OIL ORAL), Take by mouth once daily., Disp: , Rfl:     docusate sodium (STOOL SOFTENER ORAL), Take by mouth daily as needed., Disp: , Rfl:     ibuprofen (ADVIL,MOTRIN) 200 MG tablet, Take 200 mg by mouth every 6 (six) hours as needed for Pain., Disp: , Rfl:     lisinopriL (PRINIVIL,ZESTRIL) 40 MG tablet, Take 40 mg by mouth., Disp: , Rfl:     LORATADINE ORAL, Take by mouth daily as needed., Disp: , Rfl:     melatonin 5 mg Cap, Take by mouth nightly as needed., Disp: , Rfl:     multivit-min/folic/vit K/lycop (MEN'S 50 PLUS MULTIVITAMIN ORAL), Take by mouth once daily., Disp: , Rfl:     mv-mn/iron/folic acid/herb 190 (VITAMIN D3 COMPLETE ORAL), Take by mouth once daily., Disp: , Rfl:     psyllium seed, with sugar,  "(FIBER ORAL), Take by mouth daily as needed., Disp: , Rfl:     tamsulosin (FLOMAX) 0.4 mg Cap, TAKE 1 CAPSULE BY MOUTH AT BEDTIME  DAYS., Disp: , Rfl:     traZODone (DESYREL) 50 MG tablet, Take 50 mg by mouth every evening., Disp: , Rfl:     atorvastatin (LIPITOR) 20 MG tablet, Take 20 mg by mouth., Disp: , Rfl:     celecoxib (CELEBREX) 200 MG capsule, Take 1 capsule (200 mg total) by mouth once daily., Disp: 30 capsule, Rfl: 2    diclofenac sodium (VOLTAREN) 1 % Gel, Apply 2 g topically 3 (three) times daily., Disp: 200 g, Rfl: 2    HYDROcodone-acetaminophen (NORCO) 5-325 mg per tablet, Take 1 tablet by mouth every 8 (eight) hours as needed for Pain., Disp: 15 tablet, Rfl: 0  Review of patient's allergies indicates:  No Known Allergies    /72 (BP Location: Right arm, Patient Position: Sitting, BP Method: Medium (Automatic))   Pulse 64   Temp 98.2 °F (36.8 °C)   Ht 5' 6" (1.676 m)   Wt 102.5 kg (225 lb 15.5 oz)   BMI 36.47 kg/m²     ROS      Objective:    Ortho Exam   Left shoulder:  Skin is intact  Minimal TTP   Mild edema   Passive shoulder ROM is full, but active ROM is decreased secondary to weakness  Elbow, wrist, and fingers ROM full  Compartments are soft and compressible  Motor exam normal   Sensation pulses intact  Cap refill brisk    GEN: Well developed, well nourished male. AAOX3. No acute distress.   Head: Normocephalic, atraumatic.   Eyes: SADI  Neck: Trachea is midline, no adenopathy  Resp: Breathing unlabored.  Neuro: Motor function normal, Cranial nerves intact  Psych: Mood and affect appropriate.       Assessment:     Imaging:  X-ray left shoulder obtained today shows previously noted left humeral neck fracture in similar alignment continued interval callus formation.  No detrimental changes.  No worsening alignment or displacement.        1. Closed traumatic displaced fracture of proximal end of left humerus with routine healing, subsequent encounter          Plan:       Reviewed " the radiographs with the patient.  Recommended we transition him from home health to outpatient therapy at this time.  Patient is making good progress and is ready to continue increasing his activities.  We will see him back in clinic in 6 weeks for repeat radiographs and further evaluation.    Orders Placed This Encounter    Ambulatory referral/consult to Physical/Occupational Therapy     Follow up in about 6 weeks (around 7/19/2023).          Patient note was created using MModal Dictation.  Any errors in syntax or even information may not have been identified and edited on initial review prior to signing this note.

## 2023-07-11 ENCOUNTER — CLINICAL SUPPORT (OUTPATIENT)
Dept: REHABILITATION | Facility: HOSPITAL | Age: 73
End: 2023-07-11
Payer: MEDICARE

## 2023-07-11 DIAGNOSIS — R53.1 DECREASED STRENGTH: ICD-10-CM

## 2023-07-11 DIAGNOSIS — R68.89 DECREASED STRENGTH, ENDURANCE, AND MOBILITY: ICD-10-CM

## 2023-07-11 DIAGNOSIS — G89.29 CHRONIC PAIN OF BOTH KNEES: ICD-10-CM

## 2023-07-11 DIAGNOSIS — M25.661 DECREASED RANGE OF MOTION OF BOTH KNEES: ICD-10-CM

## 2023-07-11 DIAGNOSIS — M25.612 DECREASED RANGE OF MOTION OF LEFT SHOULDER: Primary | ICD-10-CM

## 2023-07-11 DIAGNOSIS — R52 PAIN: ICD-10-CM

## 2023-07-11 DIAGNOSIS — Z74.09 DECREASED STRENGTH, ENDURANCE, AND MOBILITY: ICD-10-CM

## 2023-07-11 DIAGNOSIS — R53.1 DECREASED STRENGTH, ENDURANCE, AND MOBILITY: ICD-10-CM

## 2023-07-11 DIAGNOSIS — M25.562 CHRONIC PAIN OF BOTH KNEES: ICD-10-CM

## 2023-07-11 DIAGNOSIS — M17.0 PRIMARY OSTEOARTHRITIS OF BOTH KNEES: Primary | ICD-10-CM

## 2023-07-11 DIAGNOSIS — M25.561 CHRONIC PAIN OF BOTH KNEES: ICD-10-CM

## 2023-07-11 DIAGNOSIS — M25.662 DECREASED RANGE OF MOTION OF BOTH KNEES: ICD-10-CM

## 2023-07-11 DIAGNOSIS — Z78.9 DECREASED ACTIVITIES OF DAILY LIVING (ADL): ICD-10-CM

## 2023-07-11 PROCEDURE — 97530 THERAPEUTIC ACTIVITIES: CPT | Performed by: PHYSICAL THERAPIST

## 2023-07-11 PROCEDURE — 97112 NEUROMUSCULAR REEDUCATION: CPT

## 2023-07-11 PROCEDURE — 97112 NEUROMUSCULAR REEDUCATION: CPT | Performed by: PHYSICAL THERAPIST

## 2023-07-11 PROCEDURE — 97140 MANUAL THERAPY 1/> REGIONS: CPT

## 2023-07-11 PROCEDURE — 97110 THERAPEUTIC EXERCISES: CPT

## 2023-07-11 NOTE — PROGRESS NOTES
[facility]  Occupational Therapy Treatment Note     Date: 7/11/2023  Name: Rodolfo Alvarez  Clinic Number: [id number]    Therapy Diagnosis:   Encounter Diagnoses   Name Primary?    Decreased range of motion of left shoulder Yes    Decreased strength     Decreased activities of daily living (ADL)     Pain        Physician: Nirmal Muro, YASIR    PPhysician Orders: Occupational Therapy to Evaluate and Treat  Medical Diagnosis: S42.202D (ICD-10-CM) - Closed traumatic displaced fracture of proximal end of left humerus with routine healing, subsequent encounter  Surgical Procedure and Date: N/A     Evaluation Date: 6/15/2023  Insurance Authorization Period Expiration: 6/20/2023 - 12/31/2023  Plan of Care Certification Period: 6/15/2023 - 9/15/2023  Progress Note Due: 7/15/2023     Date of Return to MD: N/A  Visit # / Visits authorized: 4 / 20 (5 Episode Visits)  FOTO: 1/3    Precautions:  Standard    Time In: 3:15  Time Out: 4:00  Total Treatment Time: 45 minutes  Total Billable Time: 45 minutes    Subjective     Patient reports: He is doing well. He has noticed an improvement with range of motion, he was feeling sick last week.     he was compliant with home exercise program given last session.   Response to previous treatment: Tolerated well  Functional change: Improved knowledge of HEP    Pain: 4/10  Location: left shoulder    Objective     Objective measures updated at progress report unless specified.    Treatment     Supervised Modalities: Modalities such as hot/cold packs, traction, unattended electrical stimulation, paraffin bath, fluidotherapy to reduce pain and increase soft tissue extensibility. Rodolfo received (0) minutes of modalities listed below after being cleared for contraindications.  x = modalities performed     Supervised Modalities 7/11/2023    Hot pack  Left shoulder                      Manual Therapy Techniques: Joint mobilizations, Soft tissue Mobilization, and mobilization  with movement applied to the area listed below. Rodolfo received (15) minutes of interventions. x = intervention performed today    Manual Intervention 7/11/2023    Soft Tissue Mobilization x Upper trapezius, deltoid, bicep, tricep, supraspinatus   Joint Mobilizations x Grade 1 oscillations    Mobilization with movement              Therapeutic Exercises: to develop strength, endurance, ROM, flexibility, posture and core stabilization. Rodolfo received (15) minutes of exercises listed below. x = performed today * = level of progression of activity     Therapeutic Exercise 7/11/2023    Table top active assist range of motion  - shoulder flexion  -abduction  -circles  -external rotation  x 10x with 10 second holds at end range   Overhead pulley  -flexion  -abduction x 2 minutes each   Supine dowel exercises  Chest press  Shoulder flexion x 2 sets of 10   Side lying internal rotation and external rotation  x 2 sets of 10   Sitting dowel shoulder flexion x 2 sets of 10        Therapeutic Activities: Everyday tasks to address the combined need of improved strength, range of motion, and endurance to achieve increased independence. While simulating these activities, the person is applying the gained skills of strength and improved range of motion in a practical way.  Rodolfo received (0) minutes of activities listed below. x = activities performed today * = level of progression of activity     Therapeutic Activities 7/11/2023                          Neuromuscular Re-education: the use of functional strengthening, stretching, balancing and coordination activities that train the nerves and muscles to react and communicate to restore normal body movement patterns to increase self care independence. Rodolfo received (15) minutes of interventions listed below.  x = interventions performed today * = level of progression of activity     Neuromuscular Re-education 7/11/2023    Pectoral stretch  -T (modified)  -Y(modified)  -W  (modified) x 3x - 30 second holds   Wall stretch for bicep and pec x 3x - 30 second holds   Doorway stretch arm at 45 x 3x - 30 second holds   Scapular retraction blue band x 2 sets of 10   Scapular depression blue band x 2 sets of 10     Self Care: Fundamental skills required to independently care for oneself. Activities of daily living such as eating, bathing, dressing, toileting, grooming, sleeping, transfers and mobility. Instrumental activities of daily living such as driving, medication management, pet care, home management/cleaning, financial management, using the phone, meal preparation and shopping. Rodolfo received (0) minutes of interventions listed below.  x = interventions performed * = level of progression of activity     Self Care 7/11/2023                            Home Exercises and Education Provided     Education provided:   - progress towards goals     Written Home Exercises Provided: Patient instructed to cont prior HEP.  Exercises were reviewed and Rodolfo was able to demonstrate them prior to the end of the session. Rodolfo demonstrated fair understanding of the home exercise program provided.     See EMR under Patient Instructions for exercises provided  6/15/2023 .        Assessment     Patient tolerated exercises well. He needs verbal cues to     Rodolfo is progressing towards his goals and there are no updates to goals at this time. Patient prognosis is Good.     Patient will continue to benefit from skilled outpatient occupational therapy to address the deficits listed in the problem list on initial evaluation provide patient/family education and to maximize patient's level of independence in the home and community environment.     Patient's spiritual, cultural and educational needs considered and patient agreeable to plan of care and goals.    Anticipated barriers to occupational therapy: Pain, home exercise program compliance     Goals:     Short Term Goals:  6 weeks  Progress      Pain:  Patient will demonstrate improved pain by reports of less than or equal to 4/10 worst pain on the verbal rating scale in order to progress toward maximal functional ability and improve quality of life. PC   Function: Patient will demonstrate improved function as indicated by a functional limitation score of less than or equal to 33 out of 100 on FOTO. PC   Mobility: Patient will improve AROM to 50% of stated goals, listed in objective measures above, in order to progress towards independence with functional activities.  PC   Strength: Patient will improve strength to 50% of stated goals, listed in objective measures above, in order to progress towards independence with functional activities.  PC   HEP: Patient will demonstrate independence with HEP in order to progress toward functional independence. PC      Long Term Goals:  12 weeks  Progress     Pain: Patient will demonstrate improved pain by reports of less than or equal to 3/10 worst pain on the verbal rating scale in order to progress toward maximal functional ability and improve quality of life.   PC   Function: Patient will demonstrate improved function as indicated by a functional limitation score of less than or equal to 32 out of 100 on FOTO. PC   Mobility: Patient will improve AROM to stated goals, listed in objective measures above, in order to return to maximal functional potential and improve quality of life. PC   Strength: Patient will improve strength to stated goals, listed in objective measures above, in order to improve functional independence and quality of life. PC   Patient will return to normal ADL's, IADL's, community involvement, recreational activities, and work-related activities with less than or equal to 1/10 pain and maximal function.  PC      Goals Key:  PC= Progressing/Continue;       PM= Partially Met;       GM= Goal Met      DC= Discontinue       Plan     Continue Plan of Care (POC) and progress per patient tolerance.      Estrellita  Minerva, OT  ,OTD, OTR/L

## 2023-07-11 NOTE — PROGRESS NOTES
"[facility]   Physical Therapy Treatment Note        Name: Rodolfo Alvarez  Clinic Number: [id number]    Therapy Diagnosis:   Encounter Diagnoses   Name Primary?    Primary osteoarthritis of both knees Yes    Chronic pain of both knees     Decreased range of motion of both knees     Decreased strength, endurance, and mobility      Physician: Katelynn Azul PA    Visit Date: 7/11/2023    Physician Orders: PT Eval and Treat  Medical Diagnosis from Referral: primary osteoarthritis of both knees  Evaluation Date: 6/15/2023  Authorization Period Expiration: 12/3/2023  Plan of Care Expiration: 9/13/2023  Progress Note Due: 7/15/2023  Visit # / Visits authorized: 4/20 (+eval)   FOTO: 1/3 (last performed on 6/15/2023)     Precautions: Standard and recent humerus fracture    PTA Visit #: 0/5     Time In: 1434  Time Out: 1520  Total Billable Time: 44 minutes (Billing reflects 1 on 1 treatment time spent with patient)    Subjective     Patient reports: that he is feeling pretty good again today with no complaints of pain.     He/She was compliant with home exercise program.  Response to previous treatment: no adverse reaction   Functional change: decreasing pain levels, improving LE strength    Pain: 0/10  (left knee today)  Location: bilateral knee pain, L>R    Objective      Objective Measures updated at progress report or POC update only unless otherwise noted.       Treatment     Rodolfo received the treatments listed below:       THERAPEUTIC EXERCISES to develop strength, endurance, ROM, flexibility, posture, and core stabilization for (7) minutes including:    Intervention Performed Today    Nustep for ROM and strength x 6' level 3   Ball squeezes   3', 3" holds on and off                                   Plan for Next Visit:          NEUROMUSCULAR RE-EDUCATION ACTIVITIES to improve Balance, Coordination, Kinesthetic, Sense, Proprioception, and Posture for (24) minutes.  The following were " "included:    Intervention Performed Today    Quad sets x 3', 5" holds B LE   SAQ (progressed) x 3', B LE, 3#   Straight leg raises x 3 x 10, each LE   Sidelying clams x 2 x 10 each LE   LAQ (progressed) x 3', B LE, 2#                    Plan for Next Visit:          THERAPEUTIC ACTIVITIES to improve dynamic and functional  performance for (13) minutes including:    Intervention Performed Today    Bridges x 2 x 10   Sit to Stands x 2 x 10 (hi-low mat), higher surface without use of UE of assistance    Standing TKE x 3 x 10 each LE, purple band   Step ups  x 1 x 10 each foot, 4" step                         Plan for Next Visit: side stepping,           Patient Education and Home Exercises       Home Exercises Provided and Patient Education Provided     Education provided:   PURPOSE: Patient educated on the impairments noted above and the effects of physical therapy intervention to improve overall condition and QOL.   EXERCISE: Patient was educated on all the above exercise prior/during/after for proper posture, positioning, and execution for safe performance with home exercise program.   STRENGTH: Patient educated on the importance of improved core and extremity strength in order to improve alignment of the spine and extremities with static positions and dynamic movement.   GAIT & BALANCE: Patient educated on the importance of strong core and lower extremity musculature in order to improve both static and dynamic balance, improve gait mechanics, reduce fall risk and improve household and community mobility.   POSTURE: Patient educated on postural awareness to reduce stress and maintain optimal alignment of the spine with static positions and dynamic movement     Written Home Exercises Provided: yes.  Exercises were reviewed and Rodolfo was able to demonstrate them prior to the end of the session.  Rodolfo demonstrated good  understanding of the education provided. See EMR under Patient Instructions for exercises " provided during therapy sessions.    Assessment     Patient tolerated treatment well and without complaint. He demonstrates improving LE strength as demonstrated through exercise progressions this visit. He is appropriate to begin to progress to more functional standing activities in upcoming visits. Patient would also benefit from continued core strengthening.    Rodolfo is progressing well towards his goals.   Patient prognosis is Fair.     Patient will continue to benefit from skilled outpatient physical therapy to address the deficits listed in the problem list box on initial evaluation, provide pt/family education and to maximize patient's level of independence in the home and community environment.     Patient's spiritual, cultural and educational needs considered and pt agreeable to plan of care and goals.     Anticipated Barriers for therapy: co-morbidities, sedentary lifestyle, chronicity of condition, and adherence to treatment plan    Goal:  Short Term Goals:  6 weeks Status  Date Met   PAIN: Pt will report worst pain of 5/10 in order to progress toward max functional ability and improve quality of life. [x] Progressing  [] Met  [] Not Met     FUNCTION: Patient will demonstrate improved function as indicated by a functional limitation score of less than or equal to 48 out of 100 on FOTO. [x] Progressing  [] Met  [] Not Met     STRENGTH: Patient will improve strength to 50% of stated goals, listed in objective measures above, in order to progress towards independence with functional activities. [x] Progressing  [] Met  [] Not Met     POSTURE: Patient will correct postural deviations in sitting and standing, to decrease pain and promote long term stability.  [x] Progressing  [] Met  [] Not Met     GAIT: Patient will demonstrate improved gait mechanics in order to improve functional mobility, improve quality of life, and decrease risk of further injury or fall.  [x] Progressing  [] Met  [] Not Met     HEP:  Patient will demonstrate independence with HEP in order to progress toward functional independence. [x] Progressing  [] Met  [] Not Met        Long Term Goals:  12 weeks Status Date Met   PAIN: Pt will report worst pain of 3/10 in order to progress toward max functional ability and improve quality of life [x] Progressing  [] Met  [] Not Met     FUNCTION: Patient will demonstrate improved function as indicated by a functional limitation score of less than or equal to 42 out of 100 on FOTO. [x] Progressing  [] Met  [] Not Met     MOBILITY: Patient will improve AROM to stated goals, listed in objective measures above, in order to return to maximal functional potential and improve quality of life.  [x] Progressing  [] Met  [] Not Met     STRENGTH: Patient will improve strength to stated goals, listed in objective measures above, in order to improve functional independence and quality of life.  [x] Progressing  [] Met  [] Not Met     GAIT: Patient will demonstrate normalized gait mechanics with minimal compensation in order to return to PLOF. [x] Progressing  [] Met  [] Not Met     Patient will return to normal ADL's, IADL's, community involvement, recreational activities, and work-related activities with less than or equal to 2/10 pain and maximal function.  [x] Progressing  [] Met  [] Not Met           Plan     Continue Plan of Care (POC) and progress per patient tolerance. See treatment section for details on planned progressions next session.    6/15/2023 (evaluation): Outpatient Physical Therapy 2 times weekly for 12 weeks to include any combination of the following interventions: virtual visits, dry needling, modalities, electrical stimulation (IFC, Pre-Mod, Attended with Functional Dry Needling), Aquatic Therapy, Cervical/Lumbar Traction, Gait Training, Manual Therapy, Neuromuscular Re-ed, Patient Education, Self Care, Therapeutic Exercise, and Therapeutic Activites     Irma Garzon, PT

## 2023-07-13 ENCOUNTER — CLINICAL SUPPORT (OUTPATIENT)
Dept: REHABILITATION | Facility: HOSPITAL | Age: 73
End: 2023-07-13
Payer: MEDICARE

## 2023-07-13 DIAGNOSIS — G89.29 CHRONIC PAIN OF BOTH KNEES: ICD-10-CM

## 2023-07-13 DIAGNOSIS — M25.561 CHRONIC PAIN OF BOTH KNEES: ICD-10-CM

## 2023-07-13 DIAGNOSIS — R52 PAIN: ICD-10-CM

## 2023-07-13 DIAGNOSIS — R53.1 DECREASED STRENGTH, ENDURANCE, AND MOBILITY: ICD-10-CM

## 2023-07-13 DIAGNOSIS — Z78.9 DECREASED ACTIVITIES OF DAILY LIVING (ADL): ICD-10-CM

## 2023-07-13 DIAGNOSIS — M25.562 CHRONIC PAIN OF BOTH KNEES: ICD-10-CM

## 2023-07-13 DIAGNOSIS — Z74.09 DECREASED STRENGTH, ENDURANCE, AND MOBILITY: ICD-10-CM

## 2023-07-13 DIAGNOSIS — M17.0 PRIMARY OSTEOARTHRITIS OF BOTH KNEES: Primary | ICD-10-CM

## 2023-07-13 DIAGNOSIS — M25.662 DECREASED RANGE OF MOTION OF BOTH KNEES: ICD-10-CM

## 2023-07-13 DIAGNOSIS — M25.661 DECREASED RANGE OF MOTION OF BOTH KNEES: ICD-10-CM

## 2023-07-13 DIAGNOSIS — R53.1 DECREASED STRENGTH: ICD-10-CM

## 2023-07-13 DIAGNOSIS — R68.89 DECREASED STRENGTH, ENDURANCE, AND MOBILITY: ICD-10-CM

## 2023-07-13 DIAGNOSIS — M25.612 DECREASED RANGE OF MOTION OF LEFT SHOULDER: Primary | ICD-10-CM

## 2023-07-13 PROCEDURE — 97110 THERAPEUTIC EXERCISES: CPT | Performed by: PHYSICAL THERAPIST

## 2023-07-13 PROCEDURE — 97530 THERAPEUTIC ACTIVITIES: CPT | Performed by: PHYSICAL THERAPIST

## 2023-07-13 PROCEDURE — 97112 NEUROMUSCULAR REEDUCATION: CPT

## 2023-07-13 PROCEDURE — 97140 MANUAL THERAPY 1/> REGIONS: CPT

## 2023-07-13 PROCEDURE — 97112 NEUROMUSCULAR REEDUCATION: CPT | Performed by: PHYSICAL THERAPIST

## 2023-07-13 PROCEDURE — 97110 THERAPEUTIC EXERCISES: CPT

## 2023-07-13 NOTE — PROGRESS NOTES
[facility]  Occupational Therapy Treatment Note and Discharge Summary     Date: 7/13/2023  Name: Rodolfo Alvarez  Clinic Number: [id number]    Therapy Diagnosis:   Encounter Diagnoses   Name Primary?    Decreased range of motion of left shoulder Yes    Decreased strength     Decreased activities of daily living (ADL)     Pain        Physician: Nirmal Muro PA-C    Physician Orders: Occupational Therapy to Evaluate and Treat  Medical Diagnosis: S42.202D (ICD-10-CM) - Closed traumatic displaced fracture of proximal end of left humerus with routine healing, subsequent encounter  Surgical Procedure and Date: N/A     Evaluation Date: 6/15/2023  Insurance Authorization Period Expiration: 6/20/2023 - 12/31/2023  Plan of Care Certification Period: 6/15/2023 - 9/15/2023  Progress Note Due: 7/15/2023     Date of Return to MD: N/A  Visit # / Visits authorized: 5 / 20 (6 Episode Visits)  FOTO: 1/3    Precautions:  Standard    Time In: 2:30  Time Out: 3:15  Total Treatment Time: 45 minutes  Total Billable Time: 45 minutes    Subjective     Patient reports: He is doing well. He feels he is functional and would like to be discharged.     he was compliant with home exercise program given last session.   Response to previous treatment: Tolerated well  Functional change: Improved knowledge of HEP    Pain: 3/10  Location: left shoulder    Objective     RANGE OF MOTION:        Shoulder AROM/PROM Right Left Left  7/13/2023 Goal  Left   Shoulder Flexion (180) Within normal limits  70 95 120   Shoulder Extension (60)  10 20 20   Shoulder Abduction (180)  65 80 90   Shoulder External Rotation (90)  20 35 30   Shoulder Internal Rotation (70)  15 45 25      Elbow AROM/PROM Right Left Pain/Dysfunction with Movement Goal   Elbow Flexion (150) Within normal limits  Within normal limits        Elbow Extension (0)            Comments:        STRENGTH:     U/E MMT Right Left Left  7/13/2023 Goal   Shoulder Flexion 4+/5 3-/5   3+/5 3+/5   Shoulder Extension 4+/5 3-/5  3-/5 3+/5   Shoulder Abduction 4+/5 3-/5   3+/5 3+/5   Shoulder Internal Rotation 4+/5 3-/5   3-/5 3+/5   Shoulder External Rotation    4+/5 3-/5   3-/5 3+/5   Elbow Flexion  5/5 4/5  4+/5 4+/5   Elbow Extension 5/5 4/5  4+/5  4+/5        Treatment     Supervised Modalities: Modalities such as hot/cold packs, traction, unattended electrical stimulation, paraffin bath, fluidotherapy to reduce pain and increase soft tissue extensibility. Rodolfo received (0) minutes of modalities listed below after being cleared for contraindications.  x = modalities performed     Supervised Modalities 7/13/2023    Hot pack  Left shoulder                      Manual Therapy Techniques: Joint mobilizations, Soft tissue Mobilization, and mobilization with movement applied to the area listed below. Rodolfo received (15) minutes of interventions. x = intervention performed today    Manual Intervention 7/13/2023    Soft Tissue Mobilization x Upper trapezius, deltoid, bicep, tricep, supraspinatus   Joint Mobilizations x Grade 1 oscillations    Mobilization with movement              Therapeutic Exercises: to develop strength, endurance, ROM, flexibility, posture and core stabilization. Rodolfo received (15) minutes of exercises listed below. x = performed today * = level of progression of activity     Therapeutic Exercise 7/13/2023    Table top active assist range of motion  - shoulder flexion  -abduction  -circles  -external rotation  x 10x with 10 second holds at end range   Overhead pulley  -flexion  -abduction x 2 minutes each   Supine dowel exercises  Chest press  Shoulder flexion x 2 sets of 10   Side lying internal rotation and external rotation  x 2 sets of 10   Sitting dowel shoulder flexion x 2 sets of 10        Therapeutic Activities: Everyday tasks to address the combined need of improved strength, range of motion, and endurance to achieve increased independence. While simulating these  activities, the person is applying the gained skills of strength and improved range of motion in a practical way.  Rodolfo received (0) minutes of activities listed below. x = activities performed today * = level of progression of activity     Therapeutic Activities 7/13/2023                          Neuromuscular Re-education: the use of functional strengthening, stretching, balancing and coordination activities that train the nerves and muscles to react and communicate to restore normal body movement patterns to increase self care independence. Rodolfo received (15) minutes of interventions listed below.  x = interventions performed today * = level of progression of activity     Neuromuscular Re-education 7/13/2023    Pectoral stretch  -T (modified)  -Y(modified)  -W (modified) x 3x - 30 second holds   Wall stretch for bicep and pec x 3x - 30 second holds   Doorway stretch arm at 45 x 3x - 30 second holds   Scapular retraction blue band x 2 sets of 10   Scapular depression blue band x 2 sets of 10     Self Care: Fundamental skills required to independently care for oneself. Activities of daily living such as eating, bathing, dressing, toileting, grooming, sleeping, transfers and mobility. Instrumental activities of daily living such as driving, medication management, pet care, home management/cleaning, financial management, using the phone, meal preparation and shopping. Rodolfo received (0) minutes of interventions listed below.  x = interventions performed * = level of progression of activity     Self Care 7/13/2023                            Home Exercises and Education Provided     Education provided:   - progress towards goals     Written Home Exercises Provided: Patient instructed to cont prior HEP.  Exercises were reviewed and Rodolfo was able to demonstrate them prior to the end of the session. Rodolfo demonstrated fair understanding of the home exercise program provided.     See EMR under Patient Instructions  for exercises provided  6/15/2023 .        Assessment     Patient tolerated exercises well. He was educated on importance of continued stretching to improve range of motion. Patient reports he feels functional and would like to be discharged in current state.     Rodolfo is progressing towards his goals and there are no updates to goals at this time. Patient prognosis is Good.     Patient will continue to benefit from skilled outpatient occupational therapy to address the deficits listed in the problem list on initial evaluation provide patient/family education and to maximize patient's level of independence in the home and community environment.     Patient's spiritual, cultural and educational needs considered and patient agreeable to plan of care and goals.    Anticipated barriers to occupational therapy: Pain, home exercise program compliance     Goals:     Short Term Goals:  6 weeks  Progress      Pain: Patient will demonstrate improved pain by reports of less than or equal to 4/10 worst pain on the verbal rating scale in order to progress toward maximal functional ability and improve quality of life. GM   Function: Patient will demonstrate improved function as indicated by a functional limitation score of less than or equal to 33 out of 100 on FOTO. GM   Mobility: Patient will improve AROM to 50% of stated goals, listed in objective measures above, in order to progress towards independence with functional activities.  GM   Strength: Patient will improve strength to 50% of stated goals, listed in objective measures above, in order to progress towards independence with functional activities.  GM   HEP: Patient will demonstrate independence with HEP in order to progress toward functional independence. GM      Long Term Goals:  12 weeks  Progress     Pain: Patient will demonstrate improved pain by reports of less than or equal to 3/10 worst pain on the verbal rating scale in order to progress toward maximal  functional ability and improve quality of life.   GM   Function: Patient will demonstrate improved function as indicated by a functional limitation score of less than or equal to 32 out of 100 on FOTO. GM   Mobility: Patient will improve AROM to stated goals, listed in objective measures above, in order to return to maximal functional potential and improve quality of life. DC   Strength: Patient will improve strength to stated goals, listed in objective measures above, in order to improve functional independence and quality of life. DC   Patient will return to normal ADL's, IADL's, community involvement, recreational activities, and work-related activities with less than or equal to 1/10 pain and maximal function.  DC      Goals Key:  PC= Progressing/Continue;       PM= Partially Met;       GM= Goal Met      DC= Discontinue       Discharge reason: Patient requested discharge    Plan   This patient is discharged from Occupational Therapy    Estrellita Palma, OT ,OTD, OTR/L  7/13/2023

## 2023-07-13 NOTE — PROGRESS NOTES
[facility]   Physical Therapy Treatment Note + Progress Note       Name: Rodolfo Alvarez  Clinic Number: [id number]    Therapy Diagnosis:   Encounter Diagnoses   Name Primary?    Primary osteoarthritis of both knees Yes    Chronic pain of both knees     Decreased range of motion of both knees     Decreased strength, endurance, and mobility      Physician: Katelynn Azul PA    Visit Date: 7/13/2023    Physician Orders: PT Eval and Treat  Medical Diagnosis from Referral: primary osteoarthritis of both knees  Evaluation Date: 6/15/2023  Authorization Period Expiration: 12/3/2023  Plan of Care Expiration: 9/13/2023  Progress Note Due: 8/12/2023  Visit # / Visits authorized: 5/20 (+eval)   FOTO: 1/3 (last performed on 6/15/2023)     Precautions: Standard and recent humerus fracture    PTA Visit #: 0/5     Time In: 1418  Time Out: 1505  Total Billable Time: 42 minutes (Billing reflects 1 on 1 treatment time spent with patient)    Subjective     Patient reports: feeling okay. He has no new complaints. Patient reports that he does think PT is helping with pain levels in his knees.     He/She was compliant with home exercise program.  Response to previous treatment: no adverse reaction   Functional change: decreasing pain levels, improving LE strength    Pain: 0/10  (left knee today)  Location: bilateral knee pain, L>R    Objective      Objective Measures updated at progress report or POC update only unless otherwise noted.     RANGE OF MOTION: **numbers in () are from initial evaluation)  Knee AROM/PROM Right Left Pain/Dysfunction with Movement Goal   Knee Flexion (135º) 130  (124) 130  (130) No pain with ROM measurements today 130   Knee Extension (0º) -8  (-12) -4  (-6)  0         STRENGTH:   L/E MMT Right Left Pain/Dysfunction with Movement Goal Right   7/13/2023  Left  7/13/2023    Hip Flexion  4+/5 4+/5 No pain with MMT's this visit 4+/5 B 4+ 4+   Hip Extension  3+/5 3+/5   4+/5 B 4- 4-    Hip Abduction  3+/5 3+/5   4+/5 B 3+ 3+   Knee Extension 4+/5 4+/5   5/5 B 4+ 4+   Knee Flexion 4+/5 4+/5   5/5 B 5 5   Hip IR 4-/5 4-/5   4+/5 B 4- 4-   Hip ER 4-/5 4-/5   4+/5 B 4- 4-   Ankle DF 4+/5 4+/5   5/5 B 5 5   Ankle PF 4+/5 4+/5   5/5 B 4+ 4+         MUSCLE LENGTH:   Muscle Tested  Right Left  Goal   Hip Flexors [] Normal  [x] Limited [] Normal  [x] Limited Normal B   ITB / TFL [] Normal  [x] Limited [] Normal  [x] Limited Normal B   Quadriceps [] Normal  [x] Limited [] Normal  [x] Limited Normal B   Hamstrings  [] Normal  [] Limited [] Normal  [x] Limited Normal B   Gastrocnemius  [] Normal  [x] Limited [] Normal  [x] Limited Normal B   Soleus  [] Normal  [x] Limited [] Normal  [x] Limited Normal B    **Although still decreased, muscle length has improved as compared to initial evaluation.      JOINT MOBILITY:   Joint Motion Right Mobility Left Mobility Goal   Distal Femur AP [x] Hypo     [] Normal     [] Hyper [x] Hypo     [] Normal     [] Hyper Normal    Proximal Tibia AP [x] Hypo     [] Normal     [] Hyper [x] Hypo     [] Normal     [] Hyper Normal    Patellar Medial Glide  [] Hypo     [x] Normal     [] Hyper [] Hypo     [x] Normal     [] Hyper Normal    Patellar Lateral Glide  [] Hypo     [x] Normal     [] Hyper [] Hypo     [x] Normal     [] Hyper Normal    Patellar Superior Glide  [] Hypo     [x] Normal     [] Hyper [] Hypo     [x] Normal     [] Hyper Normal    Patellar Inferior Glide  [] Hypo     [x] Normal     [] Hyper [] Hypo     [x] Normal     [] Hyper Normal          SPECIAL TESTS:     Right  (spine) Left  Goal   Varus Stress Test [] Positive    [x] Negative [] Positive    [x] Negative Negative B    Valgus Stress Test [] Positive    [x] Negative [] Positive    [x] Negative Negative B          SENSATION  [x] Intact to Light Touch                       [] Impaired:        PALPATION: Muscles: Increased tone and tenderness to palpation of: bilateral quadriceps, hamstrings, hip adductors, pes  "anserine, gastrocnemius, but decreased as compared to previous visits.         POSTURE:  Pt presents with postural abnormalities which include:               [x] Forward Head                                [] Increased Lumbar Lordosis              [x] Rounded Shoulder                         [] Genu Recurvatum              [] Increased Thoracic Kyphosis        [x] Genu Valgus - L LE              [] Trunk Deviated                              [] Pes Planus              [] Scapular Winging                          [x] Other: Genu Varus R LE        GAIT ANALYSIS The patient ambulated with the following assistive device: straight cane; the pt presents with the following gait abnormalities: bradykinetic, increased ALVARADO, decreased step length bilateral, decreased knee flexion on right, and decreased knee extension bilateral  7/13/2023: Patient still presents ambulating with  straight cane. He now demonstrates light improvement in mannie and step length. His right knee flexion has improved as well. Bilateral knee extension during gait still limited but improved.         FUNCTIONAL MOVEMENT PATTERNS  Movement Analysis Notes   Bed Mobility  []Functional  [x]Dysfunctional:  []Painful  []Non-Painful    Independent but would benefit from continued core strengthening   Sit to Stand []Functional  [x]Dysfunctional:  []Painful  [x]Non-Painful    Decreased WB on R LE, uses UE support   Squat []Functional  [x]Dysfunctional:  [x]Painful  []Non-Painful              Function:       Treatment     Rodolfo received the treatments listed below:       THERAPEUTIC EXERCISES to develop strength, endurance, ROM, flexibility, posture, and core stabilization for (14) minutes including:    Intervention Performed Today    Nustep for ROM and strength x 6' level 3   Ball squeezes   3', 3" holds on and off                            Progress Note x See objective section for details      Plan for Next Visit:          NEUROMUSCULAR RE-EDUCATION " "ACTIVITIES to improve Balance, Coordination, Kinesthetic, Sense, Proprioception, and Posture for (19) minutes.  The following were included:    Intervention Performed Today    Quad sets x 3', 5" holds B LE   SAQ (progress # next visit) x 3', B LE, 3#   Straight leg raises x 3 x 10, each LE   Sidelying clams x 2 x 10 each LE   LAQ (progressed) x 3', B LE, 3#                    Plan for Next Visit:          THERAPEUTIC ACTIVITIES to improve dynamic and functional  performance for (9) minutes including:    Intervention Performed Today    Bridges x 2 x 10   Sit to Stands x 2 x 10 (hi-low mat), higher surface without use of UE of assistance    Standing TKE x 3 x 10 each LE, purple band   Step ups   1 x 10 each foot, 4" step                         Plan for Next Visit: side stepping,           Patient Education and Home Exercises       Home Exercises Provided and Patient Education Provided     Education provided:   PURPOSE: Patient educated on the impairments noted above and the effects of physical therapy intervention to improve overall condition and QOL.   EXERCISE: Patient was educated on all the above exercise prior/during/after for proper posture, positioning, and execution for safe performance with home exercise program.   STRENGTH: Patient educated on the importance of improved core and extremity strength in order to improve alignment of the spine and extremities with static positions and dynamic movement.   GAIT & BALANCE: Patient educated on the importance of strong core and lower extremity musculature in order to improve both static and dynamic balance, improve gait mechanics, reduce fall risk and improve household and community mobility.   POSTURE: Patient educated on postural awareness to reduce stress and maintain optimal alignment of the spine with static positions and dynamic movement     Written Home Exercises Provided: yes.  Exercises were reviewed and Rodolfo was able to demonstrate them prior to the end " of the session.  Rodolfo demonstrated good  understanding of the education provided. See EMR under Patient Instructions for exercises provided during therapy sessions.    Assessment     Patient tolerated treatment well and without complaint. He has attended 6 total PT visits and has made good overall progress with PT, demonstrating improvements in bilateral knee range of motion, lower extremity strength, and overall gait. Slight improvement in patellar mobility noted bilaterally, and he reports decreased overall pain levels with functional activities. Patient is progressing well towards goals but would benefit from continued PT in order to address remaining limitations in bilateral knee range of motion, lower extremity strength, and gait in order to achieve max functional potential.     Rodolfo is progressing well towards his goals.   Patient prognosis is Fair.     Patient will continue to benefit from skilled outpatient physical therapy to address the deficits listed in the problem list box on initial evaluation, provide pt/family education and to maximize patient's level of independence in the home and community environment.     Patient's spiritual, cultural and educational needs considered and pt agreeable to plan of care and goals.     Anticipated Barriers for therapy: co-morbidities, sedentary lifestyle, chronicity of condition, and adherence to treatment plan    Goal:  Short Term Goals:  6 weeks Status  Date Met   PAIN: Pt will report worst pain of 5/10 in order to progress toward max functional ability and improve quality of life. [] Progressing  [x] Met  [] Not Met  7/13/2023   FUNCTION: Patient will demonstrate improved function as indicated by a functional limitation score of less than or equal to 48 out of 100 on FOTO. [] Progressing  [x] Met  [] Not Met  7/13/2023   STRENGTH: Patient will improve strength to 50% of stated goals, listed in objective measures above, in order to progress towards independence  with functional activities. [x] Progressing  [] Met  [] Not Met Partially Met  7/13/2023    POSTURE: Patient will correct postural deviations in sitting and standing, to decrease pain and promote long term stability.  [x] Progressing  [] Met  [] Not Met     GAIT: Patient will demonstrate improved gait mechanics in order to improve functional mobility, improve quality of life, and decrease risk of further injury or fall.  [] Progressing  [x] Met  [] Not Met 7/13/2023    HEP: Patient will demonstrate independence with HEP in order to progress toward functional independence. [] Progressing  [x] Met  [] Not Met 7/13/2023       Long Term Goals:  12 weeks Status Date Met   PAIN: Pt will report worst pain of 3/10 in order to progress toward max functional ability and improve quality of life [x] Progressing  [] Met  [] Not Met     FUNCTION: Patient will demonstrate improved function as indicated by a functional limitation score of less than or equal to 42 out of 100 on FOTO. [] Progressing  [x] Met  [] Not Met  7/13/2023   MOBILITY: Patient will improve AROM to stated goals, listed in objective measures above, in order to return to maximal functional potential and improve quality of life.  [x] Progressing  [] Met  [] Not Met Partially Met  7/13/2023    STRENGTH: Patient will improve strength to stated goals, listed in objective measures above, in order to improve functional independence and quality of life.  [x] Progressing  [] Met  [] Not Met     GAIT: Patient will demonstrate normalized gait mechanics with minimal compensation in order to return to PLOF. [x] Progressing  [] Met  [] Not Met     Patient will return to normal ADL's, IADL's, community involvement, recreational activities, and work-related activities with less than or equal to 2/10 pain and maximal function.  [x] Progressing  [] Met  [] Not Met           Plan     Continue Plan of Care (POC) and progress per patient tolerance. See treatment section for details  on planned progressions next session.    6/15/2023 (evaluation): Outpatient Physical Therapy 2 times weekly for 12 weeks to include any combination of the following interventions: virtual visits, dry needling, modalities, electrical stimulation (IFC, Pre-Mod, Attended with Functional Dry Needling), Aquatic Therapy, Cervical/Lumbar Traction, Gait Training, Manual Therapy, Neuromuscular Re-ed, Patient Education, Self Care, Therapeutic Exercise, and Therapeutic Activites     Irma Garozn, PT

## 2023-07-13 NOTE — PLAN OF CARE
[facility]   Physical Therapy Treatment Note + Progress Note       Name: Rodolfo Alvarez  Clinic Number: [id number]    Therapy Diagnosis:   Encounter Diagnoses   Name Primary?    Primary osteoarthritis of both knees Yes    Chronic pain of both knees     Decreased range of motion of both knees     Decreased strength, endurance, and mobility      Physician: Katelynn Azul PA    Visit Date: 7/13/2023    Physician Orders: PT Eval and Treat  Medical Diagnosis from Referral: primary osteoarthritis of both knees  Evaluation Date: 6/15/2023  Authorization Period Expiration: 12/3/2023  Plan of Care Expiration: 9/13/2023  Progress Note Due: 8/12/2023  Visit # / Visits authorized: 5/20 (+eval)   FOTO: 1/3 (last performed on 6/15/2023)     Precautions: Standard and recent humerus fracture    PTA Visit #: 0/5     Time In: 1418  Time Out: 1505  Total Billable Time: 42 minutes (Billing reflects 1 on 1 treatment time spent with patient)    Subjective     Patient reports: feeling okay. He has no new complaints. Patient reports that he does think PT is helping with pain levels in his knees.     He/She was compliant with home exercise program.  Response to previous treatment: no adverse reaction   Functional change: decreasing pain levels, improving LE strength    Pain: 0/10  (left knee today)  Location: bilateral knee pain, L>R    Objective      Objective Measures updated at progress report or POC update only unless otherwise noted.     RANGE OF MOTION: **numbers in () are from initial evaluation)  Knee AROM/PROM Right Left Pain/Dysfunction with Movement Goal   Knee Flexion (135º) 130  (124) 130  (130) No pain with ROM measurements today 130   Knee Extension (0º) -8  (-12) -4  (-6)  0         STRENGTH:   L/E MMT Right Left Pain/Dysfunction with Movement Goal Right   7/13/2023  Left  7/13/2023    Hip Flexion  4+/5 4+/5 No pain with MMT's this visit 4+/5 B 4+ 4+   Hip Extension  3+/5 3+/5   4+/5 B 4- 4-    Hip Abduction  3+/5 3+/5   4+/5 B 3+ 3+   Knee Extension 4+/5 4+/5   5/5 B 4+ 4+   Knee Flexion 4+/5 4+/5   5/5 B 5 5   Hip IR 4-/5 4-/5   4+/5 B 4- 4-   Hip ER 4-/5 4-/5   4+/5 B 4- 4-   Ankle DF 4+/5 4+/5   5/5 B 5 5   Ankle PF 4+/5 4+/5   5/5 B 4+ 4+         MUSCLE LENGTH:   Muscle Tested  Right Left  Goal   Hip Flexors [] Normal  [x] Limited [] Normal  [x] Limited Normal B   ITB / TFL [] Normal  [x] Limited [] Normal  [x] Limited Normal B   Quadriceps [] Normal  [x] Limited [] Normal  [x] Limited Normal B   Hamstrings  [] Normal  [] Limited [] Normal  [x] Limited Normal B   Gastrocnemius  [] Normal  [x] Limited [] Normal  [x] Limited Normal B   Soleus  [] Normal  [x] Limited [] Normal  [x] Limited Normal B    **Although still decreased, muscle length has improved as compared to initial evaluation.      JOINT MOBILITY:   Joint Motion Right Mobility Left Mobility Goal   Distal Femur AP [x] Hypo     [] Normal     [] Hyper [x] Hypo     [] Normal     [] Hyper Normal    Proximal Tibia AP [x] Hypo     [] Normal     [] Hyper [x] Hypo     [] Normal     [] Hyper Normal    Patellar Medial Glide  [] Hypo     [x] Normal     [] Hyper [] Hypo     [x] Normal     [] Hyper Normal    Patellar Lateral Glide  [] Hypo     [x] Normal     [] Hyper [] Hypo     [x] Normal     [] Hyper Normal    Patellar Superior Glide  [] Hypo     [x] Normal     [] Hyper [] Hypo     [x] Normal     [] Hyper Normal    Patellar Inferior Glide  [] Hypo     [x] Normal     [] Hyper [] Hypo     [x] Normal     [] Hyper Normal          SPECIAL TESTS:     Right  (spine) Left  Goal   Varus Stress Test [] Positive    [x] Negative [] Positive    [x] Negative Negative B    Valgus Stress Test [] Positive    [x] Negative [] Positive    [x] Negative Negative B          SENSATION  [x] Intact to Light Touch                       [] Impaired:        PALPATION: Muscles: Increased tone and tenderness to palpation of: bilateral quadriceps, hamstrings, hip adductors, pes  "anserine, gastrocnemius, but decreased as compared to previous visits.         POSTURE:  Pt presents with postural abnormalities which include:               [x] Forward Head                                [] Increased Lumbar Lordosis              [x] Rounded Shoulder                         [] Genu Recurvatum              [] Increased Thoracic Kyphosis        [x] Genu Valgus - L LE              [] Trunk Deviated                              [] Pes Planus              [] Scapular Winging                          [x] Other: Genu Varus R LE        GAIT ANALYSIS The patient ambulated with the following assistive device: straight cane; the pt presents with the following gait abnormalities: bradykinetic, increased ALVARADO, decreased step length bilateral, decreased knee flexion on right, and decreased knee extension bilateral  7/13/2023: Patient still presents ambulating with  straight cane. He now demonstrates light improvement in mannie and step length. His right knee flexion has improved as well. Bilateral knee extension during gait still limited but improved.         FUNCTIONAL MOVEMENT PATTERNS  Movement Analysis Notes   Bed Mobility  []Functional  [x]Dysfunctional:  []Painful  []Non-Painful    Independent but would benefit from continued core strengthening   Sit to Stand []Functional  [x]Dysfunctional:  []Painful  [x]Non-Painful    Decreased WB on R LE, uses UE support   Squat []Functional  [x]Dysfunctional:  [x]Painful  []Non-Painful              Function:       Treatment     Rodolfo received the treatments listed below:       THERAPEUTIC EXERCISES to develop strength, endurance, ROM, flexibility, posture, and core stabilization for (14) minutes including:    Intervention Performed Today    Nustep for ROM and strength x 6' level 3   Ball squeezes   3', 3" holds on and off                            Progress Note x See objective section for details      Plan for Next Visit:          NEUROMUSCULAR RE-EDUCATION " "ACTIVITIES to improve Balance, Coordination, Kinesthetic, Sense, Proprioception, and Posture for (19) minutes.  The following were included:    Intervention Performed Today    Quad sets x 3', 5" holds B LE   SAQ (progress # next visit) x 3', B LE, 3#   Straight leg raises x 3 x 10, each LE   Sidelying clams x 2 x 10 each LE   LAQ (progressed) x 3', B LE, 3#                    Plan for Next Visit:          THERAPEUTIC ACTIVITIES to improve dynamic and functional  performance for (9) minutes including:    Intervention Performed Today    Bridges x 2 x 10   Sit to Stands x 2 x 10 (hi-low mat), higher surface without use of UE of assistance    Standing TKE x 3 x 10 each LE, purple band   Step ups   1 x 10 each foot, 4" step                         Plan for Next Visit: side stepping,           Patient Education and Home Exercises       Home Exercises Provided and Patient Education Provided     Education provided:   PURPOSE: Patient educated on the impairments noted above and the effects of physical therapy intervention to improve overall condition and QOL.   EXERCISE: Patient was educated on all the above exercise prior/during/after for proper posture, positioning, and execution for safe performance with home exercise program.   STRENGTH: Patient educated on the importance of improved core and extremity strength in order to improve alignment of the spine and extremities with static positions and dynamic movement.   GAIT & BALANCE: Patient educated on the importance of strong core and lower extremity musculature in order to improve both static and dynamic balance, improve gait mechanics, reduce fall risk and improve household and community mobility.   POSTURE: Patient educated on postural awareness to reduce stress and maintain optimal alignment of the spine with static positions and dynamic movement     Written Home Exercises Provided: yes.  Exercises were reviewed and Rodolfo was able to demonstrate them prior to the end " of the session.  Rodolfo demonstrated good  understanding of the education provided. See EMR under Patient Instructions for exercises provided during therapy sessions.    Assessment     Patient tolerated treatment well and without complaint. He has attended 6 total PT visits and has made good overall progress with PT, demonstrating improvements in bilateral knee range of motion, lower extremity strength, and overall gait. Slight improvement in patellar mobility noted bilaterally, and he reports decreased overall pain levels with functional activities. Patient is progressing well towards goals but would benefit from continued PT in order to address remaining limitations in bilateral knee range of motion, lower extremity strength, and gait in order to achieve max functional potential.     Rodolfo is progressing well towards his goals.   Patient prognosis is Fair.     Patient will continue to benefit from skilled outpatient physical therapy to address the deficits listed in the problem list box on initial evaluation, provide pt/family education and to maximize patient's level of independence in the home and community environment.     Patient's spiritual, cultural and educational needs considered and pt agreeable to plan of care and goals.     Anticipated Barriers for therapy: co-morbidities, sedentary lifestyle, chronicity of condition, and adherence to treatment plan    Goal:  Short Term Goals:  6 weeks Status  Date Met   PAIN: Pt will report worst pain of 5/10 in order to progress toward max functional ability and improve quality of life. [] Progressing  [x] Met  [] Not Met  7/13/2023   FUNCTION: Patient will demonstrate improved function as indicated by a functional limitation score of less than or equal to 48 out of 100 on FOTO. [] Progressing  [x] Met  [] Not Met  7/13/2023   STRENGTH: Patient will improve strength to 50% of stated goals, listed in objective measures above, in order to progress towards independence  with functional activities. [x] Progressing  [] Met  [] Not Met Partially Met  7/13/2023    POSTURE: Patient will correct postural deviations in sitting and standing, to decrease pain and promote long term stability.  [x] Progressing  [] Met  [] Not Met     GAIT: Patient will demonstrate improved gait mechanics in order to improve functional mobility, improve quality of life, and decrease risk of further injury or fall.  [] Progressing  [x] Met  [] Not Met 7/13/2023    HEP: Patient will demonstrate independence with HEP in order to progress toward functional independence. [] Progressing  [x] Met  [] Not Met 7/13/2023       Long Term Goals:  12 weeks Status Date Met   PAIN: Pt will report worst pain of 3/10 in order to progress toward max functional ability and improve quality of life [x] Progressing  [] Met  [] Not Met     FUNCTION: Patient will demonstrate improved function as indicated by a functional limitation score of less than or equal to 42 out of 100 on FOTO. [] Progressing  [x] Met  [] Not Met  7/13/2023   MOBILITY: Patient will improve AROM to stated goals, listed in objective measures above, in order to return to maximal functional potential and improve quality of life.  [x] Progressing  [] Met  [] Not Met Partially Met  7/13/2023    STRENGTH: Patient will improve strength to stated goals, listed in objective measures above, in order to improve functional independence and quality of life.  [x] Progressing  [] Met  [] Not Met     GAIT: Patient will demonstrate normalized gait mechanics with minimal compensation in order to return to PLOF. [x] Progressing  [] Met  [] Not Met     Patient will return to normal ADL's, IADL's, community involvement, recreational activities, and work-related activities with less than or equal to 2/10 pain and maximal function.  [x] Progressing  [] Met  [] Not Met           Plan     Continue Plan of Care (POC) and progress per patient tolerance. See treatment section for details  on planned progressions next session.    6/15/2023 (evaluation): Outpatient Physical Therapy 2 times weekly for 12 weeks to include any combination of the following interventions: virtual visits, dry needling, modalities, electrical stimulation (IFC, Pre-Mod, Attended with Functional Dry Needling), Aquatic Therapy, Cervical/Lumbar Traction, Gait Training, Manual Therapy, Neuromuscular Re-ed, Patient Education, Self Care, Therapeutic Exercise, and Therapeutic Activites     Irma Garzon, PT

## 2023-07-20 ENCOUNTER — CLINICAL SUPPORT (OUTPATIENT)
Dept: REHABILITATION | Facility: HOSPITAL | Age: 73
End: 2023-07-20
Payer: MEDICARE

## 2023-07-20 DIAGNOSIS — M17.0 PRIMARY OSTEOARTHRITIS OF BOTH KNEES: Primary | ICD-10-CM

## 2023-07-20 DIAGNOSIS — M25.562 CHRONIC PAIN OF BOTH KNEES: ICD-10-CM

## 2023-07-20 DIAGNOSIS — M25.661 DECREASED RANGE OF MOTION OF BOTH KNEES: ICD-10-CM

## 2023-07-20 DIAGNOSIS — G89.29 CHRONIC PAIN OF BOTH KNEES: ICD-10-CM

## 2023-07-20 DIAGNOSIS — R53.1 DECREASED STRENGTH, ENDURANCE, AND MOBILITY: ICD-10-CM

## 2023-07-20 DIAGNOSIS — R68.89 DECREASED STRENGTH, ENDURANCE, AND MOBILITY: ICD-10-CM

## 2023-07-20 DIAGNOSIS — Z74.09 DECREASED STRENGTH, ENDURANCE, AND MOBILITY: ICD-10-CM

## 2023-07-20 DIAGNOSIS — M25.662 DECREASED RANGE OF MOTION OF BOTH KNEES: ICD-10-CM

## 2023-07-20 DIAGNOSIS — M25.561 CHRONIC PAIN OF BOTH KNEES: ICD-10-CM

## 2023-07-20 PROCEDURE — 97530 THERAPEUTIC ACTIVITIES: CPT | Performed by: PHYSICAL THERAPIST

## 2023-07-20 PROCEDURE — 97112 NEUROMUSCULAR REEDUCATION: CPT | Performed by: PHYSICAL THERAPIST

## 2023-07-20 PROCEDURE — 97110 THERAPEUTIC EXERCISES: CPT | Performed by: PHYSICAL THERAPIST

## 2023-07-20 NOTE — PROGRESS NOTES
"[facility]   Physical Therapy Treatment Note        Name: Rodolfo Alvarez  Clinic Number: [id number]    Therapy Diagnosis:   Encounter Diagnoses   Name Primary?    Primary osteoarthritis of both knees Yes    Chronic pain of both knees     Decreased range of motion of both knees     Decreased strength, endurance, and mobility      Physician: Katelynn Azul PA    Visit Date: 7/20/2023    Physician Orders: PT Eval and Treat  Medical Diagnosis from Referral: primary osteoarthritis of both knees  Evaluation Date: 6/15/2023  Authorization Period Expiration: 12/3/2023  Plan of Care Expiration: 9/13/2023  Progress Note Due: 8/12/2023  Visit # / Visits authorized: 6/20 (+eval)   FOTO: 1/3 (last performed on 6/15/2023)     Precautions: Standard and recent humerus fracture    PTA Visit #: 0/5     Time In: 1432  Time Out: 1525  Total Billable Time: 53 minutes (Billing reflects 1 on 1 treatment time spent with patient)    Subjective     Patient reports: feeling pretty good today. His knees are feeling better overall.     He/She was compliant with home exercise program.  Response to previous treatment: no adverse reaction   Functional change: decreasing pain levels, improving LE strength    Pain: 0/10  (left knee today)  Location: bilateral knee pain, L>R    Objective      Objective Measures updated at progress report or POC update only unless otherwise noted.       Treatment     Rodolfo received the treatments listed below:       THERAPEUTIC EXERCISES to develop strength, endurance, ROM, flexibility, posture, and core stabilization for (13) minutes including:    Intervention Performed Today    Nustep for ROM and strength x 6' level 3   Ball squeezes  x 3', 3" holds on and off   Tailgaters (added) x 3'                       Progress Note  See objective section for details      Plan for Next Visit:          NEUROMUSCULAR RE-EDUCATION ACTIVITIES to improve Balance, Coordination, Kinesthetic, Sense, " "Proprioception, and Posture for (19) minutes.  The following were included:    Intervention Performed Today    Quad sets x 3', 5" holds B LE   SAQ  x 3', B LE, 3#   Straight leg raises x 3 x 10, each LE   Sidelying clams x 2 x 10 each LE   LAQ  x 3', B LE, 3#                    Plan for Next Visit:          THERAPEUTIC ACTIVITIES to improve dynamic and functional  performance for (21) minutes including:    Intervention Performed Today    Bridges (progressed) x 3 x 10   Sit to Stands x 2 x 10 (hi-low mat), higher surface without use of UE of assistance    Standing TKE x 3 x 10 each LE, purple band   Step ups  x 1 x 10 each foot, 4" step   Rhomberg Stance (added) x 1 x 30" each foot placement   Tandem Stance (added) x 1 x 30" each foot placement, using UE on and off as needed   Hurdles  Next           Plan for Next Visit: side stepping,         Patient Education and Home Exercises       Home Exercises Provided and Patient Education Provided     Education provided:   PURPOSE: Patient educated on the impairments noted above and the effects of physical therapy intervention to improve overall condition and QOL.   EXERCISE: Patient was educated on all the above exercise prior/during/after for proper posture, positioning, and execution for safe performance with home exercise program.   STRENGTH: Patient educated on the importance of improved core and extremity strength in order to improve alignment of the spine and extremities with static positions and dynamic movement.   GAIT & BALANCE: Patient educated on the importance of strong core and lower extremity musculature in order to improve both static and dynamic balance, improve gait mechanics, reduce fall risk and improve household and community mobility.   POSTURE: Patient educated on postural awareness to reduce stress and maintain optimal alignment of the spine with static positions and dynamic movement     Written Home Exercises Provided: yes.  Exercises were reviewed " and Rodolfo was able to demonstrate them prior to the end of the session.  Rodolfo demonstrated good  understanding of the education provided. See EMR under Patient Instructions for exercises provided during therapy sessions.    Assessment     Patient did well with treatment today. He completed exercises with less difficulty and without complaint; however, verbal and tactile cues still required to maintain proper form. Cues for performing exercises more slow and controlled required as well. Added rhomberg and tandem stance this visit, and patient did well. He would benefit from continued balance activities, as he demonstrated much difficulty with tandem stance. He would benefit from additional gait and balance activities in upcoming visits.     Rodolfo is progressing well towards his goals.   Patient prognosis is Fair.     Patient will continue to benefit from skilled outpatient physical therapy to address the deficits listed in the problem list box on initial evaluation, provide pt/family education and to maximize patient's level of independence in the home and community environment.     Patient's spiritual, cultural and educational needs considered and pt agreeable to plan of care and goals.     Anticipated Barriers for therapy: co-morbidities, sedentary lifestyle, chronicity of condition, and adherence to treatment plan    Goal:  Short Term Goals:  6 weeks Status  Date Met   PAIN: Pt will report worst pain of 5/10 in order to progress toward max functional ability and improve quality of life. [] Progressing  [x] Met  [] Not Met  7/13/2023   FUNCTION: Patient will demonstrate improved function as indicated by a functional limitation score of less than or equal to 48 out of 100 on FOTO. [] Progressing  [x] Met  [] Not Met  7/13/2023   STRENGTH: Patient will improve strength to 50% of stated goals, listed in objective measures above, in order to progress towards independence with functional activities. [x]  Progressing  [] Met  [] Not Met Partially Met  7/13/2023    POSTURE: Patient will correct postural deviations in sitting and standing, to decrease pain and promote long term stability.  [x] Progressing  [] Met  [] Not Met     GAIT: Patient will demonstrate improved gait mechanics in order to improve functional mobility, improve quality of life, and decrease risk of further injury or fall.  [] Progressing  [x] Met  [] Not Met 7/13/2023    HEP: Patient will demonstrate independence with HEP in order to progress toward functional independence. [] Progressing  [x] Met  [] Not Met 7/13/2023       Long Term Goals:  12 weeks Status Date Met   PAIN: Pt will report worst pain of 3/10 in order to progress toward max functional ability and improve quality of life [x] Progressing  [] Met  [] Not Met     FUNCTION: Patient will demonstrate improved function as indicated by a functional limitation score of less than or equal to 42 out of 100 on FOTO. [] Progressing  [x] Met  [] Not Met  7/13/2023   MOBILITY: Patient will improve AROM to stated goals, listed in objective measures above, in order to return to maximal functional potential and improve quality of life.  [x] Progressing  [] Met  [] Not Met Partially Met  7/13/2023    STRENGTH: Patient will improve strength to stated goals, listed in objective measures above, in order to improve functional independence and quality of life.  [x] Progressing  [] Met  [] Not Met     GAIT: Patient will demonstrate normalized gait mechanics with minimal compensation in order to return to PLOF. [x] Progressing  [] Met  [] Not Met     Patient will return to normal ADL's, IADL's, community involvement, recreational activities, and work-related activities with less than or equal to 2/10 pain and maximal function.  [x] Progressing  [] Met  [] Not Met           Plan     Continue Plan of Care (POC) and progress per patient tolerance. See treatment section for details on planned progressions next  session.    6/15/2023 (evaluation): Outpatient Physical Therapy 2 times weekly for 12 weeks to include any combination of the following interventions: virtual visits, dry needling, modalities, electrical stimulation (IFC, Pre-Mod, Attended with Functional Dry Needling), Aquatic Therapy, Cervical/Lumbar Traction, Gait Training, Manual Therapy, Neuromuscular Re-ed, Patient Education, Self Care, Therapeutic Exercise, and Therapeutic Activites     Irma Garzon, PT

## 2023-07-25 ENCOUNTER — CLINICAL SUPPORT (OUTPATIENT)
Dept: REHABILITATION | Facility: HOSPITAL | Age: 73
End: 2023-07-25
Payer: MEDICARE

## 2023-07-25 DIAGNOSIS — Z74.09 DECREASED STRENGTH, ENDURANCE, AND MOBILITY: ICD-10-CM

## 2023-07-25 DIAGNOSIS — M25.562 CHRONIC PAIN OF BOTH KNEES: ICD-10-CM

## 2023-07-25 DIAGNOSIS — M17.0 PRIMARY OSTEOARTHRITIS OF BOTH KNEES: Primary | ICD-10-CM

## 2023-07-25 DIAGNOSIS — R68.89 DECREASED STRENGTH, ENDURANCE, AND MOBILITY: ICD-10-CM

## 2023-07-25 DIAGNOSIS — R53.1 DECREASED STRENGTH, ENDURANCE, AND MOBILITY: ICD-10-CM

## 2023-07-25 DIAGNOSIS — M25.661 DECREASED RANGE OF MOTION OF BOTH KNEES: ICD-10-CM

## 2023-07-25 DIAGNOSIS — M25.561 CHRONIC PAIN OF BOTH KNEES: ICD-10-CM

## 2023-07-25 DIAGNOSIS — G89.29 CHRONIC PAIN OF BOTH KNEES: ICD-10-CM

## 2023-07-25 DIAGNOSIS — M25.662 DECREASED RANGE OF MOTION OF BOTH KNEES: ICD-10-CM

## 2023-07-25 PROCEDURE — 97530 THERAPEUTIC ACTIVITIES: CPT | Performed by: PHYSICAL THERAPIST

## 2023-07-25 PROCEDURE — 97112 NEUROMUSCULAR REEDUCATION: CPT | Performed by: PHYSICAL THERAPIST

## 2023-07-25 PROCEDURE — 97110 THERAPEUTIC EXERCISES: CPT | Performed by: PHYSICAL THERAPIST

## 2023-07-25 NOTE — PROGRESS NOTES
"[facility]   Physical Therapy Treatment Note        Name: Rodolfo Alvarez  Clinic Number: [id number]    Therapy Diagnosis:   Encounter Diagnoses   Name Primary?    Primary osteoarthritis of both knees Yes    Chronic pain of both knees     Decreased range of motion of both knees     Decreased strength, endurance, and mobility      Physician: Katelynn Azul PA    Visit Date: 7/25/2023    Physician Orders: PT Eval and Treat  Medical Diagnosis from Referral: primary osteoarthritis of both knees  Evaluation Date: 6/15/2023  Authorization Period Expiration: 12/3/2023  Plan of Care Expiration: 9/13/2023  Progress Note Due: 8/12/2023  Visit # / Visits authorized: 7/20 (+eval)   FOTO: 1/3 (last performed on 6/15/2023)     Precautions: Standard and recent humerus fracture    PTA Visit #: 0/5     Time In: 1430  Time Out: 1517  Total Billable Time: 46 minutes (Billing reflects 1 on 1 treatment time spent with patient)    Subjective     Patient reports: feeling better overall. His knee pain is not like it was when he started. Patient reports that his back is bothering him a little. He woke up a few days ago with it hurting.     He/She was compliant with home exercise program.  Response to previous treatment: no adverse reaction   Functional change: decreasing pain levels, improving LE strength    Pain: 0/10  (left knee today)  Location: bilateral knee pain, L>R    Objective      Objective Measures updated at progress report or POC update only unless otherwise noted.       Treatment     Rodolfo received the treatments listed below:       THERAPEUTIC EXERCISES to develop strength, endurance, ROM, flexibility, posture, and core stabilization for (13) minutes including:    Intervention Performed Today    Nustep for ROM and strength x 6' level 3   Ball squeezes  x 3', 3" holds on and off   Tailgaters  x 3'                       Progress Note  See objective section for details      Plan for Next Visit:  " "        NEUROMUSCULAR RE-EDUCATION ACTIVITIES to improve Balance, Coordination, Kinesthetic, Sense, Proprioception, and Posture for (12) minutes.  The following were included:    Intervention Performed Today    Quad sets x 3', 5" holds B LE   SAQ  x 3', B LE, 4#   Straight leg raises  3 x 10, each LE   Sidelying clams  2 x 10 each LE   LAQ  x 3', B LE, 4#                    Plan for Next Visit:          THERAPEUTIC ACTIVITIES to improve dynamic and functional  performance for (21) minutes including:    Intervention Performed Today    Bridges   3 x 10   Sit to Stands x 2 x 10 (hi-low mat), higher surface without use of UE of assistance    Standing TKE x 3 x 10 each LE, purple band   Step ups (progressed) x 1 x 10 each foot, 6" step   Rhomberg Stance  x 1 x 30" each foot placement   Tandem Stance  x 1 x 30" each foot placement, using UE on and off as needed   Hurdles (added) x Down and back 3x in II bars, small hurdles, using 1-2 UE assist on and off as needed   Side stepping (added) x Down and back 3x in II bars     Plan for Next Visit: side stepping,         Patient Education and Home Exercises       Home Exercises Provided and Patient Education Provided     Education provided:   PURPOSE: Patient educated on the impairments noted above and the effects of physical therapy intervention to improve overall condition and QOL.   EXERCISE: Patient was educated on all the above exercise prior/during/after for proper posture, positioning, and execution for safe performance with home exercise program.   STRENGTH: Patient educated on the importance of improved core and extremity strength in order to improve alignment of the spine and extremities with static positions and dynamic movement.   GAIT & BALANCE: Patient educated on the importance of strong core and lower extremity musculature in order to improve both static and dynamic balance, improve gait mechanics, reduce fall risk and improve household and community mobility. "   POSTURE: Patient educated on postural awareness to reduce stress and maintain optimal alignment of the spine with static positions and dynamic movement     Written Home Exercises Provided: yes.  Exercises were reviewed and Rodolfo was able to demonstrate them prior to the end of the session.  Rodolfo demonstrated good  understanding of the education provided. See EMR under Patient Instructions for exercises provided during therapy sessions.    Assessment     Patient tolerated treatment well, despite low back pain. Progressed to more standing, balance activities without issue. Patient required 1-2 hands for balance during hurdles to prevent LOB. However, he started to demonstrate better balance and step length after first repetition. He would benefit from continued LE strength, gait training, and balance activities.     Rodolfo is progressing well towards his goals.   Patient prognosis is Fair.     Patient will continue to benefit from skilled outpatient physical therapy to address the deficits listed in the problem list box on initial evaluation, provide pt/family education and to maximize patient's level of independence in the home and community environment.     Patient's spiritual, cultural and educational needs considered and pt agreeable to plan of care and goals.     Anticipated Barriers for therapy: co-morbidities, sedentary lifestyle, chronicity of condition, and adherence to treatment plan    Goal:  Short Term Goals:  6 weeks Status  Date Met   PAIN: Pt will report worst pain of 5/10 in order to progress toward max functional ability and improve quality of life. [] Progressing  [x] Met  [] Not Met  7/13/2023   FUNCTION: Patient will demonstrate improved function as indicated by a functional limitation score of less than or equal to 48 out of 100 on FOTO. [] Progressing  [x] Met  [] Not Met  7/13/2023   STRENGTH: Patient will improve strength to 50% of stated goals, listed in objective measures above, in order  to progress towards independence with functional activities. [x] Progressing  [] Met  [] Not Met Partially Met  7/13/2023    POSTURE: Patient will correct postural deviations in sitting and standing, to decrease pain and promote long term stability.  [x] Progressing  [] Met  [] Not Met     GAIT: Patient will demonstrate improved gait mechanics in order to improve functional mobility, improve quality of life, and decrease risk of further injury or fall.  [] Progressing  [x] Met  [] Not Met 7/13/2023    HEP: Patient will demonstrate independence with HEP in order to progress toward functional independence. [] Progressing  [x] Met  [] Not Met 7/13/2023       Long Term Goals:  12 weeks Status Date Met   PAIN: Pt will report worst pain of 3/10 in order to progress toward max functional ability and improve quality of life [x] Progressing  [] Met  [] Not Met     FUNCTION: Patient will demonstrate improved function as indicated by a functional limitation score of less than or equal to 42 out of 100 on FOTO. [] Progressing  [x] Met  [] Not Met  7/13/2023   MOBILITY: Patient will improve AROM to stated goals, listed in objective measures above, in order to return to maximal functional potential and improve quality of life.  [x] Progressing  [] Met  [] Not Met Partially Met  7/13/2023    STRENGTH: Patient will improve strength to stated goals, listed in objective measures above, in order to improve functional independence and quality of life.  [x] Progressing  [] Met  [] Not Met     GAIT: Patient will demonstrate normalized gait mechanics with minimal compensation in order to return to PLOF. [x] Progressing  [] Met  [] Not Met     Patient will return to normal ADL's, IADL's, community involvement, recreational activities, and work-related activities with less than or equal to 2/10 pain and maximal function.  [x] Progressing  [] Met  [] Not Met           Plan     Continue Plan of Care (POC) and progress per patient tolerance.  See treatment section for details on planned progressions next session.    6/15/2023 (evaluation): Outpatient Physical Therapy 2 times weekly for 12 weeks to include any combination of the following interventions: virtual visits, dry needling, modalities, electrical stimulation (IFC, Pre-Mod, Attended with Functional Dry Needling), Aquatic Therapy, Cervical/Lumbar Traction, Gait Training, Manual Therapy, Neuromuscular Re-ed, Patient Education, Self Care, Therapeutic Exercise, and Therapeutic Activites     Irma Garzon, PT

## 2023-08-01 ENCOUNTER — CLINICAL SUPPORT (OUTPATIENT)
Dept: REHABILITATION | Facility: HOSPITAL | Age: 73
End: 2023-08-01
Payer: MEDICARE

## 2023-08-01 DIAGNOSIS — M25.662 DECREASED RANGE OF MOTION OF BOTH KNEES: ICD-10-CM

## 2023-08-01 DIAGNOSIS — M25.562 CHRONIC PAIN OF BOTH KNEES: ICD-10-CM

## 2023-08-01 DIAGNOSIS — Z74.09 DECREASED STRENGTH, ENDURANCE, AND MOBILITY: ICD-10-CM

## 2023-08-01 DIAGNOSIS — M17.0 PRIMARY OSTEOARTHRITIS OF BOTH KNEES: Primary | ICD-10-CM

## 2023-08-01 DIAGNOSIS — R68.89 DECREASED STRENGTH, ENDURANCE, AND MOBILITY: ICD-10-CM

## 2023-08-01 DIAGNOSIS — M25.561 CHRONIC PAIN OF BOTH KNEES: ICD-10-CM

## 2023-08-01 DIAGNOSIS — M25.661 DECREASED RANGE OF MOTION OF BOTH KNEES: ICD-10-CM

## 2023-08-01 DIAGNOSIS — R53.1 DECREASED STRENGTH, ENDURANCE, AND MOBILITY: ICD-10-CM

## 2023-08-01 DIAGNOSIS — G89.29 CHRONIC PAIN OF BOTH KNEES: ICD-10-CM

## 2023-08-01 PROCEDURE — 97110 THERAPEUTIC EXERCISES: CPT | Performed by: PHYSICAL THERAPIST

## 2023-08-01 PROCEDURE — 97530 THERAPEUTIC ACTIVITIES: CPT | Performed by: PHYSICAL THERAPIST

## 2023-08-01 PROCEDURE — 97112 NEUROMUSCULAR REEDUCATION: CPT | Performed by: PHYSICAL THERAPIST

## 2023-08-01 NOTE — PROGRESS NOTES
"[facility]   Physical Therapy Treatment Note        Name: Rodolfo Alvarez  Clinic Number: [id number]    Therapy Diagnosis:   Encounter Diagnoses   Name Primary?    Primary osteoarthritis of both knees Yes    Chronic pain of both knees     Decreased range of motion of both knees     Decreased strength, endurance, and mobility      Physician: Katleynn Azul PA    Visit Date: 8/1/2023    Physician Orders: PT Eval and Treat  Medical Diagnosis from Referral: primary osteoarthritis of both knees  Evaluation Date: 6/15/2023  Authorization Period Expiration: 12/3/2023  Plan of Care Expiration: 9/13/2023  Progress Note Due: 8/12/2023  Visit # / Visits authorized: 8/20 (+eval)   FOTO: 1/3 (last performed on 6/15/2023)     Precautions: Standard and recent humerus fracture    PTA Visit #: 0/5     Time In: 1517  Time Out: 1610  Total Billable Time: 53 minutes (Billing reflects 1 on 1 treatment time spent with patient)    Subjective     Patient reports: his knees are feeling okay today, and he is still feeling better overall.     He/She was compliant with home exercise program.  Response to previous treatment: no adverse reaction   Functional change: decreasing pain levels, improving LE strength    Pain: 0/10  (left knee today)  Location: bilateral knee pain, L>R    Objective      Objective Measures updated at progress report or POC update only unless otherwise noted.       Treatment     Rodolfo received the treatments listed below:       THERAPEUTIC EXERCISES to develop strength, endurance, ROM, flexibility, posture, and core stabilization for (14) minutes including:    Intervention Performed Today    Nustep for ROM and strength x 6.5' level 3   Ball squeezes  x 3', 3" holds on and off   Tailgaters  x 3'                       Progress Note  See objective section for details      Plan for Next Visit:          NEUROMUSCULAR RE-EDUCATION ACTIVITIES to improve Balance, Coordination, Kinesthetic, Sense, " "Proprioception, and Posture for (17) minutes.  The following were included:    Intervention Performed Today    Quad sets x 3', 5" holds B LE   SAQ  x 3', B LE, 4#   Straight leg raises x 3 x 10, each LE   Sidelying clams  2 x 10 each LE   LAQ  x 3', B LE, 4#                    Plan for Next Visit:          THERAPEUTIC ACTIVITIES to improve dynamic and functional  performance for (22) minutes including:    Intervention Performed Today    Bridges  x 3 x 10   Sit to Stands x 3 x 10 (hi-low mat), higher surface without use of UE of assistance    Standing TKE  3 x 10 each LE, purple band   Step ups  x 1 x 10 each foot, 6" step   Rhomberg Stance  x 1 x 30" each foot placement   Tandem Stance  x 1 x 30" each foot placement, using UE on and off as needed   Hurdles  x Down and back 3x in II bars, small hurdles, using 1-2 UE assist on and off as needed   Side stepping over hurdles (small) (added) x Down and back 3x in II bars     Plan for Next Visit: side stepping,         Patient Education and Home Exercises       Home Exercises Provided and Patient Education Provided     Education provided:   PURPOSE: Patient educated on the impairments noted above and the effects of physical therapy intervention to improve overall condition and QOL.   EXERCISE: Patient was educated on all the above exercise prior/during/after for proper posture, positioning, and execution for safe performance with home exercise program.   STRENGTH: Patient educated on the importance of improved core and extremity strength in order to improve alignment of the spine and extremities with static positions and dynamic movement.   GAIT & BALANCE: Patient educated on the importance of strong core and lower extremity musculature in order to improve both static and dynamic balance, improve gait mechanics, reduce fall risk and improve household and community mobility.   POSTURE: Patient educated on postural awareness to reduce stress and maintain optimal alignment " of the spine with static positions and dynamic movement     Written Home Exercises Provided: yes.  Exercises were reviewed and Rodolfo was able to demonstrate them prior to the end of the session.  Rodolfo demonstrated good  understanding of the education provided. See EMR under Patient Instructions for exercises provided during therapy sessions.    Assessment     Patient did well with treatment today and completed exercises without complaint. He was able to be progressed with side stepping over hurdles today but did require B UE support during this activity. Improvement noted with step ups today, with less cues required. He was able to progress sit to stand repetitions as well. Patient's overall strength and functional mobility continue to improve overall as compared to initial visits     Rodolfo is progressing well towards his goals.   Patient prognosis is Fair.     Patient will continue to benefit from skilled outpatient physical therapy to address the deficits listed in the problem list box on initial evaluation, provide pt/family education and to maximize patient's level of independence in the home and community environment.     Patient's spiritual, cultural and educational needs considered and pt agreeable to plan of care and goals.     Anticipated Barriers for therapy: co-morbidities, sedentary lifestyle, chronicity of condition, and adherence to treatment plan    Goal:  Short Term Goals:  6 weeks Status  Date Met   PAIN: Pt will report worst pain of 5/10 in order to progress toward max functional ability and improve quality of life. [] Progressing  [x] Met  [] Not Met  7/13/2023   FUNCTION: Patient will demonstrate improved function as indicated by a functional limitation score of less than or equal to 48 out of 100 on FOTO. [] Progressing  [x] Met  [] Not Met  7/13/2023   STRENGTH: Patient will improve strength to 50% of stated goals, listed in objective measures above, in order to progress towards  independence with functional activities. [x] Progressing  [] Met  [] Not Met Partially Met  7/13/2023    POSTURE: Patient will correct postural deviations in sitting and standing, to decrease pain and promote long term stability.  [x] Progressing  [] Met  [] Not Met     GAIT: Patient will demonstrate improved gait mechanics in order to improve functional mobility, improve quality of life, and decrease risk of further injury or fall.  [] Progressing  [x] Met  [] Not Met 7/13/2023    HEP: Patient will demonstrate independence with HEP in order to progress toward functional independence. [] Progressing  [x] Met  [] Not Met 7/13/2023       Long Term Goals:  12 weeks Status Date Met   PAIN: Pt will report worst pain of 3/10 in order to progress toward max functional ability and improve quality of life [x] Progressing  [] Met  [] Not Met     FUNCTION: Patient will demonstrate improved function as indicated by a functional limitation score of less than or equal to 42 out of 100 on FOTO. [] Progressing  [x] Met  [] Not Met  7/13/2023   MOBILITY: Patient will improve AROM to stated goals, listed in objective measures above, in order to return to maximal functional potential and improve quality of life.  [x] Progressing  [] Met  [] Not Met Partially Met  7/13/2023    STRENGTH: Patient will improve strength to stated goals, listed in objective measures above, in order to improve functional independence and quality of life.  [x] Progressing  [] Met  [] Not Met     GAIT: Patient will demonstrate normalized gait mechanics with minimal compensation in order to return to PLOF. [x] Progressing  [] Met  [] Not Met     Patient will return to normal ADL's, IADL's, community involvement, recreational activities, and work-related activities with less than or equal to 2/10 pain and maximal function.  [x] Progressing  [] Met  [] Not Met           Plan     Continue Plan of Care (POC) and progress per patient tolerance. See treatment section  for details on planned progressions next session.    6/15/2023 (evaluation): Outpatient Physical Therapy 2 times weekly for 12 weeks to include any combination of the following interventions: virtual visits, dry needling, modalities, electrical stimulation (IFC, Pre-Mod, Attended with Functional Dry Needling), Aquatic Therapy, Cervical/Lumbar Traction, Gait Training, Manual Therapy, Neuromuscular Re-ed, Patient Education, Self Care, Therapeutic Exercise, and Therapeutic Activites     Irma Garzon, PT

## 2023-08-08 ENCOUNTER — CLINICAL SUPPORT (OUTPATIENT)
Dept: REHABILITATION | Facility: HOSPITAL | Age: 73
End: 2023-08-08
Payer: MEDICARE

## 2023-08-08 DIAGNOSIS — M25.561 CHRONIC PAIN OF BOTH KNEES: ICD-10-CM

## 2023-08-08 DIAGNOSIS — M25.562 CHRONIC PAIN OF BOTH KNEES: ICD-10-CM

## 2023-08-08 DIAGNOSIS — Z74.09 DECREASED STRENGTH, ENDURANCE, AND MOBILITY: ICD-10-CM

## 2023-08-08 DIAGNOSIS — R68.89 DECREASED STRENGTH, ENDURANCE, AND MOBILITY: ICD-10-CM

## 2023-08-08 DIAGNOSIS — M25.661 DECREASED RANGE OF MOTION OF BOTH KNEES: ICD-10-CM

## 2023-08-08 DIAGNOSIS — M17.0 PRIMARY OSTEOARTHRITIS OF BOTH KNEES: Primary | ICD-10-CM

## 2023-08-08 DIAGNOSIS — G89.29 CHRONIC PAIN OF BOTH KNEES: ICD-10-CM

## 2023-08-08 DIAGNOSIS — M25.662 DECREASED RANGE OF MOTION OF BOTH KNEES: ICD-10-CM

## 2023-08-08 DIAGNOSIS — R53.1 DECREASED STRENGTH, ENDURANCE, AND MOBILITY: ICD-10-CM

## 2023-08-08 PROCEDURE — 97110 THERAPEUTIC EXERCISES: CPT | Performed by: PHYSICAL THERAPIST

## 2023-08-08 PROCEDURE — 97530 THERAPEUTIC ACTIVITIES: CPT | Performed by: PHYSICAL THERAPIST

## 2023-08-08 PROCEDURE — 97112 NEUROMUSCULAR REEDUCATION: CPT | Performed by: PHYSICAL THERAPIST

## 2023-08-08 NOTE — PROGRESS NOTES
"[facility]   Physical Therapy Treatment Note        Name: Rodolfo Alvarez  Clinic Number: [id number]    Therapy Diagnosis:   Encounter Diagnoses   Name Primary?    Primary osteoarthritis of both knees Yes    Chronic pain of both knees     Decreased range of motion of both knees     Decreased strength, endurance, and mobility      Physician: Katelynn Azul PA    Visit Date: 8/8/2023    Physician Orders: PT Eval and Treat  Medical Diagnosis from Referral: primary osteoarthritis of both knees  Evaluation Date: 6/15/2023  Authorization Period Expiration: 12/3/2023  Plan of Care Expiration: 9/13/2023  Progress Note Due: 8/12/2023  Visit # / Visits authorized: 9/20 (+eval)   FOTO: 1/3 (last performed on 6/15/2023)     Precautions: Standard and recent humerus fracture    PTA Visit #: 0/5     Time In: 1435  Time Out: 1515  Total Billable Time: 40 minutes (Billing reflects 1 on 1 treatment time spent with patient)    Subjective     Patient reports: no new complaints. He states that he is still doing well overall, and he feels confident in making next visit his last.     He/She was compliant with home exercise program.  Response to previous treatment: no adverse reaction   Functional change: decreasing pain levels, improving LE strength    Pain: 2/10  (left knee today)  Location: bilateral knee pain, L>R    Objective      Objective Measures updated at progress report or POC update only unless otherwise noted.       Treatment     Rodolfo received the treatments listed below:       THERAPEUTIC EXERCISES to develop strength, endurance, ROM, flexibility, posture, and core stabilization for (6) minutes including:    Intervention Performed Today    Nustep for ROM and strength x 6' level 3   Ball squeezes   3', 3" holds on and off   Tailgaters   3'                       Progress Note  See objective section for details      Plan for Next Visit:          NEUROMUSCULAR RE-EDUCATION ACTIVITIES to improve " "Balance, Coordination, Kinesthetic, Sense, Proprioception, and Posture for (17) minutes.  The following were included:    Intervention Performed Today    Quad sets x 3', 5" holds B LE   SAQ  x 3', B LE, 4#   Straight leg raises x 3 x 10, each LE   Sidelying clams  2 x 10 each LE   LAQ  x 3', B LE, 4#                    Plan for Next Visit:          THERAPEUTIC ACTIVITIES to improve dynamic and functional  performance for (17) minutes including:    Intervention Performed Today    Bridges   3 x 10   Sit to Stands x 3 x 10 (hi-low mat), higher surface without use of UE of assistance    Standing TKE  3 x 10 each LE, purple band   Step ups  x 1 x 10 each foot, 6" step   Rhomberg Stance  x 1 x 30" each foot placement   Tandem Stance  x 1 x 30" each foot placement, using UE on and off as needed   Hurdles  x Down and back 3x in II bars, small hurdles, using 1-2 UE assist on and off as needed   Side stepping over hurdles (small)  x Down and back 3x in II bars     Plan for Next Visit: side stepping,         Patient Education and Home Exercises       Home Exercises Provided and Patient Education Provided     Education provided:   PURPOSE: Patient educated on the impairments noted above and the effects of physical therapy intervention to improve overall condition and QOL.   EXERCISE: Patient was educated on all the above exercise prior/during/after for proper posture, positioning, and execution for safe performance with home exercise program.   STRENGTH: Patient educated on the importance of improved core and extremity strength in order to improve alignment of the spine and extremities with static positions and dynamic movement.   GAIT & BALANCE: Patient educated on the importance of strong core and lower extremity musculature in order to improve both static and dynamic balance, improve gait mechanics, reduce fall risk and improve household and community mobility.   POSTURE: Patient educated on postural awareness to reduce " stress and maintain optimal alignment of the spine with static positions and dynamic movement     Written Home Exercises Provided: yes.  Exercises were reviewed and Rodolfo was able to demonstrate them prior to the end of the session.  Rodolfo demonstrated good  understanding of the education provided. See EMR under Patient Instructions for exercises provided during therapy sessions.    Assessment     Patient tolerated treatment very well today. He demonstrated much less difficulty with standing activities, as well as improved overall balance. Patient demonstrates improved LE strength and knee range of motion as well. He has returned to more functional activities with more confidence and less limitation. He is appropriate to finish next visit and then transition to an independent HEP.     Rodolfo is progressing well towards his goals.   Patient prognosis is Fair.     Patient will continue to benefit from skilled outpatient physical therapy to address the deficits listed in the problem list box on initial evaluation, provide pt/family education and to maximize patient's level of independence in the home and community environment.     Patient's spiritual, cultural and educational needs considered and pt agreeable to plan of care and goals.     Anticipated Barriers for therapy: co-morbidities, sedentary lifestyle, chronicity of condition, and adherence to treatment plan    Goal:  Short Term Goals:  6 weeks Status  Date Met   PAIN: Pt will report worst pain of 5/10 in order to progress toward max functional ability and improve quality of life. [] Progressing  [x] Met  [] Not Met  7/13/2023   FUNCTION: Patient will demonstrate improved function as indicated by a functional limitation score of less than or equal to 48 out of 100 on FOTO. [] Progressing  [x] Met  [] Not Met  7/13/2023   STRENGTH: Patient will improve strength to 50% of stated goals, listed in objective measures above, in order to progress towards independence  with functional activities. [x] Progressing  [] Met  [] Not Met Partially Met  7/13/2023    POSTURE: Patient will correct postural deviations in sitting and standing, to decrease pain and promote long term stability.  [x] Progressing  [] Met  [] Not Met     GAIT: Patient will demonstrate improved gait mechanics in order to improve functional mobility, improve quality of life, and decrease risk of further injury or fall.  [] Progressing  [x] Met  [] Not Met 7/13/2023    HEP: Patient will demonstrate independence with HEP in order to progress toward functional independence. [] Progressing  [x] Met  [] Not Met 7/13/2023       Long Term Goals:  12 weeks Status Date Met   PAIN: Pt will report worst pain of 3/10 in order to progress toward max functional ability and improve quality of life [x] Progressing  [] Met  [] Not Met     FUNCTION: Patient will demonstrate improved function as indicated by a functional limitation score of less than or equal to 42 out of 100 on FOTO. [] Progressing  [x] Met  [] Not Met  7/13/2023   MOBILITY: Patient will improve AROM to stated goals, listed in objective measures above, in order to return to maximal functional potential and improve quality of life.  [x] Progressing  [] Met  [] Not Met Partially Met  7/13/2023    STRENGTH: Patient will improve strength to stated goals, listed in objective measures above, in order to improve functional independence and quality of life.  [x] Progressing  [] Met  [] Not Met     GAIT: Patient will demonstrate normalized gait mechanics with minimal compensation in order to return to PLOF. [x] Progressing  [] Met  [] Not Met     Patient will return to normal ADL's, IADL's, community involvement, recreational activities, and work-related activities with less than or equal to 2/10 pain and maximal function.  [x] Progressing  [] Met  [] Not Met           Plan     Continue Plan of Care (POC) and progress per patient tolerance. See treatment section for details  on planned progressions next session.    6/15/2023 (evaluation): Outpatient Physical Therapy 2 times weekly for 12 weeks to include any combination of the following interventions: virtual visits, dry needling, modalities, electrical stimulation (IFC, Pre-Mod, Attended with Functional Dry Needling), Aquatic Therapy, Cervical/Lumbar Traction, Gait Training, Manual Therapy, Neuromuscular Re-ed, Patient Education, Self Care, Therapeutic Exercise, and Therapeutic Activites     Irma Garzon, PT

## 2023-08-10 ENCOUNTER — CLINICAL SUPPORT (OUTPATIENT)
Dept: REHABILITATION | Facility: HOSPITAL | Age: 73
End: 2023-08-10
Payer: MEDICARE

## 2023-08-10 DIAGNOSIS — M25.662 DECREASED RANGE OF MOTION OF BOTH KNEES: ICD-10-CM

## 2023-08-10 DIAGNOSIS — G89.29 CHRONIC PAIN OF BOTH KNEES: ICD-10-CM

## 2023-08-10 DIAGNOSIS — M17.0 PRIMARY OSTEOARTHRITIS OF BOTH KNEES: Primary | ICD-10-CM

## 2023-08-10 DIAGNOSIS — M25.562 CHRONIC PAIN OF BOTH KNEES: ICD-10-CM

## 2023-08-10 DIAGNOSIS — Z74.09 DECREASED STRENGTH, ENDURANCE, AND MOBILITY: ICD-10-CM

## 2023-08-10 DIAGNOSIS — M25.561 CHRONIC PAIN OF BOTH KNEES: ICD-10-CM

## 2023-08-10 DIAGNOSIS — M25.661 DECREASED RANGE OF MOTION OF BOTH KNEES: ICD-10-CM

## 2023-08-10 DIAGNOSIS — R53.1 DECREASED STRENGTH, ENDURANCE, AND MOBILITY: ICD-10-CM

## 2023-08-10 DIAGNOSIS — R68.89 DECREASED STRENGTH, ENDURANCE, AND MOBILITY: ICD-10-CM

## 2023-08-10 PROCEDURE — 97110 THERAPEUTIC EXERCISES: CPT | Performed by: PHYSICAL THERAPIST

## 2023-08-10 PROCEDURE — 97112 NEUROMUSCULAR REEDUCATION: CPT | Performed by: PHYSICAL THERAPIST

## 2023-08-10 PROCEDURE — 97530 THERAPEUTIC ACTIVITIES: CPT | Performed by: PHYSICAL THERAPIST

## 2023-08-10 NOTE — PLAN OF CARE
[facility]   Physical Therapy Treatment Note + Discharge Summary       Name: Rodolfo Alvarez  Clinic Number: [id number]    Therapy Diagnosis:   Encounter Diagnoses   Name Primary?    Primary osteoarthritis of both knees Yes    Chronic pain of both knees     Decreased range of motion of both knees     Decreased strength, endurance, and mobility      Physician: Katelynn Azul PA    Visit Date: 8/10/2023    Physician Orders: PT Eval and Treat  Medical Diagnosis from Referral: primary osteoarthritis of both knees  Evaluation Date: 6/15/2023  Authorization Period Expiration: 12/3/2023  Plan of Care Expiration: 9/13/2023  Progress Note Due: 8/12/2023  Visit # / Visits authorized: 10/20 (+eval)   FOTO: 1/3 (last performed on 6/15/2023)     Precautions: Standard and recent humerus fracture    PTA Visit #: 0/5     Time In: 1430  Time Out: 1515  Total Billable Time: 45 minutes (Billing reflects 1 on 1 treatment time spent with patient)    Subjective     Patient reports: feeling pretty good today. He feels his knee range of motion and strength have improved since beginning PT. He is satisfied with his progress and would like to make today his last day.     He/She was compliant with home exercise program.  Response to previous treatment: no adverse reaction   Functional change: decreasing pain levels, improving LE strength    Pain: 1-2/10  (left knee today)  Location: bilateral knee pain, L>R    Objective      Objective Measures updated at progress report or POC update only unless otherwise noted.     RANGE OF MOTION: **numbers in () are from initial evaluation)  Knee AROM/PROM Right Left Pain/Dysfunction with Movement Goal   Knee Flexion (135º) 130  (124) 130  (130) No pain with ROM measurements today 130   Knee Extension (0º) -4  (-12) -1  (-6)   0         STRENGTH:   L/E MMT Right Left Pain/Dysfunction with Movement Goal Right   8/10/2023  Left  8/10/2023    Hip Flexion  4+/5 4+/5 No pain with  MMT's this visit 4+/5 B 4+ 4+   Hip Extension  3+/5 3+/5   4+/5 B 4 4   Hip Abduction  3+/5 3+/5   4+/5 B 4- 4-   Knee Extension 4+/5 4+/5   5/5 B 4+ 4+   Knee Flexion 4+/5 4+/5   5/5 B 5 5   Hip IR 4-/5 4-/5   4+/5 B 4 4   Hip ER 4-/5 4-/5   4+/5 B 4 4   Ankle DF 4+/5 4+/5   5/5 B 5 5   Ankle PF 4+/5 4+/5   5/5 B 5 5         MUSCLE LENGTH:   Muscle Tested  Right Left  Goal   Hip Flexors [] Normal  [x] Limited [] Normal  [x] Limited Normal B   ITB / TFL [] Normal  [x] Limited [] Normal  [x] Limited Normal B   Quadriceps [] Normal  [x] Limited [] Normal  [x] Limited Normal B   Hamstrings  [] Normal  [] Limited [] Normal  [x] Limited Normal B   Gastrocnemius  [] Normal  [x] Limited [] Normal  [x] Limited Normal B   Soleus  [] Normal  [x] Limited [] Normal  [x] Limited Normal B    **Although still decreased, muscle length has improved as compared to initial evaluation.      JOINT MOBILITY:   Joint Motion Right Mobility Left Mobility Goal   Distal Femur AP [x] Hypo     [] Normal     [] Hyper [x] Hypo     [] Normal     [] Hyper Normal    Proximal Tibia AP [x] Hypo     [] Normal     [] Hyper [x] Hypo     [] Normal     [] Hyper Normal    Patellar Medial Glide  [] Hypo     [x] Normal     [] Hyper [] Hypo     [x] Normal     [] Hyper Normal    Patellar Lateral Glide  [] Hypo     [x] Normal     [] Hyper [] Hypo     [x] Normal     [] Hyper Normal    Patellar Superior Glide  [] Hypo     [x] Normal     [] Hyper [] Hypo     [x] Normal     [] Hyper Normal    Patellar Inferior Glide  [] Hypo     [x] Normal     [] Hyper [] Hypo     [x] Normal     [] Hyper Normal     **although still hypomobile, joint mobility has improved as compared to initial visits.      SPECIAL TESTS:     Right  (spine) Left  Goal   Varus Stress Test [] Positive    [x] Negative [] Positive    [x] Negative Negative B    Valgus Stress Test [] Positive    [x] Negative [] Positive    [x] Negative Negative B          SENSATION  [x] Intact to Light Touch                 "       [] Impaired:        PALPATION: Muscles: Increased tone and tenderness to palpation of: bilateral quadriceps, hamstrings, hip adductors, pes anserine, gastrocnemius, but decreased as compared to previous visits.         POSTURE:  Pt presents with postural abnormalities which include:               [x] Forward Head                                [] Increased Lumbar Lordosis              [x] Rounded Shoulder                         [] Genu Recurvatum              [] Increased Thoracic Kyphosis        [x] Genu Valgus - L LE              [] Trunk Deviated                              [] Pes Planus              [] Scapular Winging                          [x] Other: Genu Varus R LE        GAIT ANALYSIS The patient ambulated with the following assistive device: straight cane; the pt presents with the following gait abnormalities: bradykinetic, increased ALVARADO, decreased step length bilateral, decreased knee flexion on right, and decreased knee extension bilateral  8/10/2023: Patient still presents ambulating with straight cane. He now demonstrates improvement in mannie and step length. His right knee flexion and bilateral knee extension have improved as well.         FUNCTIONAL MOVEMENT PATTERNS  Movement Analysis Notes   Bed Mobility  []Functional  [x]Dysfunctional:  []Painful  []Non-Painful    Independent but core strength still limited   Sit to Stand [x]Functional  []Dysfunctional:  []Painful  [x]Non-Painful    Equal weight bearing, no UE support         Function:       Treatment     Rodolfo received the treatments listed below:       THERAPEUTIC EXERCISES to develop strength, endurance, ROM, flexibility, posture, and core stabilization for (16) minutes including:    Intervention Performed Today    Nustep for ROM and strength  6' level 3   Recumbent bike for ROM and strength x 6' level 1   Ball squeezes   3', 3" holds on and off   Tailgaters   3'                       Final Re-assessment x See objective section for " "details      Plan for Next Visit:          NEUROMUSCULAR RE-EDUCATION ACTIVITIES to improve Balance, Coordination, Kinesthetic, Sense, Proprioception, and Posture for (17) minutes.  The following were included:    Intervention Performed Today    Quad sets x 3', 5" holds B LE   SAQ  x 3', B LE, 4#   Straight leg raises x 3 x 10, each LE   Sidelying clams  2 x 10 each LE   LAQ  x 3', B LE, 4#                    Plan for Next Visit:          THERAPEUTIC ACTIVITIES to improve dynamic and functional  performance for (12) minutes including:    Intervention Performed Today    Bridges   3 x 10   Sit to Stands x 3 x 10 (hi-low mat), higher surface without use of UE of assistance    Standing TKE  3 x 10 each LE, purple band   Step ups  x 1 x 10 each foot, 6" step   Rhomberg Stance  x 1 x 30" each foot placement   Tandem Stance  x 1 x 30" each foot placement, using UE on and off as needed   Hurdles   Down and back 3x in II bars, small hurdles, using 1-2 UE assist on and off as needed   Side stepping over hurdles (small)   Down and back 3x in II bars     Plan for Next Visit: side stepping,         Patient Education and Home Exercises       Home Exercises Provided and Patient Education Provided     Education provided:   PURPOSE: Patient educated on the impairments noted above and the effects of physical therapy intervention to improve overall condition and QOL.   EXERCISE: Patient was educated on all the above exercise prior/during/after for proper posture, positioning, and execution for safe performance with home exercise program.   STRENGTH: Patient educated on the importance of improved core and extremity strength in order to improve alignment of the spine and extremities with static positions and dynamic movement.   GAIT & BALANCE: Patient educated on the importance of strong core and lower extremity musculature in order to improve both static and dynamic balance, improve gait mechanics, reduce fall risk and improve " household and community mobility.   POSTURE: Patient educated on postural awareness to reduce stress and maintain optimal alignment of the spine with static positions and dynamic movement     Written Home Exercises Provided: yes.  Exercises were reviewed and Rodolfo was able to demonstrate them prior to the end of the session.  Rodolfo demonstrated good  understanding of the education provided. See EMR under Patient Instructions for exercises provided during therapy sessions.    Assessment     Patient tolerated treatment well and has attended 11 total PT visits. He has made good overall progress with PT, demonstrating improvement in bilateral knee range of motion, lower extremity strength, and overall gait. His balance as also especially improved in recent visits with balance activities in the clinic. Patient reports decreased pain levels and improved overall functional ability with less limitation. He is independent with exercises and appropriate for DC with HEP at this time.     Rodolfo is progressing well towards his goals.   Patient prognosis is Fair.     Patient will continue to benefit from skilled outpatient physical therapy to address the deficits listed in the problem list box on initial evaluation, provide pt/family education and to maximize patient's level of independence in the home and community environment.     Patient's spiritual, cultural and educational needs considered and pt agreeable to plan of care and goals.     Anticipated Barriers for therapy: co-morbidities, sedentary lifestyle, chronicity of condition, and adherence to treatment plan    Goal:  Short Term Goals:  6 weeks Status  Date Met   PAIN: Pt will report worst pain of 5/10 in order to progress toward max functional ability and improve quality of life. [] Progressing  [x] Met  [] Not Met  7/13/2023   FUNCTION: Patient will demonstrate improved function as indicated by a functional limitation score of less than or equal to 48 out of 100 on  FOTO. [] Progressing  [x] Met  [] Not Met  7/13/2023   STRENGTH: Patient will improve strength to 50% of stated goals, listed in objective measures above, in order to progress towards independence with functional activities. [] Progressing  [x] Met  [] Not Met 8/10/2023   POSTURE: Patient will correct postural deviations in sitting and standing, to decrease pain and promote long term stability.  [] Progressing  [x] Met  [] Not Met 8/10/2023    GAIT: Patient will demonstrate improved gait mechanics in order to improve functional mobility, improve quality of life, and decrease risk of further injury or fall.  [] Progressing  [x] Met  [] Not Met 7/13/2023    HEP: Patient will demonstrate independence with HEP in order to progress toward functional independence. [] Progressing  [x] Met  [] Not Met 7/13/2023       Long Term Goals:  12 weeks Status Date Met   PAIN: Pt will report worst pain of 3/10 in order to progress toward max functional ability and improve quality of life [] Progressing  [x] Met  [] Not Met  8/10/2023   FUNCTION: Patient will demonstrate improved function as indicated by a functional limitation score of less than or equal to 42 out of 100 on FOTO. [] Progressing  [x] Met  [] Not Met  7/13/2023   MOBILITY: Patient will improve AROM to stated goals, listed in objective measures above, in order to return to maximal functional potential and improve quality of life.  [x] Progressing  [] Met  [] Not Met Partially Met  8/10/2023    STRENGTH: Patient will improve strength to stated goals, listed in objective measures above, in order to improve functional independence and quality of life.  [x] Progressing  [] Met  [] Not Met  Partially Met  8/10/2023    GAIT: Patient will demonstrate normalized gait mechanics with minimal compensation in order to return to PLOF. [] Progressing  [x] Met  [] Not Met  8/10/2023   Patient will return to normal ADL's, IADL's, community involvement, recreational activities, and  work-related activities with less than or equal to 2/10 pain and maximal function.  [] Progressing  [x] Met  [] Not Met 8/10/2023          Plan     8/10/2023: Patient is considered DC from PT with HEP at this time.     6/15/2023 (evaluation): Outpatient Physical Therapy 2 times weekly for 12 weeks to include any combination of the following interventions: virtual visits, dry needling, modalities, electrical stimulation (IFC, Pre-Mod, Attended with Functional Dry Needling), Aquatic Therapy, Cervical/Lumbar Traction, Gait Training, Manual Therapy, Neuromuscular Re-ed, Patient Education, Self Care, Therapeutic Exercise, and Therapeutic Activites     Irma Garzon, PT

## 2023-08-10 NOTE — PROGRESS NOTES
[facility]   Physical Therapy Treatment Note + Discharge Summary       Name: Rodolfo Alvarez  Clinic Number: [id number]    Therapy Diagnosis:   Encounter Diagnoses   Name Primary?    Primary osteoarthritis of both knees Yes    Chronic pain of both knees     Decreased range of motion of both knees     Decreased strength, endurance, and mobility      Physician: Katelynn Azul PA    Visit Date: 8/10/2023    Physician Orders: PT Eval and Treat  Medical Diagnosis from Referral: primary osteoarthritis of both knees  Evaluation Date: 6/15/2023  Authorization Period Expiration: 12/3/2023  Plan of Care Expiration: 9/13/2023  Progress Note Due: 8/12/2023  Visit # / Visits authorized: 10/20 (+eval)   FOTO: 1/3 (last performed on 6/15/2023)     Precautions: Standard and recent humerus fracture    PTA Visit #: 0/5     Time In: 1430  Time Out: 1515  Total Billable Time: 45 minutes (Billing reflects 1 on 1 treatment time spent with patient)    Subjective     Patient reports: feeling pretty good today. He feels his knee range of motion and strength have improved since beginning PT. He is satisfied with his progress and would like to make today his last day.     He/She was compliant with home exercise program.  Response to previous treatment: no adverse reaction   Functional change: decreasing pain levels, improving LE strength    Pain: 1-2/10  (left knee today)  Location: bilateral knee pain, L>R    Objective      Objective Measures updated at progress report or POC update only unless otherwise noted.     RANGE OF MOTION: **numbers in () are from initial evaluation)  Knee AROM/PROM Right Left Pain/Dysfunction with Movement Goal   Knee Flexion (135º) 130  (124) 130  (130) No pain with ROM measurements today 130   Knee Extension (0º) -4  (-12) -1  (-6)   0         STRENGTH:   L/E MMT Right Left Pain/Dysfunction with Movement Goal Right   8/10/2023  Left  8/10/2023    Hip Flexion  4+/5 4+/5 No pain with  MMT's this visit 4+/5 B 4+ 4+   Hip Extension  3+/5 3+/5   4+/5 B 4 4   Hip Abduction  3+/5 3+/5   4+/5 B 4- 4-   Knee Extension 4+/5 4+/5   5/5 B 4+ 4+   Knee Flexion 4+/5 4+/5   5/5 B 5 5   Hip IR 4-/5 4-/5   4+/5 B 4 4   Hip ER 4-/5 4-/5   4+/5 B 4 4   Ankle DF 4+/5 4+/5   5/5 B 5 5   Ankle PF 4+/5 4+/5   5/5 B 5 5         MUSCLE LENGTH:   Muscle Tested  Right Left  Goal   Hip Flexors [] Normal  [x] Limited [] Normal  [x] Limited Normal B   ITB / TFL [] Normal  [x] Limited [] Normal  [x] Limited Normal B   Quadriceps [] Normal  [x] Limited [] Normal  [x] Limited Normal B   Hamstrings  [] Normal  [] Limited [] Normal  [x] Limited Normal B   Gastrocnemius  [] Normal  [x] Limited [] Normal  [x] Limited Normal B   Soleus  [] Normal  [x] Limited [] Normal  [x] Limited Normal B    **Although still decreased, muscle length has improved as compared to initial evaluation.      JOINT MOBILITY:   Joint Motion Right Mobility Left Mobility Goal   Distal Femur AP [x] Hypo     [] Normal     [] Hyper [x] Hypo     [] Normal     [] Hyper Normal    Proximal Tibia AP [x] Hypo     [] Normal     [] Hyper [x] Hypo     [] Normal     [] Hyper Normal    Patellar Medial Glide  [] Hypo     [x] Normal     [] Hyper [] Hypo     [x] Normal     [] Hyper Normal    Patellar Lateral Glide  [] Hypo     [x] Normal     [] Hyper [] Hypo     [x] Normal     [] Hyper Normal    Patellar Superior Glide  [] Hypo     [x] Normal     [] Hyper [] Hypo     [x] Normal     [] Hyper Normal    Patellar Inferior Glide  [] Hypo     [x] Normal     [] Hyper [] Hypo     [x] Normal     [] Hyper Normal     **although still hypomobile, joint mobility has improved as compared to initial visits.      SPECIAL TESTS:     Right  (spine) Left  Goal   Varus Stress Test [] Positive    [x] Negative [] Positive    [x] Negative Negative B    Valgus Stress Test [] Positive    [x] Negative [] Positive    [x] Negative Negative B          SENSATION  [x] Intact to Light Touch                 "       [] Impaired:        PALPATION: Muscles: Increased tone and tenderness to palpation of: bilateral quadriceps, hamstrings, hip adductors, pes anserine, gastrocnemius, but decreased as compared to previous visits.         POSTURE:  Pt presents with postural abnormalities which include:               [x] Forward Head                                [] Increased Lumbar Lordosis              [x] Rounded Shoulder                         [] Genu Recurvatum              [] Increased Thoracic Kyphosis        [x] Genu Valgus - L LE              [] Trunk Deviated                              [] Pes Planus              [] Scapular Winging                          [x] Other: Genu Varus R LE        GAIT ANALYSIS The patient ambulated with the following assistive device: straight cane; the pt presents with the following gait abnormalities: bradykinetic, increased ALVARADO, decreased step length bilateral, decreased knee flexion on right, and decreased knee extension bilateral  8/10/2023: Patient still presents ambulating with straight cane. He now demonstrates improvement in mannie and step length. His right knee flexion and bilateral knee extension have improved as well.         FUNCTIONAL MOVEMENT PATTERNS  Movement Analysis Notes   Bed Mobility  []Functional  [x]Dysfunctional:  []Painful  []Non-Painful    Independent but core strength still limited   Sit to Stand [x]Functional  []Dysfunctional:  []Painful  [x]Non-Painful    Equal weight bearing, no UE support         Function:       Treatment     Rodolfo received the treatments listed below:       THERAPEUTIC EXERCISES to develop strength, endurance, ROM, flexibility, posture, and core stabilization for (16) minutes including:    Intervention Performed Today    Nustep for ROM and strength  6' level 3   Recumbent bike for ROM and strength x 6' level 1   Ball squeezes   3', 3" holds on and off   Tailgaters   3'                       Final Re-assessment x See objective section for " "details      Plan for Next Visit:          NEUROMUSCULAR RE-EDUCATION ACTIVITIES to improve Balance, Coordination, Kinesthetic, Sense, Proprioception, and Posture for (17) minutes.  The following were included:    Intervention Performed Today    Quad sets x 3', 5" holds B LE   SAQ  x 3', B LE, 4#   Straight leg raises x 3 x 10, each LE   Sidelying clams  2 x 10 each LE   LAQ  x 3', B LE, 4#                    Plan for Next Visit:          THERAPEUTIC ACTIVITIES to improve dynamic and functional  performance for (12) minutes including:    Intervention Performed Today    Bridges   3 x 10   Sit to Stands x 3 x 10 (hi-low mat), higher surface without use of UE of assistance    Standing TKE  3 x 10 each LE, purple band   Step ups  x 1 x 10 each foot, 6" step   Rhomberg Stance  x 1 x 30" each foot placement   Tandem Stance  x 1 x 30" each foot placement, using UE on and off as needed   Hurdles   Down and back 3x in II bars, small hurdles, using 1-2 UE assist on and off as needed   Side stepping over hurdles (small)   Down and back 3x in II bars     Plan for Next Visit: side stepping,         Patient Education and Home Exercises       Home Exercises Provided and Patient Education Provided     Education provided:   PURPOSE: Patient educated on the impairments noted above and the effects of physical therapy intervention to improve overall condition and QOL.   EXERCISE: Patient was educated on all the above exercise prior/during/after for proper posture, positioning, and execution for safe performance with home exercise program.   STRENGTH: Patient educated on the importance of improved core and extremity strength in order to improve alignment of the spine and extremities with static positions and dynamic movement.   GAIT & BALANCE: Patient educated on the importance of strong core and lower extremity musculature in order to improve both static and dynamic balance, improve gait mechanics, reduce fall risk and improve " household and community mobility.   POSTURE: Patient educated on postural awareness to reduce stress and maintain optimal alignment of the spine with static positions and dynamic movement     Written Home Exercises Provided: yes.  Exercises were reviewed and Rodolfo was able to demonstrate them prior to the end of the session.  Rodolfo demonstrated good  understanding of the education provided. See EMR under Patient Instructions for exercises provided during therapy sessions.    Assessment     Patient tolerated treatment well and has attended 11 total PT visits. He has made good overall progress with PT, demonstrating improvement in bilateral knee range of motion, lower extremity strength, and overall gait. His balance as also especially improved in recent visits with balance activities in the clinic. Patient reports decreased pain levels and improved overall functional ability with less limitation. He is independent with exercises and appropriate for DC with HEP at this time.     Rodolfo is progressing well towards his goals.   Patient prognosis is Fair.     Patient will continue to benefit from skilled outpatient physical therapy to address the deficits listed in the problem list box on initial evaluation, provide pt/family education and to maximize patient's level of independence in the home and community environment.     Patient's spiritual, cultural and educational needs considered and pt agreeable to plan of care and goals.     Anticipated Barriers for therapy: co-morbidities, sedentary lifestyle, chronicity of condition, and adherence to treatment plan    Goal:  Short Term Goals:  6 weeks Status  Date Met   PAIN: Pt will report worst pain of 5/10 in order to progress toward max functional ability and improve quality of life. [] Progressing  [x] Met  [] Not Met  7/13/2023   FUNCTION: Patient will demonstrate improved function as indicated by a functional limitation score of less than or equal to 48 out of 100 on  FOTO. [] Progressing  [x] Met  [] Not Met  7/13/2023   STRENGTH: Patient will improve strength to 50% of stated goals, listed in objective measures above, in order to progress towards independence with functional activities. [] Progressing  [x] Met  [] Not Met 8/10/2023   POSTURE: Patient will correct postural deviations in sitting and standing, to decrease pain and promote long term stability.  [] Progressing  [x] Met  [] Not Met 8/10/2023    GAIT: Patient will demonstrate improved gait mechanics in order to improve functional mobility, improve quality of life, and decrease risk of further injury or fall.  [] Progressing  [x] Met  [] Not Met 7/13/2023    HEP: Patient will demonstrate independence with HEP in order to progress toward functional independence. [] Progressing  [x] Met  [] Not Met 7/13/2023       Long Term Goals:  12 weeks Status Date Met   PAIN: Pt will report worst pain of 3/10 in order to progress toward max functional ability and improve quality of life [] Progressing  [x] Met  [] Not Met  8/10/2023   FUNCTION: Patient will demonstrate improved function as indicated by a functional limitation score of less than or equal to 42 out of 100 on FOTO. [] Progressing  [x] Met  [] Not Met  7/13/2023   MOBILITY: Patient will improve AROM to stated goals, listed in objective measures above, in order to return to maximal functional potential and improve quality of life.  [x] Progressing  [] Met  [] Not Met Partially Met  8/10/2023    STRENGTH: Patient will improve strength to stated goals, listed in objective measures above, in order to improve functional independence and quality of life.  [x] Progressing  [] Met  [] Not Met  Partially Met  8/10/2023    GAIT: Patient will demonstrate normalized gait mechanics with minimal compensation in order to return to PLOF. [] Progressing  [x] Met  [] Not Met  8/10/2023   Patient will return to normal ADL's, IADL's, community involvement, recreational activities, and  work-related activities with less than or equal to 2/10 pain and maximal function.  [] Progressing  [x] Met  [] Not Met 8/10/2023          Plan     8/10/2023: Patient is considered DC from PT with HEP at this time.     6/15/2023 (evaluation): Outpatient Physical Therapy 2 times weekly for 12 weeks to include any combination of the following interventions: virtual visits, dry needling, modalities, electrical stimulation (IFC, Pre-Mod, Attended with Functional Dry Needling), Aquatic Therapy, Cervical/Lumbar Traction, Gait Training, Manual Therapy, Neuromuscular Re-ed, Patient Education, Self Care, Therapeutic Exercise, and Therapeutic Activites     Irma Garzon, PT

## 2023-09-11 ENCOUNTER — OFFICE VISIT (OUTPATIENT)
Dept: ORTHOPEDICS | Facility: CLINIC | Age: 73
End: 2023-09-11
Payer: MEDICARE

## 2023-09-11 VITALS
HEART RATE: 64 BPM | WEIGHT: 225.06 LBS | SYSTOLIC BLOOD PRESSURE: 119 MMHG | BODY MASS INDEX: 36.17 KG/M2 | HEIGHT: 66 IN | DIASTOLIC BLOOD PRESSURE: 68 MMHG

## 2023-09-11 DIAGNOSIS — M17.0 PRIMARY OSTEOARTHRITIS OF BOTH KNEES: Primary | ICD-10-CM

## 2023-09-11 DIAGNOSIS — M21.162 ACQUIRED VARUS DEFORMITY KNEE, LEFT: ICD-10-CM

## 2023-09-11 DIAGNOSIS — M21.161 ACQUIRED VARUS DEFORMITY KNEE, RIGHT: ICD-10-CM

## 2023-09-11 PROCEDURE — 1125F AMNT PAIN NOTED PAIN PRSNT: CPT | Mod: CPTII,S$GLB,, | Performed by: PHYSICIAN ASSISTANT

## 2023-09-11 PROCEDURE — 3078F PR MOST RECENT DIASTOLIC BLOOD PRESSURE < 80 MM HG: ICD-10-PCS | Mod: CPTII,S$GLB,, | Performed by: PHYSICIAN ASSISTANT

## 2023-09-11 PROCEDURE — 3008F BODY MASS INDEX DOCD: CPT | Mod: CPTII,S$GLB,, | Performed by: PHYSICIAN ASSISTANT

## 2023-09-11 PROCEDURE — 3074F SYST BP LT 130 MM HG: CPT | Mod: CPTII,S$GLB,, | Performed by: PHYSICIAN ASSISTANT

## 2023-09-11 PROCEDURE — 3288F PR FALLS RISK ASSESSMENT DOCUMENTED: ICD-10-PCS | Mod: CPTII,S$GLB,, | Performed by: PHYSICIAN ASSISTANT

## 2023-09-11 PROCEDURE — 99214 PR OFFICE/OUTPT VISIT, EST, LEVL IV, 30-39 MIN: ICD-10-PCS | Mod: 25,S$GLB,, | Performed by: PHYSICIAN ASSISTANT

## 2023-09-11 PROCEDURE — 99214 OFFICE O/P EST MOD 30 MIN: CPT | Mod: 25,S$GLB,, | Performed by: PHYSICIAN ASSISTANT

## 2023-09-11 PROCEDURE — 20610 LARGE JOINT ASPIRATION/INJECTION: BILATERAL KNEE: ICD-10-PCS | Mod: 50,S$GLB,, | Performed by: PHYSICIAN ASSISTANT

## 2023-09-11 PROCEDURE — 1159F MED LIST DOCD IN RCRD: CPT | Mod: CPTII,S$GLB,, | Performed by: PHYSICIAN ASSISTANT

## 2023-09-11 PROCEDURE — 99999 PR PBB SHADOW E&M-EST. PATIENT-LVL IV: ICD-10-PCS | Mod: PBBFAC,,, | Performed by: PHYSICIAN ASSISTANT

## 2023-09-11 PROCEDURE — 20610 DRAIN/INJ JOINT/BURSA W/O US: CPT | Mod: 50,S$GLB,, | Performed by: PHYSICIAN ASSISTANT

## 2023-09-11 PROCEDURE — 1125F PR PAIN SEVERITY QUANTIFIED, PAIN PRESENT: ICD-10-PCS | Mod: CPTII,S$GLB,, | Performed by: PHYSICIAN ASSISTANT

## 2023-09-11 PROCEDURE — 1159F PR MEDICATION LIST DOCUMENTED IN MEDICAL RECORD: ICD-10-PCS | Mod: CPTII,S$GLB,, | Performed by: PHYSICIAN ASSISTANT

## 2023-09-11 PROCEDURE — 3008F PR BODY MASS INDEX (BMI) DOCUMENTED: ICD-10-PCS | Mod: CPTII,S$GLB,, | Performed by: PHYSICIAN ASSISTANT

## 2023-09-11 PROCEDURE — 1101F PT FALLS ASSESS-DOCD LE1/YR: CPT | Mod: CPTII,S$GLB,, | Performed by: PHYSICIAN ASSISTANT

## 2023-09-11 PROCEDURE — 1101F PR PT FALLS ASSESS DOC 0-1 FALLS W/OUT INJ PAST YR: ICD-10-PCS | Mod: CPTII,S$GLB,, | Performed by: PHYSICIAN ASSISTANT

## 2023-09-11 PROCEDURE — 1160F PR REVIEW ALL MEDS BY PRESCRIBER/CLIN PHARMACIST DOCUMENTED: ICD-10-PCS | Mod: CPTII,S$GLB,, | Performed by: PHYSICIAN ASSISTANT

## 2023-09-11 PROCEDURE — 99999 PR PBB SHADOW E&M-EST. PATIENT-LVL IV: CPT | Mod: PBBFAC,,, | Performed by: PHYSICIAN ASSISTANT

## 2023-09-11 PROCEDURE — 4010F ACE/ARB THERAPY RXD/TAKEN: CPT | Mod: CPTII,S$GLB,, | Performed by: PHYSICIAN ASSISTANT

## 2023-09-11 PROCEDURE — 3078F DIAST BP <80 MM HG: CPT | Mod: CPTII,S$GLB,, | Performed by: PHYSICIAN ASSISTANT

## 2023-09-11 PROCEDURE — 3288F FALL RISK ASSESSMENT DOCD: CPT | Mod: CPTII,S$GLB,, | Performed by: PHYSICIAN ASSISTANT

## 2023-09-11 PROCEDURE — 3074F PR MOST RECENT SYSTOLIC BLOOD PRESSURE < 130 MM HG: ICD-10-PCS | Mod: CPTII,S$GLB,, | Performed by: PHYSICIAN ASSISTANT

## 2023-09-11 PROCEDURE — 1160F RVW MEDS BY RX/DR IN RCRD: CPT | Mod: CPTII,S$GLB,, | Performed by: PHYSICIAN ASSISTANT

## 2023-09-11 PROCEDURE — 4010F PR ACE/ARB THEARPY RXD/TAKEN: ICD-10-PCS | Mod: CPTII,S$GLB,, | Performed by: PHYSICIAN ASSISTANT

## 2023-09-11 RX ORDER — LIDOCAINE HYDROCHLORIDE 10 MG/ML
5 INJECTION INFILTRATION; PERINEURAL
Status: DISCONTINUED | OUTPATIENT
Start: 2023-09-11 | End: 2023-09-11 | Stop reason: HOSPADM

## 2023-09-11 RX ORDER — NABUMETONE 500 MG/1
500 TABLET, FILM COATED ORAL DAILY
Qty: 30 TABLET | Refills: 2 | Status: SHIPPED | OUTPATIENT
Start: 2023-09-11

## 2023-09-11 RX ORDER — METHYLPREDNISOLONE ACETATE 80 MG/ML
80 INJECTION, SUSPENSION INTRA-ARTICULAR; INTRALESIONAL; INTRAMUSCULAR; SOFT TISSUE
Status: DISCONTINUED | OUTPATIENT
Start: 2023-09-11 | End: 2023-09-11 | Stop reason: HOSPADM

## 2023-09-11 RX ADMIN — LIDOCAINE HYDROCHLORIDE 5 ML: 10 INJECTION INFILTRATION; PERINEURAL at 01:09

## 2023-09-11 RX ADMIN — METHYLPREDNISOLONE ACETATE 80 MG: 80 INJECTION, SUSPENSION INTRA-ARTICULAR; INTRALESIONAL; INTRAMUSCULAR; SOFT TISSUE at 01:09

## 2023-09-11 NOTE — PROGRESS NOTES
Patient ID: Rodolfo Alvarez is a 73 y.o. male.    Chief Complaint: Pain of the Left Knee and Pain of the Right Knee      HPI: Rodolfo Alvarez  is a 73 y.o. male who c/o Pain of the Left Knee and Pain of the Right Knee       Patient presents as a new patient to me today with chief complaint of bilateral knee pain.  Patient states this has been ongoing for 20 years or so but has increased quite significantly over the last year.  He states he had a fall roughly 6 weeks ago that resulted in a humerus fracture for which he is being established by our Trauma Department.  He is treated non operatively with conservative measures.  He is using a cane to assist with ambulation.  His son who accompanies him to his appointment states additionally he is started to shuffle his gait.  He notes pain is at worst a 7/10 having a hard time walking long distances, going from a sitting to standing standing to seated position, on level terrain or stairs.  The patient states additionally he has increasing morning stiffness and a very hard time going up stairs for which he needs to be able to accomplish to get into his condo.  He has been using Voltaren gel topically with minimal at best relief along with Tylenol.  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    9/11/2023  The patient presents today for follow-up of bilateral knee pain, left greater than right.  Patient states pain is at worst a 2/10 today.  He states he is a hard time walking long distances and he is compensating more for the right leg he finds.  He is a hard time going from a sitting to standing or standing to seated position, unlevel terrain or stairs are  especially tricky.  Using a cane today to assist with ambulation.  He denies any trauma or falls.  He notes the Depo shot he received 3 months ago is providing further relief and inquires about getting this repeated today.  Additionally he states he does not feel that the Celebrex is helping and he has been alternating this with ibuprofen instead as he feels ibuprofen helps more.  We discussed all medications at length and will make medication changes today.  Additionally he has been using the Voltaren gel although not daily or multiple times throughout the day which we further discussed.    Patient is presently denying any shortness of breath, chest pain, fever/chills, nausea/vomiting, loss of taste or smell, numbness/tingling or sensation changes, loss of bladder or bowel function, loss of taste/smell.       Past Medical History:   Diagnosis Date    HLD (hyperlipidemia)     Hypertension        History reviewed. No pertinent surgical history.    History reviewed. No pertinent family history.    Social History     Socioeconomic History    Marital status: Single   Tobacco Use    Smoking status: Never    Smokeless tobacco: Never   Substance and Sexual Activity    Alcohol use: Not Currently    Drug use: Never    Sexual activity: Not Currently       Medication List with Changes/Refills   Current Medications    ACETAMINOPHEN (TYLENOL) 325 MG TABLET    Take 325 mg by mouth every 6 (six) hours as needed for Pain.    AMLODIPINE (NORVASC) 10 MG TABLET    Take 10 mg by mouth.    ASCORBIC ACID (VITAMIN C ORAL)    Take by mouth once daily.    ASPIRIN (ECOTRIN) 81 MG EC TABLET    Take 81 mg by mouth once daily.    ATORVASTATIN (LIPITOR) 20 MG TABLET    Take 20 mg by mouth.    ATORVASTATIN (LIPITOR) 20 MG TABLET    Take 1 tablet by mouth once daily.    CAFFEINE ORAL    Take by mouth daily as needed.    CELECOXIB (CELEBREX) 200 MG CAPSULE    Take 1 capsule (200 mg total) by mouth once daily.    DICLOFENAC SODIUM (VOLTAREN) 1 % GEL     Apply 2 g topically 3 (three) times daily.    DOCOSAHEXAENOIC ACID/EPA (FISH OIL ORAL)    Take by mouth once daily.    DOCUSATE SODIUM (STOOL SOFTENER ORAL)    Take by mouth daily as needed.    HYDROCODONE-ACETAMINOPHEN (NORCO) 5-325 MG PER TABLET    Take 1 tablet by mouth every 8 (eight) hours as needed for Pain.    IBUPROFEN (ADVIL,MOTRIN) 200 MG TABLET    Take 200 mg by mouth every 6 (six) hours as needed for Pain.    LISINOPRIL (PRINIVIL,ZESTRIL) 40 MG TABLET    Take 40 mg by mouth.    LORATADINE ORAL    Take by mouth daily as needed.    MELATONIN 5 MG CAP    Take by mouth nightly as needed.    MULTIVIT-MIN/FOLIC/VIT K/LYCOP (MEN'S 50 PLUS MULTIVITAMIN ORAL)    Take by mouth once daily.    MV-MN/IRON/FOLIC ACID/HERB 190 (VITAMIN D3 COMPLETE ORAL)    Take by mouth once daily.    PSYLLIUM SEED, WITH SUGAR, (FIBER ORAL)    Take by mouth daily as needed.    TAMSULOSIN (FLOMAX) 0.4 MG CAP    TAKE 1 CAPSULE BY MOUTH AT BEDTIME  DAYS.    TRAZODONE (DESYREL) 50 MG TABLET    Take 50 mg by mouth every evening.       Review of patient's allergies indicates:  No Known Allergies      Objective:     Lower Extremity    Right KNEE:  ROM: passive flex/ ext full 0-120 degree  Patella midling, moderate crepitus noted   Ligaments stable  Pain on palpation to medial and lateral aspect  Calf NT, soft  Mild edema present  (-) Bijan sign  DF/PF full  Wiggles toes  Sensation intact to light touch   No pitting edema appreciated   NVI  Cap refill < 2 sec    Left  KNEE:  ROM: passive flex/ ext noted to have roughly 3-5 degrees extension contraction and full flexion of 120°  Patella midling, mild crepitus noted   Ligaments stable  Pain on palpation to medial and lateral aspect  Calf NT, soft   No edema appreciated  (-) Bijan sign  DF/PF full  Wiggles toes  Sensation intact to light touch   No pitting edema appreciated   NVI  Cap refill < 2 sec  Skin warm to touch, no obvious lesion noted       IMAGING:    XRAY:  FINDINGS:  No  fractures nor dislocations.  Tricompartmental osteophytes with total medial compartment joint space loss bilaterally.  No joint effusions.  No soft tissue calcifications.        Impression:   Severe tricompartmental osteoarthritis of the bilateral knees with total medial compartment joint space loss bilaterally       Kellgren Car scale : 4     Assessment:       Encounter Diagnoses   Name Primary?    Primary osteoarthritis of both knees Yes    Acquired varus deformity knee, right     Acquired varus deformity knee, left           Plan:       Rodolfo was seen today for pain and pain.    Diagnoses and all orders for this visit:    Primary osteoarthritis of both knees  -     Large Joint Aspiration/Injection: bilateral knee    Acquired varus deformity knee, right    Acquired varus deformity knee, left        Rodolfo flores is a new patient who presents to me today with chief complaint of bilateral knee pain, right greater than left at times. We had a long discussion today regarding degenerative arthritis in the knees. The patient understands that arthritis is chronic and will worsen over time.  The patient also understands that arthritis may cause episodic flare-ups in pain. Management or if arthritis is achieved through a multi-modal approach including weight loss in obese individuals, activity modification, NSAIDs (topical vs oral) where appropriate, periodic intra-articular steroid injections, viscosupplementation, physical therapy, knee bracing, ambulatory aids, as well as geniculate nerve blocks.  The patient elected to proceed with bilateral short acting steroid injections today.  I asked that he refrain from standing water for 24 hours.  He may advance activity as tolerated with elevation and ice as needed.  Additionally we discussed beginning an NSAID.  I would like for him to stop the Celebrex and ibuprofen as he was alternating these.  I would like him to start taking Relafen 1 pill daily as needed.  We  again discussed that he is not to take full aspirin/Excedrin or ibuprofen with this medication, he may take Relafen and Tylenol together should he need.  I would like to see him back in 3 months for further evaluation.  May call with any questions or concerns in the interim.    Dr. Beck is aware of the patient & current presentation. He agrees with the current plan above.     Patient verbalized understanding of all instructions and agreed with the above plan.    No follow-ups on file.    The patient understands, chooses and consents to this plan and accepts all   the risks which include but are not limited to the risks mentioned above.     Disclaimer: This note was prepared using a voice recognition system and is likely to have sound alike errors within the text.

## 2023-09-11 NOTE — PROCEDURES
Large Joint Aspiration/Injection: bilateral knee    Date/Time: 9/11/2023 1:30 PM    Performed by: Katelynn Azul PA  Authorized by: Katelynn Azul PA    Consent Done?:  Yes (Verbal)  Indications:  Arthritis, joint swelling and pain  Site marked: the procedure site was marked      Local anesthesia used?: Yes    Local anesthetic:  Topical anesthetic    Details:  Needle Size:  22 G  Approach:  Anterolateral  Location:  Knee  Laterality:  Bilateral  Site:  Bilateral knee  Medications (Right):  5 mL LIDOcaine HCL 10 mg/ml (1%) 10 mg/mL (1 %); 80 mg methylPREDNISolone acetate 80 mg/mL  Medications (Left):  5 mL LIDOcaine HCL 10 mg/ml (1%) 10 mg/mL (1 %); 80 mg methylPREDNISolone acetate 80 mg/mL  Patient tolerance:  Patient tolerated the procedure well with no immediate complications     Verbal consent was obtained  The patient's ID, site, side was verified  The site was sterile prepped in standard fashion  The injection was performed in the cezar-lateral side without complication  A sterile Band-Aid was applied    Patient was directed to apply ice today at roughly 15 minutes at a time as needed.  It was discussed that they may be sore for the next few days or so.  Please avoid strenuous activity over the next 24 hours.  It was also discussed that the patient may have a increase in glucose if diabetic and should monitor levels.  Patient was instructed to call as needed.

## 2023-12-11 ENCOUNTER — HOSPITAL ENCOUNTER (OUTPATIENT)
Dept: RADIOLOGY | Facility: HOSPITAL | Age: 73
Discharge: HOME | End: 2023-12-11
Attending: PHYSICIAN ASSISTANT
Payer: MEDICARE

## 2023-12-11 ENCOUNTER — TELEPHONE (OUTPATIENT)
Dept: PAIN MEDICINE | Facility: CLINIC | Age: 73
End: 2023-12-11
Payer: MEDICARE

## 2023-12-11 ENCOUNTER — OFFICE VISIT (OUTPATIENT)
Dept: ORTHOPEDICS | Facility: CLINIC | Age: 73
End: 2023-12-11
Payer: MEDICARE

## 2023-12-11 VITALS
BODY MASS INDEX: 36.17 KG/M2 | HEIGHT: 66 IN | WEIGHT: 225.06 LBS | DIASTOLIC BLOOD PRESSURE: 73 MMHG | HEART RATE: 80 BPM | SYSTOLIC BLOOD PRESSURE: 127 MMHG

## 2023-12-11 DIAGNOSIS — M17.0 PRIMARY OSTEOARTHRITIS OF BOTH KNEES: Primary | ICD-10-CM

## 2023-12-11 DIAGNOSIS — M21.161 ACQUIRED VARUS DEFORMITY KNEE, RIGHT: ICD-10-CM

## 2023-12-11 DIAGNOSIS — M54.16 LUMBAR RADICULOPATHY: ICD-10-CM

## 2023-12-11 DIAGNOSIS — M54.12 CERVICAL RADICULOPATHY: ICD-10-CM

## 2023-12-11 DIAGNOSIS — M21.162 ACQUIRED VARUS DEFORMITY KNEE, LEFT: ICD-10-CM

## 2023-12-11 PROCEDURE — 72100 XR LUMBAR SPINE AP AND LATERAL: ICD-10-PCS | Mod: 26,,, | Performed by: RADIOLOGY

## 2023-12-11 PROCEDURE — 3078F PR MOST RECENT DIASTOLIC BLOOD PRESSURE < 80 MM HG: ICD-10-PCS | Mod: CPTII,S$GLB,, | Performed by: PHYSICIAN ASSISTANT

## 2023-12-11 PROCEDURE — 20610 LARGE JOINT ASPIRATION/INJECTION: BILATERAL KNEE: ICD-10-PCS | Mod: 50,S$GLB,, | Performed by: PHYSICIAN ASSISTANT

## 2023-12-11 PROCEDURE — 1159F MED LIST DOCD IN RCRD: CPT | Mod: CPTII,S$GLB,, | Performed by: PHYSICIAN ASSISTANT

## 2023-12-11 PROCEDURE — 1125F AMNT PAIN NOTED PAIN PRSNT: CPT | Mod: CPTII,S$GLB,, | Performed by: PHYSICIAN ASSISTANT

## 2023-12-11 PROCEDURE — 3074F SYST BP LT 130 MM HG: CPT | Mod: CPTII,S$GLB,, | Performed by: PHYSICIAN ASSISTANT

## 2023-12-11 PROCEDURE — 99999 PR PBB SHADOW E&M-EST. PATIENT-LVL V: CPT | Mod: PBBFAC,,, | Performed by: PHYSICIAN ASSISTANT

## 2023-12-11 PROCEDURE — 3288F PR FALLS RISK ASSESSMENT DOCUMENTED: ICD-10-PCS | Mod: CPTII,S$GLB,, | Performed by: PHYSICIAN ASSISTANT

## 2023-12-11 PROCEDURE — 72100 X-RAY EXAM L-S SPINE 2/3 VWS: CPT | Mod: 26,,, | Performed by: RADIOLOGY

## 2023-12-11 PROCEDURE — 99214 OFFICE O/P EST MOD 30 MIN: CPT | Mod: 25,S$GLB,, | Performed by: PHYSICIAN ASSISTANT

## 2023-12-11 PROCEDURE — 72040 X-RAY EXAM NECK SPINE 2-3 VW: CPT | Mod: TC

## 2023-12-11 PROCEDURE — 3288F FALL RISK ASSESSMENT DOCD: CPT | Mod: CPTII,S$GLB,, | Performed by: PHYSICIAN ASSISTANT

## 2023-12-11 PROCEDURE — 72040 XR CERVICAL SPINE 2 OR 3 VIEWS: ICD-10-PCS | Mod: 26,,, | Performed by: RADIOLOGY

## 2023-12-11 PROCEDURE — 20610 DRAIN/INJ JOINT/BURSA W/O US: CPT | Mod: 50,S$GLB,, | Performed by: PHYSICIAN ASSISTANT

## 2023-12-11 PROCEDURE — 99214 PR OFFICE/OUTPT VISIT, EST, LEVL IV, 30-39 MIN: ICD-10-PCS | Mod: 25,S$GLB,, | Performed by: PHYSICIAN ASSISTANT

## 2023-12-11 PROCEDURE — 1101F PR PT FALLS ASSESS DOC 0-1 FALLS W/OUT INJ PAST YR: ICD-10-PCS | Mod: CPTII,S$GLB,, | Performed by: PHYSICIAN ASSISTANT

## 2023-12-11 PROCEDURE — 3074F PR MOST RECENT SYSTOLIC BLOOD PRESSURE < 130 MM HG: ICD-10-PCS | Mod: CPTII,S$GLB,, | Performed by: PHYSICIAN ASSISTANT

## 2023-12-11 PROCEDURE — 1125F PR PAIN SEVERITY QUANTIFIED, PAIN PRESENT: ICD-10-PCS | Mod: CPTII,S$GLB,, | Performed by: PHYSICIAN ASSISTANT

## 2023-12-11 PROCEDURE — 3078F DIAST BP <80 MM HG: CPT | Mod: CPTII,S$GLB,, | Performed by: PHYSICIAN ASSISTANT

## 2023-12-11 PROCEDURE — 3008F BODY MASS INDEX DOCD: CPT | Mod: CPTII,S$GLB,, | Performed by: PHYSICIAN ASSISTANT

## 2023-12-11 PROCEDURE — 3008F PR BODY MASS INDEX (BMI) DOCUMENTED: ICD-10-PCS | Mod: CPTII,S$GLB,, | Performed by: PHYSICIAN ASSISTANT

## 2023-12-11 PROCEDURE — 1160F RVW MEDS BY RX/DR IN RCRD: CPT | Mod: CPTII,S$GLB,, | Performed by: PHYSICIAN ASSISTANT

## 2023-12-11 PROCEDURE — 1159F PR MEDICATION LIST DOCUMENTED IN MEDICAL RECORD: ICD-10-PCS | Mod: CPTII,S$GLB,, | Performed by: PHYSICIAN ASSISTANT

## 2023-12-11 PROCEDURE — 1101F PT FALLS ASSESS-DOCD LE1/YR: CPT | Mod: CPTII,S$GLB,, | Performed by: PHYSICIAN ASSISTANT

## 2023-12-11 PROCEDURE — 4010F ACE/ARB THERAPY RXD/TAKEN: CPT | Mod: CPTII,S$GLB,, | Performed by: PHYSICIAN ASSISTANT

## 2023-12-11 PROCEDURE — 99999 PR PBB SHADOW E&M-EST. PATIENT-LVL V: ICD-10-PCS | Mod: PBBFAC,,, | Performed by: PHYSICIAN ASSISTANT

## 2023-12-11 PROCEDURE — 4010F PR ACE/ARB THEARPY RXD/TAKEN: ICD-10-PCS | Mod: CPTII,S$GLB,, | Performed by: PHYSICIAN ASSISTANT

## 2023-12-11 PROCEDURE — 72040 X-RAY EXAM NECK SPINE 2-3 VW: CPT | Mod: 26,,, | Performed by: RADIOLOGY

## 2023-12-11 PROCEDURE — 1160F PR REVIEW ALL MEDS BY PRESCRIBER/CLIN PHARMACIST DOCUMENTED: ICD-10-PCS | Mod: CPTII,S$GLB,, | Performed by: PHYSICIAN ASSISTANT

## 2023-12-11 PROCEDURE — 72100 X-RAY EXAM L-S SPINE 2/3 VWS: CPT | Mod: TC

## 2023-12-11 RX ORDER — LIDOCAINE HYDROCHLORIDE 10 MG/ML
5 INJECTION INFILTRATION; PERINEURAL
Status: DISCONTINUED | OUTPATIENT
Start: 2023-12-11 | End: 2023-12-11 | Stop reason: HOSPADM

## 2023-12-11 RX ORDER — METHYLPREDNISOLONE ACETATE 80 MG/ML
80 INJECTION, SUSPENSION INTRA-ARTICULAR; INTRALESIONAL; INTRAMUSCULAR; SOFT TISSUE
Status: DISCONTINUED | OUTPATIENT
Start: 2023-12-11 | End: 2023-12-11 | Stop reason: HOSPADM

## 2023-12-11 RX ADMIN — LIDOCAINE HYDROCHLORIDE 5 ML: 10 INJECTION INFILTRATION; PERINEURAL at 02:12

## 2023-12-11 RX ADMIN — METHYLPREDNISOLONE ACETATE 80 MG: 80 INJECTION, SUSPENSION INTRA-ARTICULAR; INTRALESIONAL; INTRAMUSCULAR; SOFT TISSUE at 02:12

## 2023-12-11 NOTE — PROGRESS NOTES
Patient ID: Rodolfo Alvarez is a 73 y.o. male.    Chief Complaint: Pain of the Right Knee and Pain of the Left Knee      HPI: Rodolfo Alvarez  is a 73 y.o. male who c/o Pain of the Right Knee and Pain of the Left Knee       Patient presents as a new patient to me today with chief complaint of bilateral knee pain.  Patient states this has been ongoing for 20 years or so but has increased quite significantly over the last year.  He states he had a fall roughly 6 weeks ago that resulted in a humerus fracture for which he is being established by our Trauma Department.  He is treated non operatively with conservative measures.  He is using a cane to assist with ambulation.  His son who accompanies him to his appointment states additionally he is started to shuffle his gait.  He notes pain is at worst a 7/10 having a hard time walking long distances, going from a sitting to standing standing to seated position, on level terrain or stairs.  The patient states additionally he has increasing morning stiffness and a very hard time going up stairs for which he needs to be able to accomplish to get into his condo.  He has been using Voltaren gel topically with minimal at best relief along with Tylenol.  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    9/11/2023  The patient presents today for follow-up of bilateral knee pain, left greater than right.  Patient states pain is at worst a 2/10 today.  He states he is a hard time walking long distances and he is compensating more for the right leg he finds.  He is a hard time going from a sitting to standing or standing to seated position, unlevel terrain or stairs are  especially tricky.  Using a cane today to assist with ambulation.  He denies any trauma or falls.  He notes the Depo shot he received 3 months ago is providing further relief and inquires about getting this repeated today.  Additionally he states he does not feel that the Celebrex is helping and he has been alternating this with ibuprofen instead as he feels ibuprofen helps more.  We discussed all medications at length and will make medication changes today.  Additionally he has been using the Voltaren gel although not daily or multiple times throughout the day which we further discussed.    Patient is presently denying any shortness of breath, chest pain, fever/chills, nausea/vomiting, loss of taste or smell, numbness/tingling or sensation changes, loss of bladder or bowel function, loss of taste/smell.     12/11/2023  Patient presents today for follow-up of bilateral knee pain.  He notes the injections he received back in September did provide relief and wore off about a week ago.  He states pain today as 0/10 but can get up to 6/10.  His main complaint is he is noticed progressive weakness to what he is calling the bilateral knees.  The son who accompanies him to his appointments as he has been tripping over his feet a little bit more than normal.  He does use a cane to assist with ambulation and feels he has become more dependent on it.  Furthermore and discussion the patient states he does play flop to use the restroom and can not always control his squatting.  He states he has been dropping the remote more often than not.  He states he is ambulating fine but does have to pay more attention and be conscious of his movements.  He states he had an acute issue with his back in the 90s and had an urgent injection performed and has not seen anyone since then.    He denies any trauma or falls   Patient is presently denying any shortness of breath, chest pain, fever/chills, nausea/vomiting, loss of taste or smell,  numbness/tingling or sensation changes, loss of bladder or bowel function, loss of taste/smell.       Past Medical History:   Diagnosis Date    HLD (hyperlipidemia)     Hypertension        No past surgical history on file.    No family history on file.    Social History     Socioeconomic History    Marital status: Single   Tobacco Use    Smoking status: Never    Smokeless tobacco: Never   Substance and Sexual Activity    Alcohol use: Not Currently    Drug use: Never    Sexual activity: Not Currently       Medication List with Changes/Refills   Current Medications    ACETAMINOPHEN (TYLENOL) 325 MG TABLET    Take 325 mg by mouth every 6 (six) hours as needed for Pain.    AMLODIPINE (NORVASC) 10 MG TABLET    Take 10 mg by mouth.    ASCORBIC ACID (VITAMIN C ORAL)    Take by mouth once daily.    ASPIRIN (ECOTRIN) 81 MG EC TABLET    Take 81 mg by mouth once daily.    ATORVASTATIN (LIPITOR) 20 MG TABLET    Take 20 mg by mouth.    ATORVASTATIN (LIPITOR) 20 MG TABLET    Take 1 tablet by mouth once daily.    CAFFEINE ORAL    Take by mouth daily as needed.    DOCOSAHEXAENOIC ACID/EPA (FISH OIL ORAL)    Take by mouth once daily.    DOCUSATE SODIUM (STOOL SOFTENER ORAL)    Take by mouth daily as needed.    HYDROCODONE-ACETAMINOPHEN (NORCO) 5-325 MG PER TABLET    Take 1 tablet by mouth every 8 (eight) hours as needed for Pain.    IBUPROFEN (ADVIL,MOTRIN) 200 MG TABLET    Take 200 mg by mouth every 6 (six) hours as needed for Pain.    LISINOPRIL (PRINIVIL,ZESTRIL) 40 MG TABLET    Take 40 mg by mouth.    LORATADINE ORAL    Take by mouth daily as needed.    MELATONIN 5 MG CAP    Take by mouth nightly as needed.    MULTIVIT-MIN/FOLIC/VIT K/LYCOP (MEN'S 50 PLUS MULTIVITAMIN ORAL)    Take by mouth once daily.    MV-MN/IRON/FOLIC ACID/HERB 190 (VITAMIN D3 COMPLETE ORAL)    Take by mouth once daily.    NABUMETONE (RELAFEN) 500 MG TABLET    Take 1 tablet (500 mg total) by mouth once daily.    PSYLLIUM SEED, WITH SUGAR, (FIBER ORAL)     Take by mouth daily as needed.    TAMSULOSIN (FLOMAX) 0.4 MG CAP    TAKE 1 CAPSULE BY MOUTH AT BEDTIME  DAYS.    TRAZODONE (DESYREL) 50 MG TABLET    Take 50 mg by mouth every evening.       Review of patient's allergies indicates:  No Known Allergies      Objective:     Lower Extremity    Right KNEE:  ROM: passive flex/ ext full 0-120 degree  Patella midling, moderate crepitus noted   Ligaments stable  Pain on palpation to medial and lateral aspect  Calf NT, soft  Mild edema present  (-) Bijan sign  DF/PF full  Wiggles toes  Sensation intact to light touch   No pitting edema appreciated   NVI  Cap refill < 2 sec    Left  KNEE:  ROM: passive flex/ ext noted to have roughly 3-5 degrees extension contraction and full flexion of 120°  Patella midling, mild crepitus noted   Ligaments stable  Pain on palpation to medial and lateral aspect  Calf NT, soft   No edema appreciated  (-) Bijan sign  DF/PF full  Wiggles toes  Sensation intact to light touch   No pitting edema appreciated   NVI  Cap refill < 2 sec  Skin warm to touch, no obvious lesion noted       IMAGING:    XRAY:  FINDINGS:  No fractures nor dislocations.  Tricompartmental osteophytes with total medial compartment joint space loss bilaterally.  No joint effusions.  No soft tissue calcifications.        Impression:   Severe tricompartmental osteoarthritis of the bilateral knees with total medial compartment joint space loss bilaterally       Kellgren Car scale : 4     Assessment:       Encounter Diagnoses   Name Primary?    Lumbar radiculopathy     Cervical radiculopathy     Primary osteoarthritis of both knees Yes    Acquired varus deformity knee, right     Acquired varus deformity knee, left           Plan:       Rodolfo was seen today for pain and pain.    Diagnoses and all orders for this visit:    Primary osteoarthritis of both knees    Lumbar radiculopathy  -     X-Ray Lumbar Spine 5 View; Future    Cervical radiculopathy  -     X-Ray Cervical  Spine 2 or 3 Views; Future    Acquired varus deformity knee, right    Acquired varus deformity knee, left        Rodolfo flores is a new patient who presents to me today with chief complaint of bilateral knee pain, right greater than left at times. We had a long discussion today regarding degenerative arthritis in the knees. The patient understands that arthritis is chronic and will worsen over time.  The patient also understands that arthritis may cause episodic flare-ups in pain. Management or if arthritis is achieved through a multi-modal approach including weight loss in obese individuals, activity modification, NSAIDs (topical vs oral) where appropriate, periodic intra-articular steroid injections, viscosupplementation, physical therapy, knee bracing, ambulatory aids, as well as geniculate nerve blocks.  The patient elected to proceed with bilateral short acting steroid injections today.  I asked that he refrain from standing water for 24 hours.  He may advance activity as tolerated with elevation and ice as needed.  I would like to obtain x-rays of cervical and lumbar for further evaluation.  Additionally I would like to place referral for back and spine Clinic.  I would like to see him back in 3 months for further evaluation.  He may call with any questions or concerns in the interim.      Patient verbalized understanding of all instructions and agreed with the above plan.    No follow-ups on file.    The patient understands, chooses and consents to this plan and accepts all   the risks which include but are not limited to the risks mentioned above.     Disclaimer: This note was prepared using a voice recognition system and is likely to have sound alike errors within the text.

## 2023-12-11 NOTE — PROCEDURES
Large Joint Aspiration/Injection: bilateral knee    Date/Time: 12/11/2023 2:00 PM    Performed by: Katelynn Azul PA  Authorized by: Katelynn Azul PA    Consent Done?:  Yes (Verbal)  Indications:  Arthritis, joint swelling and pain  Site marked: the procedure site was marked      Local anesthesia used?: Yes    Local anesthetic:  Topical anesthetic    Details:  Needle Size:  22 G  Approach:  Anterolateral  Location:  Knee  Laterality:  Bilateral  Site:  Bilateral knee  Medications (Right):  5 mL LIDOcaine HCL 10 mg/ml (1%) 10 mg/mL (1 %); 80 mg methylPREDNISolone acetate 80 mg/mL  Medications (Left):  5 mL LIDOcaine HCL 10 mg/ml (1%) 10 mg/mL (1 %); 80 mg methylPREDNISolone acetate 80 mg/mL  Patient tolerance:  Patient tolerated the procedure well with no immediate complications     Verbal consent was obtained  The patient's ID, site, side was verified  The site was sterile prepped in standard fashion  The injection was performed in the cezar-lateral side without complication  A sterile Band-Aid was applied    Patient was directed to apply ice today at roughly 15 minutes at a time as needed.  It was discussed that they may be sore for the next few days or so.  Please avoid strenuous activity over the next 24 hours.  It was also discussed that the patient may have a increase in glucose if diabetic and should monitor levels.  Patient was instructed to call as needed.

## 2023-12-12 ENCOUNTER — TELEPHONE (OUTPATIENT)
Dept: ORTHOPEDICS | Facility: CLINIC | Age: 73
End: 2023-12-12
Payer: MEDICARE

## 2023-12-12 ENCOUNTER — TELEPHONE (OUTPATIENT)
Dept: PAIN MEDICINE | Facility: CLINIC | Age: 73
End: 2023-12-12
Payer: MEDICARE

## 2023-12-12 NOTE — TELEPHONE ENCOUNTER
Returned patient's call for scheduling of Back and Spine referral. No answer. LVM stating that I went on ahead and scheduled him with first available and put him on wait list. If he has any issues with date or time, I left my contact information for him to call me back.  RD

## 2023-12-12 NOTE — TELEPHONE ENCOUNTER
Returned the patient's phone call in regards to their message. Left a message letting them know that I have sent their message to Katelynn and that she is in surgery today and it may be awhile before she can see it.         ----- Message from Nasreen Spears sent at 12/12/2023  2:04 PM CST -----  .Type:  Patient Returning Call    Who Called:.Rodolfo Alvarez   Who Left Message for Patient:  Does the patient know what this is regarding?:XRAY results  Would the patient rather a call back or a response via [facility]? Call back  Best Call Back Number:.[phone]  Additional Information:         denies pain/discomfort

## 2023-12-14 ENCOUNTER — TELEPHONE (OUTPATIENT)
Dept: ORTHOPEDICS | Facility: CLINIC | Age: 73
End: 2023-12-14
Payer: MEDICARE

## 2023-12-14 NOTE — TELEPHONE ENCOUNTER
Returned the patient's phone call. No answer left a message.          ----- Message from ISMAEL Mooney sent at 12/12/2023  3:08 PM CST -----  Hi-    I reviewed the xrays with Dr. Beck  Multiple levels of arthritis and inflammation noted in his spine, lower back greater than neck   Will place referral for the spine team for further evaluation   They may offer a steroid shot into his back as well for relief    ----- Message -----  From: Dorina Sherwood MA  Sent: 12/12/2023   3:07 PM CST  To: ISMAEL Mooney    Please advise lumbar x-rays from yesterday   ----- Message -----  From: Nasreen Spears  Sent: 12/12/2023   2:05 PM CST  To: [facility] Staff    .Type:  Patient Returning Call    Who Called:.Rodolfo Alvarez   Who Left Message for Patient:  Does the patient know what this is regarding?:XRAY results  Would the patient rather a call back or a response via [facility]? Call back  Best Call Back Number:.[phone]  Additional Information:

## 2024-03-11 ENCOUNTER — OFFICE VISIT (OUTPATIENT)
Dept: ORTHOPEDICS | Facility: CLINIC | Age: 74
End: 2024-03-11
Payer: MEDICARE

## 2024-03-11 VITALS
WEIGHT: 225.06 LBS | BODY MASS INDEX: 36.17 KG/M2 | HEIGHT: 66 IN | SYSTOLIC BLOOD PRESSURE: 128 MMHG | DIASTOLIC BLOOD PRESSURE: 74 MMHG

## 2024-03-11 DIAGNOSIS — M21.162 ACQUIRED VARUS DEFORMITY KNEE, LEFT: ICD-10-CM

## 2024-03-11 DIAGNOSIS — M21.161 ACQUIRED VARUS DEFORMITY KNEE, RIGHT: ICD-10-CM

## 2024-03-11 DIAGNOSIS — M17.0 PRIMARY OSTEOARTHRITIS OF BOTH KNEES: Primary | ICD-10-CM

## 2024-03-11 PROCEDURE — 3288F FALL RISK ASSESSMENT DOCD: CPT | Mod: CPTII,S$GLB,, | Performed by: PHYSICIAN ASSISTANT

## 2024-03-11 PROCEDURE — 3078F DIAST BP <80 MM HG: CPT | Mod: CPTII,S$GLB,, | Performed by: PHYSICIAN ASSISTANT

## 2024-03-11 PROCEDURE — 3074F SYST BP LT 130 MM HG: CPT | Mod: CPTII,S$GLB,, | Performed by: PHYSICIAN ASSISTANT

## 2024-03-11 PROCEDURE — 1125F AMNT PAIN NOTED PAIN PRSNT: CPT | Mod: CPTII,S$GLB,, | Performed by: PHYSICIAN ASSISTANT

## 2024-03-11 PROCEDURE — 1159F MED LIST DOCD IN RCRD: CPT | Mod: CPTII,S$GLB,, | Performed by: PHYSICIAN ASSISTANT

## 2024-03-11 PROCEDURE — 4010F ACE/ARB THERAPY RXD/TAKEN: CPT | Mod: CPTII,S$GLB,, | Performed by: PHYSICIAN ASSISTANT

## 2024-03-11 PROCEDURE — 99999 PR PBB SHADOW E&M-EST. PATIENT-LVL IV: CPT | Mod: PBBFAC,,, | Performed by: PHYSICIAN ASSISTANT

## 2024-03-11 PROCEDURE — 99214 OFFICE O/P EST MOD 30 MIN: CPT | Mod: S$GLB,,, | Performed by: PHYSICIAN ASSISTANT

## 2024-03-11 PROCEDURE — 3008F BODY MASS INDEX DOCD: CPT | Mod: CPTII,S$GLB,, | Performed by: PHYSICIAN ASSISTANT

## 2024-03-11 PROCEDURE — 1101F PT FALLS ASSESS-DOCD LE1/YR: CPT | Mod: CPTII,S$GLB,, | Performed by: PHYSICIAN ASSISTANT

## 2024-03-11 RX ORDER — GABAPENTIN 100 MG/1
100 CAPSULE ORAL NIGHTLY
Qty: 30 CAPSULE | Refills: 0 | Status: SHIPPED | OUTPATIENT
Start: 2024-03-11 | End: 2025-03-11

## 2024-03-11 NOTE — PROGRESS NOTES
Patient ID: Rodolfo Alvarez is a 73 y.o. male.    Chief Complaint: Pain of the Right Knee and Pain of the Left Knee      HPI: Rodolfo Alvarez  is a 73 y.o. male who c/o Pain of the Right Knee and Pain of the Left Knee       Patient presents as a new patient to me today with chief complaint of bilateral knee pain.  Patient states this has been ongoing for 20 years or so but has increased quite significantly over the last year.  He states he had a fall roughly 6 weeks ago that resulted in a humerus fracture for which he is being established by our Trauma Department.  He is treated non operatively with conservative measures.  He is using a cane to assist with ambulation.  His son who accompanies him to his appointment states additionally he is started to shuffle his gait.  He notes pain is at worst a 7/10 having a hard time walking long distances, going from a sitting to standing standing to seated position, on level terrain or stairs.  The patient states additionally he has increasing morning stiffness and a very hard time going up stairs for which he needs to be able to accomplish to get into his condo.  He has been using Voltaren gel topically with minimal at best relief along with Tylenol.  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    9/11/2023  The patient presents today for follow-up of bilateral knee pain, left greater than right.  Patient states pain is at worst a 2/10 today.  He states he is a hard time walking long distances and he is compensating more for the right leg he finds.  He is a hard time going from a sitting to standing or standing to seated position, unlevel terrain or stairs are  especially tricky.  Using a cane today to assist with ambulation.  He denies any trauma or falls.  He notes the Depo shot he received 3 months ago is providing further relief and inquires about getting this repeated today.  Additionally he states he does not feel that the Celebrex is helping and he has been alternating this with ibuprofen instead as he feels ibuprofen helps more.  We discussed all medications at length and will make medication changes today.  Additionally he has been using the Voltaren gel although not daily or multiple times throughout the day which we further discussed.    Patient is presently denying any shortness of breath, chest pain, fever/chills, nausea/vomiting, loss of taste or smell, numbness/tingling or sensation changes, loss of bladder or bowel function, loss of taste/smell.     12/11/2023  Patient presents today for follow-up of bilateral knee pain.  He notes the injections he received back in September did provide relief and wore off about a week ago.  He states pain today as 0/10 but can get up to 6/10.  His main complaint is he is noticed progressive weakness to what he is calling the bilateral knees.  The son who accompanies him to his appointments as he has been tripping over his feet a little bit more than normal.  He does use a cane to assist with ambulation and feels he has become more dependent on it.  Furthermore and discussion the patient states he does play flop to use the restroom and can not always control his squatting.  He states he has been dropping the remote more often than not.  He states he is ambulating fine but does have to pay more attention and be conscious of his movements.  He states he had an acute issue with his back in the 90s and had an urgent injection performed and has not seen anyone since then.    He denies any trauma or falls   Patient is presently denying any shortness of breath, chest pain, fever/chills, nausea/vomiting, loss of taste or smell,  numbness/tingling or sensation changes, loss of bladder or bowel function, loss of taste/smell.     3/11/2024    Patient presents today for follow-up of bilateral knee pain, right greater than left.  The patient notes the steroid injections he received in December did work up until about 2-3 weeks ago.  He states he has a hard time walking long distances even with using the cane lately  He has a hard time going from a sitting to standing or standing to seated position, unlevel terrain or stairs.  His son who accompanies him in his appointment today says he still is noticing a semi shuffling of his gait with hunched over appearance.  The patient states he is starting to have electrical shocks to the low back as radiating down the posterior of his legs.        I did discuss with him that I place a referral for pain management department and he does have an appointment that I advised he keep with pain management to discuss KEVIN.  On review of medicines patient states he is stopped taking the Relafen as he prefers taking his ibuprofen  Additionally we discussed adding gabapentin to his regimen.  We will start him on a low dose and monitor.  We may increase as needed.    Additionally he notes he has been started on new medications by both cardiology and neurology and they are monitoring his edema   He is going to be wearing compression socks  They have started him on Flomax we will hold off on any fluid pills at this time  We will defer to their services but do appreciate multidisciplinary approach and care  Past Medical History:   Diagnosis Date    HLD (hyperlipidemia)     Hypertension        History reviewed. No pertinent surgical history.    History reviewed. No pertinent family history.    Social History     Socioeconomic History    Marital status: Single   Tobacco Use    Smoking status: Never    Smokeless tobacco: Never   Substance and Sexual Activity    Alcohol use: Not Currently    Drug use: Never    Sexual  activity: Not Currently       Medication List with Changes/Refills   Current Medications    ACETAMINOPHEN (TYLENOL) 325 MG TABLET    Take 325 mg by mouth every 6 (six) hours as needed for Pain.    AMLODIPINE (NORVASC) 10 MG TABLET    Take 10 mg by mouth.    ASCORBIC ACID (VITAMIN C ORAL)    Take by mouth once daily.    ASPIRIN (ECOTRIN) 81 MG EC TABLET    Take 81 mg by mouth once daily.    ATORVASTATIN (LIPITOR) 20 MG TABLET    Take 20 mg by mouth.    ATORVASTATIN (LIPITOR) 20 MG TABLET    Take 1 tablet by mouth once daily.    CAFFEINE ORAL    Take by mouth daily as needed.    DOCOSAHEXAENOIC ACID/EPA (FISH OIL ORAL)    Take by mouth once daily.    DOCUSATE SODIUM (STOOL SOFTENER ORAL)    Take by mouth daily as needed.    HYDROCODONE-ACETAMINOPHEN (NORCO) 5-325 MG PER TABLET    Take 1 tablet by mouth every 8 (eight) hours as needed for Pain.    IBUPROFEN (ADVIL,MOTRIN) 200 MG TABLET    Take 200 mg by mouth every 6 (six) hours as needed for Pain.    LISINOPRIL (PRINIVIL,ZESTRIL) 40 MG TABLET    Take 40 mg by mouth.    LORATADINE ORAL    Take by mouth daily as needed.    MELATONIN 5 MG CAP    Take by mouth nightly as needed.    MULTIVIT-MIN/FOLIC/VIT K/LYCOP (MEN'S 50 PLUS MULTIVITAMIN ORAL)    Take by mouth once daily.    MV-MN/IRON/FOLIC ACID/HERB 190 (VITAMIN D3 COMPLETE ORAL)    Take by mouth once daily.    NABUMETONE (RELAFEN) 500 MG TABLET    Take 1 tablet (500 mg total) by mouth once daily.    PSYLLIUM SEED, WITH SUGAR, (FIBER ORAL)    Take by mouth daily as needed.    TAMSULOSIN (FLOMAX) 0.4 MG CAP    TAKE 1 CAPSULE BY MOUTH AT BEDTIME  DAYS.    TRAZODONE (DESYREL) 50 MG TABLET    Take 50 mg by mouth every evening.       Review of patient's allergies indicates:  No Known Allergies      Objective:     Lower Extremity    Right KNEE:  ROM: passive flex/ ext full 0-120 degree  Patella midling, moderate crepitus noted   Ligaments stable  Pain on palpation to medial and lateral aspect  Calf NT, soft  Mild  edema present  (-) Bijan sign  DF/PF full  Wiggles toes  Sensation intact to light touch   No pitting edema appreciated   NVI  Cap refill < 2 sec    Left  KNEE:  ROM: passive flex/ ext noted to have roughly 3-5 degrees extension contraction and full flexion of 120°  Patella midling, mild crepitus noted   Ligaments stable  Pain on palpation to medial and lateral aspect  Calf NT, soft   No edema appreciated  (-) Bijan sign  DF/PF full  Wiggles toes  Sensation intact to light touch   No pitting edema appreciated   NVI  Cap refill < 2 sec  Skin warm to touch, no obvious lesion noted       IMAGING:    XRAY:  FINDINGS:  No fractures nor dislocations.  Tricompartmental osteophytes with total medial compartment joint space loss bilaterally.  No joint effusions.  No soft tissue calcifications.        Impression:   Severe tricompartmental osteoarthritis of the bilateral knees with total medial compartment joint space loss bilaterally       Kellgren Car scale : 4     Assessment:       Encounter Diagnoses   Name Primary?    Primary osteoarthritis of both knees Yes    Acquired varus deformity knee, right     Acquired varus deformity knee, left           Plan:       Rodolfo was seen today for pain and pain.    Diagnoses and all orders for this visit:    Primary osteoarthritis of both knees  -     Prior authorization Order    Acquired varus deformity knee, right    Acquired varus deformity knee, left        Rodolfo Alvarez is is a new patient who presents to me today with chief complaint of bilateral knee pain, right greater than left at times. We had a long discussion today regarding degenerative arthritis in the knees. The patient understands that arthritis is chronic and will worsen over time.  The patient also understands that arthritis may cause episodic flare-ups in pain. Management or if arthritis is achieved through a multi-modal approach including weight loss in obese individuals, activity modification, NSAIDs (topical  vs oral) where appropriate, periodic intra-articular steroid injections, viscosupplementation, physical therapy, knee bracing, ambulatory aids, as well as geniculate nerve blocks.  The patient elected to proceed with bilateral short acting steroid injections today.  I asked that he refrain from standing water for 24 hours.  He may advance activity as tolerated with elevation and ice as needed.  Additionally I do believe he had be a great candidate for long-acting steroid as he is seeing results although they are not lasting.  I would like to get him approved for these.  I would like to see him back in 3 months with a hopeful authorization being obtained.  He may call with any questions or concerns in the interim.    Patient verbalized understanding of all instructions and agreed with the above plan.    No follow-ups on file.    The patient understands, chooses and consents to this plan and accepts all   the risks which include but are not limited to the risks mentioned above.     Disclaimer: This note was prepared using a voice recognition system and is likely to have sound alike errors within the text.

## 2024-05-22 ENCOUNTER — OFFICE VISIT (OUTPATIENT)
Dept: PAIN MEDICINE | Facility: CLINIC | Age: 74
End: 2024-05-22
Payer: MEDICARE

## 2024-05-22 VITALS
BODY MASS INDEX: 38.18 KG/M2 | RESPIRATION RATE: 17 BRPM | SYSTOLIC BLOOD PRESSURE: 117 MMHG | DIASTOLIC BLOOD PRESSURE: 76 MMHG | WEIGHT: 237.56 LBS | HEIGHT: 66 IN | HEART RATE: 72 BPM

## 2024-05-22 DIAGNOSIS — G89.29 CHRONIC PAIN OF BOTH KNEES: ICD-10-CM

## 2024-05-22 DIAGNOSIS — M54.12 CERVICAL RADICULOPATHY: ICD-10-CM

## 2024-05-22 DIAGNOSIS — M25.561 CHRONIC PAIN OF BOTH KNEES: ICD-10-CM

## 2024-05-22 DIAGNOSIS — M25.562 CHRONIC PAIN OF BOTH KNEES: ICD-10-CM

## 2024-05-22 DIAGNOSIS — M17.0 PRIMARY OSTEOARTHRITIS OF BOTH KNEES: Primary | ICD-10-CM

## 2024-05-22 DIAGNOSIS — M54.16 LUMBAR RADICULOPATHY: ICD-10-CM

## 2024-05-22 PROCEDURE — 4010F ACE/ARB THERAPY RXD/TAKEN: CPT | Mod: CPTII,S$GLB,, | Performed by: PHYSICAL MEDICINE & REHABILITATION

## 2024-05-22 PROCEDURE — 3074F SYST BP LT 130 MM HG: CPT | Mod: CPTII,S$GLB,, | Performed by: PHYSICAL MEDICINE & REHABILITATION

## 2024-05-22 PROCEDURE — 3288F FALL RISK ASSESSMENT DOCD: CPT | Mod: CPTII,S$GLB,, | Performed by: PHYSICAL MEDICINE & REHABILITATION

## 2024-05-22 PROCEDURE — 1101F PT FALLS ASSESS-DOCD LE1/YR: CPT | Mod: CPTII,S$GLB,, | Performed by: PHYSICAL MEDICINE & REHABILITATION

## 2024-05-22 PROCEDURE — 3008F BODY MASS INDEX DOCD: CPT | Mod: CPTII,S$GLB,, | Performed by: PHYSICAL MEDICINE & REHABILITATION

## 2024-05-22 PROCEDURE — 1125F AMNT PAIN NOTED PAIN PRSNT: CPT | Mod: CPTII,S$GLB,, | Performed by: PHYSICAL MEDICINE & REHABILITATION

## 2024-05-22 PROCEDURE — 99999 PR PBB SHADOW E&M-EST. PATIENT-LVL V: CPT | Mod: PBBFAC,,, | Performed by: PHYSICAL MEDICINE & REHABILITATION

## 2024-05-22 PROCEDURE — 99204 OFFICE O/P NEW MOD 45 MIN: CPT | Mod: S$GLB,,, | Performed by: PHYSICAL MEDICINE & REHABILITATION

## 2024-05-22 PROCEDURE — 3078F DIAST BP <80 MM HG: CPT | Mod: CPTII,S$GLB,, | Performed by: PHYSICAL MEDICINE & REHABILITATION

## 2024-05-22 PROCEDURE — 1159F MED LIST DOCD IN RCRD: CPT | Mod: CPTII,S$GLB,, | Performed by: PHYSICAL MEDICINE & REHABILITATION

## 2024-05-22 NOTE — PROGRESS NOTES
New Patient Chronic Pain Note (Initial Visit)    Referring Physician: Katelynn Azul PA    PCP: Shireen, Primary Doctor    Chief Complaint:   Chief Complaint   Patient presents with    Low-back Pain    Shoulder Pain     Left     Leg Pain     Left and right     Knee Pain     Left and right         SUBJECTIVE:    Rodolfo Alvarez is a 73 y.o. male who presents to the clinic for the evaluation of neck and back pain.  He was referred by Orthopedics for further evaluation and management of this pain.  He has past medical history of hypertension, hyperlipidemia, severe obesity, knee osteoarthritis, and multiple other medical comorbidities as listed in his chart.  He is mostly concerned about his knee pain however.  The pain started 2-3 years ago and symptoms have been worsening.The pain is located in the bilateral knees and reports that this affects his hip and lower back area occasionally.  The pain is described as  sharp, stabbing  and is rated as 8/10. The pain is rated with a score of  7/10 on the BEST day and a score of 10/10 on the WORST day.  Symptoms interfere with daily activity. The pain is exacerbated by weight-bearing activities.  The pain is mitigated by knee injections.     Patient denies night fever/night sweats, urinary incontinence, bowel incontinence, significant weight loss, significant motor weakness, and loss of sensations.    Pain Disability Index Review:      5/22/2024     1:52 PM   Last 3 PDI Scores   Pain Disability Index (PDI) 40       Non-Pharmacologic Treatments:  Physical Therapy/Home Exercise: yes  Ice/Heat:yes  TENS: no  Acupuncture: no  Massage: no  Chiropractic: no    Other: no      Pain Medications:  - Opioids:  Norco  - Adjuvant Medications:  Gabapentin, Tylenol, NSAIDs  - Anti-Coagulants: Aspirin     report:  Reviewed and consistent with medication use as prescribed.    Pain Procedures:   -previous lumbar KEVIN   -previous knee injections      Imaging:   X-ray lumbar spine 12/11/2023:    The vertebral bodies demonstrate a normal height.  There is grade 1 spondylolisthesis of L4 on L5. Prominent bilateral facet arthropathy noted at the L3-4 through the L5-S1 levels.  Mild disc space narrowing seen at the L4-5 and L5-S1 levels.  Vascular calcification seen involving the aorta.     X-ray cervical spine 12/11/2023:  The vertebral bodies demonstrate a normal height and alignment. There is mild disc space narrowing and spondylosis present at the C4-5 level and more moderate disc space narrowing and spondylosis present at C5-6. The C6-7 level is only seen on the swimmer's view although due to rotation and positioning the C6-7 and T1-T2 levels are not as well seen.       Past Medical History:   Diagnosis Date    HLD (hyperlipidemia)     Hypertension      No past surgical history on file.  Social History     Socioeconomic History    Marital status: Single   Tobacco Use    Smoking status: Never    Smokeless tobacco: Never   Substance and Sexual Activity    Alcohol use: Not Currently    Drug use: Never    Sexual activity: Not Currently     No family history on file.    Review of patient's allergies indicates:  No Known Allergies    Current Outpatient Medications   Medication Sig    acetaminophen (TYLENOL) 325 MG tablet Take 325 mg by mouth every 6 (six) hours as needed for Pain.    amLODIPine (NORVASC) 10 MG tablet Take 10 mg by mouth.    ascorbic acid (VITAMIN C ORAL) Take by mouth once daily.    aspirin (ECOTRIN) 81 MG EC tablet Take 81 mg by mouth once daily.    atorvastatin (LIPITOR) 20 MG tablet Take 20 mg by mouth.    atorvastatin (LIPITOR) 20 MG tablet Take 1 tablet by mouth once daily.    CAFFEINE ORAL Take by mouth daily as needed.    docosahexaenoic acid/epa (FISH OIL ORAL) Take by mouth once daily.    docusate sodium (STOOL SOFTENER ORAL) Take by mouth daily as needed.    gabapentin (NEURONTIN) 100 MG capsule Take 1 capsule (100 mg total) by mouth every evening.    HYDROcodone-acetaminophen  "(NORCO) 5-325 mg per tablet Take 1 tablet by mouth every 8 (eight) hours as needed for Pain.    ibuprofen (ADVIL,MOTRIN) 200 MG tablet Take 200 mg by mouth every 6 (six) hours as needed for Pain.    lisinopriL (PRINIVIL,ZESTRIL) 40 MG tablet Take 40 mg by mouth.    LORATADINE ORAL Take by mouth daily as needed.    melatonin 5 mg Cap Take by mouth nightly as needed.    multivit-min/folic/vit K/lycop (MEN'S 50 PLUS MULTIVITAMIN ORAL) Take by mouth once daily.    mv-mn/iron/folic acid/herb 190 (VITAMIN D3 COMPLETE ORAL) Take by mouth once daily.    nabumetone (RELAFEN) 500 MG tablet Take 1 tablet (500 mg total) by mouth once daily.    psyllium seed, with sugar, (FIBER ORAL) Take by mouth daily as needed.    tamsulosin (FLOMAX) 0.4 mg Cap TAKE 1 CAPSULE BY MOUTH AT BEDTIME  DAYS.    traZODone (DESYREL) 50 MG tablet Take 50 mg by mouth every evening.     No current facility-administered medications for this visit.       Review of Systems     GENERAL:  No weight loss, malaise or fevers.  HEENT:   No recent changes in vision or hearing  NECK:  Negative for lumps, no difficulty with swallowing.  RESPIRATORY:  Negative for cough, wheezing or shortness of breath, patient denies any recent URI.  CARDIOVASCULAR:  Negative for chest pain, leg swelling or palpitations.  GI:  Negative for abdominal discomfort, blood in stools or black stools or change in bowel habits.  MUSCULOSKELETAL:  See HPI.  SKIN:  Negative for lesions, rash, and itching.  PSYCH:  No mood disorder or recent psychosocial stressors.  Patients sleep is not disturbed secondary to pain.  HEMATOLOGY/LYMPHOLOGY:  Negative for prolonged bleeding, bruising easily or swollen nodes.  Patient is currently taking anti-coagulants  NEURO:   No history of headaches, syncope, paralysis, seizures or tremors.  All other reviewed and negative other than HPI.    OBJECTIVE:    /76   Pulse 72   Resp 17   Ht 5' 6" (1.676 m)   Wt 107.7 kg (237 lb 8.7 oz)   BMI 38.34 " kg/m²         Physical Exam    GENERAL: Well appearing, in no acute distress, alert and oriented x3.  Obese  PSYCH:  Mood and affect appropriate.  SKIN: Skin color, texture, turgor normal, no rashes or lesions.  HEAD/FACE:  Normocephalic, atraumatic. Cranial nerves grossly intact.  CV: RRR with palpation of the radial artery.  PULM: No evidence of respiratory difficulty, symmetric chest rise.  GI:  Soft and non-tender.  BACK: Straight leg raising in the sitting and supine positions is negative to radicular pain. No pain to palpation over the facet joints of the lumbar spine or spinous processes.  Limited range of motion secondary to pain reproduction.  EXTREMITIES:  No deformities, edema, or skin discoloration. Good capillary refill.  MUSCULOSKELETAL:  Severe pain over the medial joint lines bilaterally.  Varus deformity of both knees.  There is no pain with palpation over the sacroiliac joints bilaterally.  FABERs test is negative.  FADIRs test is negative.   Bilateral upper and lower extremity strength is normal and symmetric.  No atrophy or tone abnormalities are noted.  NEURO: Bilateral upper and lower extremity coordination and muscle stretch reflexes are physiologic and symmetric.  Plantar response are downgoing. No clonus.  No loss of sensation is noted.  GAIT: normal.      LABS:  Lab Results   Component Value Date    WBC 14.30 (H) 03/31/2023    HGB 16.7 03/31/2023    HCT 48.9 03/31/2023    MCV 94 03/31/2023     03/31/2023       CMP  Sodium   Date Value Ref Range Status   03/31/2023 143 136 - 145 mmol/L Final     Potassium   Date Value Ref Range Status   03/31/2023 3.9 3.5 - 5.1 mmol/L Final     Chloride   Date Value Ref Range Status   03/31/2023 107 95 - 110 mmol/L Final     CO2   Date Value Ref Range Status   03/31/2023 23 23 - 29 mmol/L Final     Glucose   Date Value Ref Range Status   03/31/2023 112 (H) 70 - 110 mg/dL Final     BUN   Date Value Ref Range Status   03/31/2023 15 8 - 23 mg/dL Final  "    Creatinine   Date Value Ref Range Status   03/31/2023 0.9 0.5 - 1.4 mg/dL Final     Calcium   Date Value Ref Range Status   03/31/2023 9.2 8.7 - 10.5 mg/dL Final     Total Protein   Date Value Ref Range Status   03/31/2023 7.9 6.0 - 8.4 g/dL Final     Albumin   Date Value Ref Range Status   03/31/2023 3.6 3.5 - 5.2 g/dL Final     Total Bilirubin   Date Value Ref Range Status   03/31/2023 0.6 0.1 - 1.0 mg/dL Final     Comment:     For infants and newborns, interpretation of results should be based  on gestational age, weight and in agreement with clinical  observations.    Premature Infant recommended reference ranges:  Up to 24 hours.............<8.0 mg/dL  Up to 48 hours............<12.0 mg/dL  3-5 days..................<15.0 mg/dL  6-29 days.................<15.0 mg/dL       Alkaline Phosphatase   Date Value Ref Range Status   03/31/2023 94 55 - 135 U/L Final     AST   Date Value Ref Range Status   03/31/2023 19 10 - 40 U/L Final     ALT   Date Value Ref Range Status   03/31/2023 25 10 - 44 U/L Final     Anion Gap   Date Value Ref Range Status   03/31/2023 13 8 - 16 mmol/L Final       No results found for: "LABA1C", "HGBA1C"          ASSESSMENT: 73 y.o. year old male with knee lower back pain, consistent with     1. Primary osteoarthritis of both knees        2. Lumbar radiculopathy  Ambulatory referral[facility]consult to [facility]      3. Cervical radiculopathy  Ambulatory referral/consult to [facility]      4. Chronic pain of both knees              PLAN:   - Interventions:     - Anticoagulation use: Patient is taking ASA as 1° prevention      - Medications: I have stressed the importance of physical activity and a home exercise plan to help with pain and improve health. and Patient can continue with medications for now since they are providing benefits, using them appropriately, and without side effects.         - Therapy:  Continue with activities and therapy as tolerated    - Psychological:  " Discussed coping mechanisms to help address chronic pain issues    - Labs:  Reviewed    - Imaging: Reviewed available imaging with patient and answered any questions they had regarding study.    - Consults/Referrals:  None at this time    - Records:  Reviewed/Obtain old records from outside physicians and imaging    - Follow up visit: return to clinic as needed    - Counseled patient regarding the importance of activity modification and physical therapy    - This condition does not require this patient to take time off of work, and the primary goal of our Pain Management services is to improve the patient's functional capacity.    - Patient Questions: Answered all of the patient's questions regarding diagnosis, therapy, and treatment        The above plan and management options were discussed at length with patient. Patient is in agreement with the above and verbalized understanding.    I discussed the goals of interventional chronic pain management with the patient on today's visit.  I explained the utility of injections for diagnostic and therapeutic purposes.  We discussed a multimodal approach to pain including treating the patient's given worst pain at any given time.  We will use a systematic approach to addressing pain.  We will also adopt a multimodal approach that includes injections, adjuvant medications, physical therapy, at times psychiatry.  There may be a limited role for opioid use intermittently in the treatment of pain, more particularly for acute pain although no one approach can be used as a sole treatment modality.    I emphasized the importance of regular exercise, core strengthening and stretching, diet and weight loss as a cornerstone of long-term pain management.      Antonio Lan MD  Interventional Pain Management  [facility]    Disclaimer:  This note was prepared using voice recognition system and is likely to have sound alike errors that may have been overlooked even after  proof reading.  Please call me with any questions

## 2024-06-17 ENCOUNTER — OFFICE VISIT (OUTPATIENT)
Dept: ORTHOPEDICS | Facility: CLINIC | Age: 74
End: 2024-06-17
Payer: MEDICARE

## 2024-06-17 VITALS — BODY MASS INDEX: 38.16 KG/M2 | WEIGHT: 237.44 LBS | HEIGHT: 66 IN

## 2024-06-17 DIAGNOSIS — M21.161 ACQUIRED VARUS DEFORMITY KNEE, RIGHT: ICD-10-CM

## 2024-06-17 DIAGNOSIS — M17.0 PRIMARY OSTEOARTHRITIS OF BOTH KNEES: Primary | ICD-10-CM

## 2024-06-17 DIAGNOSIS — M21.162 ACQUIRED VARUS DEFORMITY KNEE, LEFT: ICD-10-CM

## 2024-06-17 PROCEDURE — 1159F MED LIST DOCD IN RCRD: CPT | Mod: CPTII,S$GLB,, | Performed by: PHYSICIAN ASSISTANT

## 2024-06-17 PROCEDURE — 3008F BODY MASS INDEX DOCD: CPT | Mod: CPTII,S$GLB,, | Performed by: PHYSICIAN ASSISTANT

## 2024-06-17 PROCEDURE — 20610 DRAIN/INJ JOINT/BURSA W/O US: CPT | Mod: 50,S$GLB,, | Performed by: PHYSICIAN ASSISTANT

## 2024-06-17 PROCEDURE — 4010F ACE/ARB THERAPY RXD/TAKEN: CPT | Mod: CPTII,S$GLB,, | Performed by: PHYSICIAN ASSISTANT

## 2024-06-17 PROCEDURE — 1101F PT FALLS ASSESS-DOCD LE1/YR: CPT | Mod: CPTII,S$GLB,, | Performed by: PHYSICIAN ASSISTANT

## 2024-06-17 PROCEDURE — 1125F AMNT PAIN NOTED PAIN PRSNT: CPT | Mod: CPTII,S$GLB,, | Performed by: PHYSICIAN ASSISTANT

## 2024-06-17 PROCEDURE — 3288F FALL RISK ASSESSMENT DOCD: CPT | Mod: CPTII,S$GLB,, | Performed by: PHYSICIAN ASSISTANT

## 2024-06-17 PROCEDURE — 99213 OFFICE O/P EST LOW 20 MIN: CPT | Mod: 25,S$GLB,, | Performed by: PHYSICIAN ASSISTANT

## 2024-06-17 PROCEDURE — 99999 PR PBB SHADOW E&M-EST. PATIENT-LVL III: CPT | Mod: PBBFAC,,, | Performed by: PHYSICIAN ASSISTANT

## 2024-06-17 RX ORDER — FUROSEMIDE 20 MG/1
20 TABLET ORAL 2 TIMES DAILY
COMMUNITY

## 2024-06-17 NOTE — PROGRESS NOTES
Patient ID: Rodolfo Alvarez is a 73 y.o. male.    Chief Complaint: No chief complaint on file.      HPI: Rodolfo Alvarez  is a 73 y.o. male who c/o No chief complaint on file.       Patient presents as a new patient to me today with chief complaint of bilateral knee pain.  Patient states this has been ongoing for 20 years or so but has increased quite significantly over the last year.  He states he had a fall roughly 6 weeks ago that resulted in a humerus fracture for which he is being established by our Trauma Department.  He is treated non operatively with conservative measures.  He is using a cane to assist with ambulation.  His son who accompanies him to his appointment states additionally he is started to shuffle his gait.  He notes pain is at worst a 7/10 having a hard time walking long distances, going from a sitting to standing standing to seated position, on level terrain or stairs.  The patient states additionally he has increasing morning stiffness and a very hard time going up stairs for which he needs to be able to accomplish to get into his condo.  He has been using Voltaren gel topically with minimal at best relief along with Tylenol.  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    9/11/2023  The patient presents today for follow-up of bilateral knee pain, left greater than right.  Patient states pain is at worst a 2/10 today.  He states he is a hard time walking long distances and he is compensating more for the right leg he finds.  He is a hard time going from a sitting to standing or standing to seated position, unlevel terrain or stairs are especially tricky.  Using a cane today to  assist with ambulation.  He denies any trauma or falls.  He notes the Depo shot he received 3 months ago is providing further relief and inquires about getting this repeated today.  Additionally he states he does not feel that the Celebrex is helping and he has been alternating this with ibuprofen instead as he feels ibuprofen helps more.  We discussed all medications at length and will make medication changes today.  Additionally he has been using the Voltaren gel although not daily or multiple times throughout the day which we further discussed.    Patient is presently denying any shortness of breath, chest pain, fever/chills, nausea/vomiting, loss of taste or smell, numbness/tingling or sensation changes, loss of bladder or bowel function, loss of taste/smell.     12/11/2023  Patient presents today for follow-up of bilateral knee pain.  He notes the injections he received back in September did provide relief and wore off about a week ago.  He states pain today as 0/10 but can get up to 6/10.  His main complaint is he is noticed progressive weakness to what he is calling the bilateral knees.  The son who accompanies him to his appointments as he has been tripping over his feet a little bit more than normal.  He does use a cane to assist with ambulation and feels he has become more dependent on it.  Furthermore and discussion the patient states he does play flop to use the restroom and can not always control his squatting.  He states he has been dropping the remote more often than not.  He states he is ambulating fine but does have to pay more attention and be conscious of his movements.  He states he had an acute issue with his back in the 90s and had an urgent injection performed and has not seen anyone since then.    He denies any trauma or falls   Patient is presently denying any shortness of breath, chest pain, fever/chills, nausea/vomiting, loss of taste or smell, numbness/tingling or sensation changes, loss  of bladder or bowel function, loss of taste/smell.     3/11/2024    Patient presents today for follow-up of bilateral knee pain, right greater than left.  The patient notes the steroid injections he received in December did work up until about 2-3 weeks ago.  He states he has a hard time walking long distances even with using the cane lately  He has a hard time going from a sitting to standing or standing to seated position, unlevel terrain or stairs.  His son who accompanies him in his appointment today says he still is noticing a semi shuffling of his gait with hunched over appearance.  The patient states he is starting to have electrical shocks to the low back as radiating down the posterior of his legs.        I did discuss with him that I place a referral for pain management department and he does have an appointment that I advised he keep with pain management to discuss KEVIN.  On review of medicines patient states he is stopped taking the Relafen as he prefers taking his ibuprofen  Additionally we discussed adding gabapentin to his regimen.  We will start him on a low dose and monitor.  We may increase as needed.    Additionally he notes he has been started on new medications by both cardiology and neurology and they are monitoring his edema   He is going to be wearing compression socks  They have started him on Flomax we will hold off on any fluid pills at this time  We will defer to their services but do appreciate multidisciplinary approach and care    6/17/2024    Patient presents for follow-up bilateral knee pain.  Family who accompanies him today states the short-acting steroid did help but it lasts roughly a month to 5 weeks.  He has been approved for bilateral Zilretta and wishes to proceed.  He has still using a cane to assist with ambulation.    Aside he has been started on a fluid pill by Cardiology and this is helping    Patient is presently denying any shortness of breath, chest pain, fever/chills,  nausea/vomiting, loss of taste or smell, numbness/tingling or sensation changes, loss of bladder or bowel function, loss of taste/smell.     Past Medical History:   Diagnosis Date    HLD (hyperlipidemia)     Hypertension        History reviewed. No pertinent surgical history.    No family history on file.    Social History     Socioeconomic History    Marital status: Single   Tobacco Use    Smoking status: Never    Smokeless tobacco: Never   Substance and Sexual Activity    Alcohol use: Not Currently    Drug use: Never    Sexual activity: Not Currently       Medication List with Changes/Refills   Current Medications    ACETAMINOPHEN (TYLENOL) 325 MG TABLET    Take 325 mg by mouth every 6 (six) hours as needed for Pain.    AMLODIPINE (NORVASC) 10 MG TABLET    Take 10 mg by mouth.    ASCORBIC ACID (VITAMIN C ORAL)    Take by mouth once daily.    ASPIRIN (ECOTRIN) 81 MG EC TABLET    Take 81 mg by mouth once daily.    ATORVASTATIN (LIPITOR) 20 MG TABLET    Take 20 mg by mouth.    ATORVASTATIN (LIPITOR) 20 MG TABLET    Take 1 tablet by mouth once daily.    CAFFEINE ORAL    Take by mouth daily as needed.    DOCOSAHEXAENOIC ACID/EPA (FISH OIL ORAL)    Take by mouth once daily.    DOCUSATE SODIUM (STOOL SOFTENER ORAL)    Take by mouth daily as needed.    GABAPENTIN (NEURONTIN) 100 MG CAPSULE    Take 1 capsule (100 mg total) by mouth every evening.    HYDROCODONE-ACETAMINOPHEN (NORCO) 5-325 MG PER TABLET    Take 1 tablet by mouth every 8 (eight) hours as needed for Pain.    IBUPROFEN (ADVIL,MOTRIN) 200 MG TABLET    Take 200 mg by mouth every 6 (six) hours as needed for Pain.    LISINOPRIL (PRINIVIL,ZESTRIL) 40 MG TABLET    Take 40 mg by mouth.    LORATADINE ORAL    Take by mouth daily as needed.    MELATONIN 5 MG CAP    Take by mouth nightly as needed.    MULTIVIT-MIN/FOLIC/VIT K/LYCOP (MEN'S 50 PLUS MULTIVITAMIN ORAL)    Take by mouth once daily.    MV-MN/IRON/FOLIC ACID/HERB 190 (VITAMIN D3 COMPLETE ORAL)    Take by mouth  once daily.    NABUMETONE (RELAFEN) 500 MG TABLET    Take 1 tablet (500 mg total) by mouth once daily.    PSYLLIUM SEED, WITH SUGAR, (FIBER ORAL)    Take by mouth daily as needed.    TAMSULOSIN (FLOMAX) 0.4 MG CAP    TAKE 1 CAPSULE BY MOUTH AT BEDTIME  DAYS.    TRAZODONE (DESYREL) 50 MG TABLET    Take 50 mg by mouth every evening.       Review of patient's allergies indicates:  No Known Allergies      Objective:     Lower Extremity    Right KNEE:  ROM: passive flex/ ext full 0-120 degree  Patella midling, moderate crepitus noted   Ligaments stable  Pain on palpation to medial and lateral aspect  Calf NT, soft  Mild edema present  (-) Bijan sign  DF/PF full  Wiggles toes  Sensation intact to light touch   No pitting edema appreciated   NVI  Cap refill < 2 sec    Left  KNEE:  ROM: passive flex/ ext noted to have roughly 3-5 degrees extension contraction and full flexion of 120°  Patella midling, mild crepitus noted   Ligaments stable  Pain on palpation to medial and lateral aspect  Calf NT, soft   No edema appreciated  (-) Bijan sign  DF/PF full  Wiggles toes  Sensation intact to light touch   No pitting edema appreciated   NVI  Cap refill < 2 sec  Skin warm to touch, no obvious lesion noted       IMAGING:    XRAY:  FINDINGS:  No fractures nor dislocations.  Tricompartmental osteophytes with total medial compartment joint space loss bilaterally.  No joint effusions.  No soft tissue calcifications.        Impression:   Severe tricompartmental osteoarthritis of the bilateral knees with total medial compartment joint space loss bilaterally       Kellgren Car scale : 4     Assessment:       Encounter Diagnoses   Name Primary?    Primary osteoarthritis of both knees Yes    Acquired varus deformity knee, right     Acquired varus deformity knee, left           Plan:       Diagnoses and all orders for this visit:    Primary osteoarthritis of both knees    Acquired varus deformity knee, right    Acquired varus  deformity knee, left        Rodolfo flores is a new patient who presents to me today with chief complaint of bilateral knee pain, right greater than left at times. We had a long discussion today regarding degenerative arthritis in the knees. The patient understands that arthritis is chronic and will worsen over time.  The patient also understands that arthritis may cause episodic flare-ups in pain. Management or if arthritis is achieved through a multi-modal approach including weight loss in obese individuals, activity modification, NSAIDs (topical vs oral) where appropriate, periodic intra-articular steroid injections, viscosupplementation, physical therapy, knee bracing, ambulatory aids, as well as geniculate nerve blocks.  The patient elected to proceed with bilateral Zilretta steroid injections today.  I asked that he refrain from standing water for 24 hours.  He may advance activity as tolerated with elevation and ice as needed.  The patient wishes to hold off on surgery at this time.  I would like to see him back in 4-6 months.  If he is doing well he may cancel this appointment.  We discussed that these injections may be repeated every 6 months.  Patient verbalized understanding of all instructions and agreed with the above plan.    No follow-ups on file.    The patient understands, chooses and consents to this plan and accepts all   the risks which include but are not limited to the risks mentioned above.     Disclaimer: This note was prepared using a voice recognition system and is likely to have sound alike errors within the text.

## 2024-06-17 NOTE — PROCEDURES
Large Joint Aspiration/Injection: bilateral knee    Date/Time: 6/17/2024 2:00 PM    Performed by: Katelynn Azul PA  Authorized by: Katelynn Azul PA    Consent Done?:  Yes (Verbal)  Indications:  Arthritis, joint swelling and pain  Site marked: the procedure site was marked      Local anesthesia used?: Yes    Local anesthetic:  Topical anesthetic    Details:  Needle Size:  21 G  Approach:  Anterolateral  Location:  Knee  Laterality:  Bilateral  Site:  Bilateral knee  Medications (Right):  32 mg triamcinolone acetonide 32 mg  Medications (Left):  32 mg triamcinolone acetonide 32 mg  Patient tolerance:  Patient tolerated the procedure well with no immediate complications     Verbal consent was obtained  The patient's ID, site, side was verified  The site was sterile prepped in standard fashion  The injection was performed in the cezar-lateral side without complication  A sterile Band-Aid was applied    Patient was directed to apply ice today at roughly 15 minutes at a time as needed.  It was discussed that they may be sore for the next few days or so.  Please avoid strenuous activity over the next 24 hours.  It was also discussed that the patient may have a increase in glucose if diabetic and should monitor levels.  Patient was instructed to call as needed.

## 2024-07-17 ENCOUNTER — TELEPHONE (OUTPATIENT)
Dept: ORTHOPEDICS | Facility: CLINIC | Age: 74
End: 2024-07-17
Payer: MEDICARE

## 2024-07-17 NOTE — TELEPHONE ENCOUNTER
"Spoke with the patient's son Nirmal in regards to their message. Patient son wanted to know if Mrs. Katelynn Azul PA-C could call in Zepbound or the compound form of Zepbound to  the patient's pharmacy to help them loss weight. Informed the patient's son Nirmal that Mrs. Katelynn Azul PA-C can not fill Zepbound and that it would have to come their PCP. Patient's son states that they have asked the patient's PCP and cardiologist to prescribe the Zepbound but they will not fill it. Patient son states that the patient's PCP does not know what they are doing. Patient son Nirmal states that he is to the point to "where he is about to sign on the Zepbound compound script hisself." Informed the patient's son Nirmal that I do not recommend them signing off  on the Zepbound compound script. Patient's son Nirmal stats that he just doesn't understand why Mrs. Katelynn Azul PA-C will sign off on the Zepbound. Informed the patient's son Nirmal again from a Orthopedic standpoint she can not fill the prescription. Verbalized understanding.    "

## 2024-07-17 NOTE — TELEPHONE ENCOUNTER
----- Message from Cata Whitehead sent at 7/17/2024 10:46 AM CDT -----  Contact: Yoan/Son  Patients son is calling to receive a call back at [phone]. Reports wanting to speak further regarding upcoming injection. .

## 2024-08-22 ENCOUNTER — TELEPHONE (OUTPATIENT)
Dept: ORTHOPEDICS | Facility: CLINIC | Age: 74
End: 2024-08-22
Payer: MEDICARE

## 2024-08-22 NOTE — TELEPHONE ENCOUNTER
"Returned patients sons phone call. All questions answered.      Belgica Nichols (Allye), [facility]  Orthopedics Department    "

## 2024-08-22 NOTE — TELEPHONE ENCOUNTER
----- Message from Kelsey Goldberg sent at 8/22/2024 12:07 PM CDT -----    Type:  Needs Medical Advice    Who Called: Yoan   Symptoms (please be specific):    How long has patient had these symptoms:    Pharmacy name and phone #:    Would the patient rather a call back or a response via [facility]?   Best Call Back Number: [phone]  Additional Information:     Yoan Alvarez Called in regards to a knee injection that was perform and was told insurance will pay for knee injection . Needs documents to appeal surgery

## 2024-10-15 DIAGNOSIS — M25.562 PAIN IN BOTH KNEES, UNSPECIFIED CHRONICITY: Primary | ICD-10-CM

## 2024-10-15 DIAGNOSIS — M25.561 PAIN IN BOTH KNEES, UNSPECIFIED CHRONICITY: Primary | ICD-10-CM

## 2024-10-17 DIAGNOSIS — M17.0 PRIMARY OSTEOARTHRITIS OF BOTH KNEES: Primary | ICD-10-CM

## 2024-10-17 DIAGNOSIS — Z01.818 PREOPERATIVE CLEARANCE: Primary | ICD-10-CM

## 2024-10-17 DIAGNOSIS — M17.0 PRIMARY OSTEOARTHRITIS OF BOTH KNEES: ICD-10-CM

## 2024-10-17 NOTE — PROGRESS NOTES
Patient presented today for repeat Zilretta   He received a bill by his insurance company that he was not expecting   He wishes not to proceed with repeat Zilretta injection today   We offered him an appointment with pain management to discuss genicular nerve block versus surgery   Patient would like to move forward with surgery   We got him established to see Dr. Bcek by the end of the month   We will place a referral for Cardiology for clearance

## 2024-10-24 ENCOUNTER — PATIENT MESSAGE (OUTPATIENT)
Dept: ORTHOPEDICS | Facility: CLINIC | Age: 74
End: 2024-10-24
Payer: MEDICARE

## 2024-10-31 ENCOUNTER — OFFICE VISIT (OUTPATIENT)
Dept: ORTHOPEDICS | Facility: CLINIC | Age: 74
End: 2024-10-31
Payer: MEDICARE

## 2024-10-31 ENCOUNTER — HOSPITAL ENCOUNTER (OUTPATIENT)
Dept: RADIOLOGY | Facility: HOSPITAL | Age: 74
Discharge: HOME | End: 2024-10-31
Attending: PHYSICIAN ASSISTANT
Payer: MEDICARE

## 2024-10-31 VITALS — BODY MASS INDEX: 34.55 KG/M2 | WEIGHT: 215 LBS | HEIGHT: 66 IN

## 2024-10-31 DIAGNOSIS — I87.2 EDEMA OF BOTH LOWER LEGS DUE TO PERIPHERAL VENOUS INSUFFICIENCY: ICD-10-CM

## 2024-10-31 DIAGNOSIS — M25.561 PAIN IN BOTH KNEES, UNSPECIFIED CHRONICITY: ICD-10-CM

## 2024-10-31 DIAGNOSIS — R60.0 EDEMA OF BOTH LOWER LEGS DUE TO PERIPHERAL VENOUS INSUFFICIENCY: ICD-10-CM

## 2024-10-31 DIAGNOSIS — M21.162 ACQUIRED VARUS DEFORMITY KNEE, LEFT: ICD-10-CM

## 2024-10-31 DIAGNOSIS — M17.12 ARTHRITIS OF KNEE, LEFT: ICD-10-CM

## 2024-10-31 DIAGNOSIS — M21.161 ACQUIRED VARUS DEFORMITY KNEE, RIGHT: ICD-10-CM

## 2024-10-31 DIAGNOSIS — M17.11 ARTHRITIS OF KNEE, RIGHT: Primary | ICD-10-CM

## 2024-10-31 DIAGNOSIS — M25.562 PAIN IN BOTH KNEES, UNSPECIFIED CHRONICITY: ICD-10-CM

## 2024-10-31 PROCEDURE — 73564 X-RAY EXAM KNEE 4 OR MORE: CPT | Mod: 26,50,, | Performed by: RADIOLOGY

## 2024-10-31 PROCEDURE — 99999 PR PBB SHADOW E&M-EST. PATIENT-LVL IV: CPT | Mod: PBBFAC,,, | Performed by: ORTHOPAEDIC SURGERY

## 2024-10-31 PROCEDURE — 73564 X-RAY EXAM KNEE 4 OR MORE: CPT | Mod: TC,50

## 2024-10-31 RX ORDER — AMLODIPINE BESYLATE 5 MG/1
1 TABLET ORAL EVERY MORNING
COMMUNITY
Start: 2024-10-23

## 2024-10-31 RX ORDER — TIRZEPATIDE 5 MG/.5ML
INJECTION, SOLUTION SUBCUTANEOUS
COMMUNITY

## 2024-11-05 ENCOUNTER — PATIENT MESSAGE (OUTPATIENT)
Dept: ORTHOPEDICS | Facility: CLINIC | Age: 74
End: 2024-11-05
Payer: MEDICARE

## 2024-11-22 ENCOUNTER — PATIENT MESSAGE (OUTPATIENT)
Dept: ORTHOPEDICS | Facility: CLINIC | Age: 74
End: 2024-11-22
Payer: MEDICARE

## 2024-12-11 ENCOUNTER — TELEPHONE (OUTPATIENT)
Dept: ORTHOPEDICS | Facility: CLINIC | Age: 74
End: 2024-12-11
Payer: MEDICARE

## 2024-12-18 ENCOUNTER — TELEPHONE (OUTPATIENT)
Dept: ORTHOPEDICS | Facility: CLINIC | Age: 74
End: 2024-12-18
Payer: MEDICARE

## 2024-12-18 DIAGNOSIS — Z01.818 PREOP TESTING: ICD-10-CM

## 2024-12-18 DIAGNOSIS — Z01.818 PREOPERATIVE EXAMINATION: ICD-10-CM

## 2024-12-18 DIAGNOSIS — M17.11 ARTHRITIS OF KNEE, RIGHT: Primary | ICD-10-CM

## 2024-12-18 NOTE — TELEPHONE ENCOUNTER
----- Message from Nurse Allen sent at 12/18/2024  2:55 PM CST -----  Contact: Yoan/son    ----- Message -----  From: Zena Wild  Sent: 12/18/2024   2:53 PM CST  To: Kiran Zamorano Staff    Yoan would like a call back at [phone], Regards to getting his procedure reschedule for the month of March.    Thanks  Td

## 2024-12-18 NOTE — TELEPHONE ENCOUNTER
Called and spoke with son - surgery set up for 2/11/25    Verbalized understanding and thankful for call     [facility]

## 2024-12-18 NOTE — TELEPHONE ENCOUNTER
Called and spoke with son - sx r/s to 3/18/25 per their request    Verbalized understanding and thankful for call

## 2024-12-18 NOTE — TELEPHONE ENCOUNTER
----- Message from Nurse Allen sent at 12/18/2024 12:00 PM CST -----  Contact: sharmin    ----- Message -----  From: Shazia Roberts  Sent: 12/18/2024  11:55 AM CST  To: Kiran Zamorano Staff    Type:  Needs Medical Advice    Who Called: sharmin  Would the patient rather a call back or a response via [facility]? Call back  Best Call Back Number: [phone]  Additional Information: clearance from cardiology  for surgery / ready to schedule

## 2024-12-19 ENCOUNTER — PATIENT MESSAGE (OUTPATIENT)
Dept: ORTHOPEDICS | Facility: CLINIC | Age: 74
End: 2024-12-19
Payer: MEDICARE

## 2025-02-25 ENCOUNTER — OFFICE VISIT (OUTPATIENT)
Dept: INTERNAL MEDICINE | Facility: CLINIC | Age: 75
End: 2025-02-25
Payer: MEDICARE

## 2025-02-25 ENCOUNTER — HOSPITAL ENCOUNTER (OUTPATIENT)
Dept: CARDIOLOGY | Facility: HOSPITAL | Age: 75
Discharge: HOME | End: 2025-02-25
Attending: ANESTHESIOLOGY
Payer: MEDICARE

## 2025-02-25 ENCOUNTER — HOSPITAL ENCOUNTER (OUTPATIENT)
Dept: RADIOLOGY | Facility: HOSPITAL | Age: 75
Discharge: HOME | End: 2025-02-25
Attending: NURSE PRACTITIONER
Payer: MEDICARE

## 2025-02-25 VITALS — WEIGHT: 192.44 LBS | BODY MASS INDEX: 30.93 KG/M2 | HEIGHT: 66 IN

## 2025-02-25 DIAGNOSIS — Z01.818 PREOPERATIVE EXAMINATION: ICD-10-CM

## 2025-02-25 DIAGNOSIS — E78.5 HYPERLIPIDEMIA, UNSPECIFIED HYPERLIPIDEMIA TYPE: ICD-10-CM

## 2025-02-25 DIAGNOSIS — I10 PRIMARY HYPERTENSION: ICD-10-CM

## 2025-02-25 DIAGNOSIS — G47.00 INSOMNIA, UNSPECIFIED TYPE: ICD-10-CM

## 2025-02-25 DIAGNOSIS — R39.9 LOWER URINARY TRACT SYMPTOMS: ICD-10-CM

## 2025-02-25 DIAGNOSIS — Z01.818 PREOP TESTING: ICD-10-CM

## 2025-02-25 DIAGNOSIS — M17.11 ARTHRITIS OF KNEE, RIGHT: Primary | ICD-10-CM

## 2025-02-25 DIAGNOSIS — G47.30 SLEEP APNEA IN ADULT: ICD-10-CM

## 2025-02-25 DIAGNOSIS — M17.0 PRIMARY OSTEOARTHRITIS OF BOTH KNEES: ICD-10-CM

## 2025-02-25 DIAGNOSIS — Z01.818 PREOP TESTING: Primary | ICD-10-CM

## 2025-02-25 LAB
OHS QRS DURATION: 80 MS
OHS QTC CALCULATION: 427 MS

## 2025-02-25 PROCEDURE — 93010 ELECTROCARDIOGRAM REPORT: CPT | Mod: ,,, | Performed by: INTERNAL MEDICINE

## 2025-02-25 PROCEDURE — 99999 PR PBB SHADOW E&M-EST. PATIENT-LVL II: CPT | Mod: PBBFAC,,,

## 2025-02-25 PROCEDURE — 71046 X-RAY EXAM CHEST 2 VIEWS: CPT | Mod: TC

## 2025-02-25 PROCEDURE — 71046 X-RAY EXAM CHEST 2 VIEWS: CPT | Mod: 26,,, | Performed by: RADIOLOGY

## 2025-02-25 PROCEDURE — 93005 ELECTROCARDIOGRAM TRACING: CPT

## 2025-02-25 NOTE — ASSESSMENT & PLAN NOTE
-BMI 31.06   -patient currently taking Zetia bound with last dose reported on 02/20/2025  -patient advised to hold tirzepatide for 7 days prior to procedure

## 2025-02-25 NOTE — ASSESSMENT & PLAN NOTE
-stable  -ibuprofen prescribed- patient advised to hold all NSAIDs for 5-7 days prior to procedure  -Tylenol continued as needed pain

## 2025-02-25 NOTE — ASSESSMENT & PLAN NOTE
-trazodone continued as needed at night  -patient also takes melatonin as needed  -patient advised to hold all vitamins and supplements for 7 days prior to procedure

## 2025-02-25 NOTE — PROGRESS NOTES
Preoperative History and Physical  Pre-Admit Testing                                                                    Chief Complaint: Preoperative evaluation     History of Present Illness:      Rodolfo Alvarez is a 74 y.o. male who presents to the office today for a preoperative consultation at the request of Dr. Beck who plans on performing aARTHROPLASTY, KNEE, TOTAL (RIGHT)  on March 18.     Functional Status:      The patient is able to climb a flight of stairs slowly. The patient is able to ambulate with use of cane required. The patient's functional status is affected by the surgical problem due to pain and increased mobility. The patient's functional status is not affected by shortness of breath, chest pain, dyspnea on exertion and fatigue.    MET score greater than 4    Patient Anesthesia History:      History of Malignant Hyperthermia: no  History of Pseudocholinesterase Deficiency: no  History of PONV: no  History of difficult intubation: no  History of delayed emergence: no  History of high fever after anesthesia: no    Family Anesthesia History:      History of Malignant Hyperthermia: no  History of Pseudocholinesterase Deficiency: no    Past Medical History:      Past Medical History:   Diagnosis Date    HLD (hyperlipidemia)     Hypertension         Past Surgical History:      Past Surgical History:   Procedure Laterality Date    COLONOSCOPY  2020    SPLENECTOMY      -procedure performed at 6 years        Social History:      Social History     Socioeconomic History    Marital status: Single   Tobacco Use    Smoking status: Never    Smokeless tobacco: Never   Substance and Sexual Activity    Alcohol use: Not Currently    Drug use: Never    Sexual activity: Not Currently        Family History:      Family History   Problem Relation Name Age of Onset    Heart disease Father      Diabetes Father      Hypertension Father      Heart failure Father          Allergies:      Review of patient's allergies indicates:  No Known Allergies    Medications:      Current Outpatient Medications   Medication Sig    acetaminophen (TYLENOL) 325 MG tablet Take 325 mg by mouth every 6 (six) hours as needed for Pain.    amLODIPine (NORVASC) 5 MG tablet Take 1 tablet by mouth every morning.    ascorbic acid (VITAMIN C ORAL) Take by mouth once daily.    aspirin (ECOTRIN) 81 MG EC tablet Take 81 mg by mouth once daily.    atorvastatin (LIPITOR) 20 MG tablet Take 20 mg by mouth.    atorvastatin (LIPITOR) 20 MG tablet Take 1 tablet by mouth once daily.    CAFFEINE ORAL Take by mouth daily as needed.    docosahexaenoic acid/epa (FISH OIL ORAL) Take by mouth once daily.    docusate sodium (STOOL SOFTENER ORAL) Take by mouth daily as needed.    furosemide (LASIX) 20 MG tablet Take 20 mg by mouth 2 (two) times daily.    HYDROcodone-acetaminophen (NORCO) 5-325 mg per tablet Take 1 tablet by mouth every 8 (eight) hours as needed for Pain.    ibuprofen (ADVIL,MOTRIN) 200 MG tablet Take 200 mg by mouth every 6 (six) hours as needed for Pain.    lisinopriL (PRINIVIL,ZESTRIL) 40 MG tablet Take 40 mg by mouth.    LORATADINE ORAL Take by mouth daily as needed.    melatonin 5 mg Cap Take by mouth nightly as needed.    multivit-min/folic/vit K/lycop (MEN'S 50 PLUS MULTIVITAMIN ORAL) Take by mouth once daily.    mv-mn/iron/folic acid/herb 190 (VITAMIN D3 COMPLETE ORAL) Take by mouth once daily.    psyllium seed, with sugar, (FIBER ORAL) Take by mouth daily as needed.    tamsulosin (FLOMAX) 0.4 mg Cap TAKE 1 CAPSULE BY MOUTH AT BEDTIME  DAYS.    tirzepatide, weight loss, (ZEPBOUND) 5 mg/0.5 mL PnIj Inject into the skin every 7 days.    traZODone (DESYREL) 50 MG tablet Take 50 mg by mouth every evening.     No current facility-administered medications for this visit.       Vitals:      There were no vitals filed for this visit.    Review of Systems:        Constitutional: Negative for fever,  chills, weight loss, malaise/fatigue and diaphoresis.   HENT: Negative for hearing loss, ear pain, nosebleeds, congestion, sore throat, neck pain, tinnitus and ear discharge.    Eyes: Negative for blurred vision, double vision, photophobia, pain, discharge and redness.   Respiratory: Negative for cough, hemoptysis, sputum production, shortness of breath, wheezing and stridor.    Cardiovascular: Negative for chest pain, palpitations, orthopnea, claudication, leg swelling and PND.   Gastrointestinal: Negative for heartburn, nausea, vomiting, abdominal pain, diarrhea, constipation, blood in stool and melena.   Genitourinary: Negative for dysuria, urgency, frequency, hematuria and flank pain.   Musculoskeletal: Negative for myalgias, back pain, and falls.  R knee pain   Skin: Negative for itching and rash.   Neurological: Negative for dizziness, tingling, tremors, sensory change, speech change, focal weakness, seizures, loss of consciousness, weakness and headaches.   Endo/Heme/Allergies: Negative for environmental allergies and polydipsia. Does not bruise/bleed easily.   Psychiatric/Behavioral: Negative for depression, suicidal ideas, hallucinations, memory loss and substance abuse. The patient is not nervous/anxious and does not have insomnia.    All 14 systems reviewed and negative except as noted above.    Physical Exam:      Constitutional: Appears well-developed, well-nourished and in no acute distress.  Patient is oriented to person, place, and time.  Antalgic gait noted with cane in use.  Head: Normocephalic and atraumatic. Mucous membranes moist.  Neck: Neck supple no mass.   Cardiovascular: Normal rate and regular rhythm.  S1 S2 appreciated by ascultation.  Pulmonary/Chest: Effort normal and clear to auscultation bilaterally. No respiratory distress.   Abdomen: Soft. Non-tender and non-distended. Bowel sounds are normal.   Neurological: Patient is alert and oriented to person, place and time. Moves all  extremities.  Skin: Warm and dry. No lesions.  Extremities: No clubbing, cyanosis or edema.Limited ROM to R knee     Laboratory data:      Reviewed and noted in plan where applicable. Please see chart for full laboratory data.    Lab Results   Component Value Date    WBC 11.28 02/25/2025    HGB 15.5 02/25/2025    HCT 46.0 02/25/2025    MCV 96 02/25/2025     02/25/2025     COMPREHENSIVE METABOLIC PANEL  Order: [id number]   Ref Range & Units 7 d ago   Glucose <100 mg/dL 81   Blood Urea Nitrogen 8 - 26 mg/dL 18   Creatinine 0.68 - 1.43 mg/dL 0.84   Total Bilirubin 0.2 - 1.2 mg/dL 0.9   Alkaline Phosphatase 40.0 - 150.0 U/L 88   ALT 0 - 55 U/L 15   AST 8 - 48 U/L 24   Total Protein 6.0 - 8.3 g/dL 8   Calcium 8.4 - 10.5 mg/dL 9.7   Sodium 136 - 145 mmol/L 141   Potassium 3.4 - 5.1 mmol/L 4.2   Note:  As of 1/30/24, specimen type for blood electrolyte testing is Plasma   Chloride 98.0 - 111.0 mmol/L 105.5   CO2 TOTAL 23 - 31 mEq/L 29   Albumin 3.2 - 4.6 g/dL 4.2   EGFR mL/min/1.73m^2 92   Resulting Agency  [facility]   Narrative  Performed by [facility]  Glucose: Fasting Normal Range  Reported eGFR is based on the CKD-EPI 2021 equation that does not use a race coefficient.    Reference Range:   >=60 mL/min/1.73m^2      NKF KDOQI and KDIGO guidelines recommend confirming eGFR of 45-59 mL/min/1.73m^2 with uACR <30 mg/g based on eGFR calculated using both creatinine and cystatin C.    Specimen Collected: 02/18/25 14:52    Performed by: [facility] Last Resulted: 02/18/25 15:54   [facility]  Result Received: 02/25/25 06:43    Received Information    V     Predictors of intubation difficulty:       Morbid obesity? no   Anatomically abnormal facies? no   Prominent incisors? no   Receding mandible? no   Short, thick neck? no   Neck range of motion: normal   Dentition: Missing teeth  (Upper L molar x1, lower R molar x 1)  Based on the Modified Mallampati, patient is a mallampati score: II (hard and soft palate, upper portion of tonsils anduvula visible)    Cardiographics:      ECG: Sinus rhythm with 1st degree A-V block   Otherwise normal ECG   When compared with ECG of 31-Mar-2023 09:12,   No significant change was found     Echocardiogram: not indicated    Imaging:      Chest x-ray: Heart size is normal and lungs are clear bilaterally. Pulmonary vasculature is normal. Mild tortuosity of the thoracic aorta is again noted. No evidence of acute disease per imaging on 2/25/2025     Assessment and Plan:      1. Arthritis of knee, right  Assessment & Plan:  ARTHROPLASTY, KNEE, TOTAL (RIGHT) planned per Dr. Beck on 3/18/2025    Known risk factors for perioperative complications:  Sleep Apnea w/ CPAP     Difficulty with intubation is not anticipated.    Cardiac Risk Estimation: Based on the Revised Cardiac Risk index, patient is a Class II risk with a 6 % risk of a major cardiac event in a low risk procedure.    Cardiac evaluation: Cardiac PET perfusion showed no ischemia. Patient cleared for right total knee arthroplasty from cardiac standpoint per Cardiology encounter on 11/20/2024.     1.) Preoperative workup as follows: chest x-ray, ECG, hemoglobin, hematocrit, electrolytes, creatinine, glucose, urinalysis (urinary tract instrumentation planned).  2.) Change in medication regimen before surgery: discontinue ASA 5 days before surgery, discontinue NSAIDs (Ibuprofen) 5 days before surgery, hold Metformin 24 hours prior to surgery. Hold all vitamins/supplements for 7 days prior to surgery, with the exception of Potassium and Iron supplementation. Hold semaglutide/tirzepatide for 7 days prior to surgery.   3.) Prophylaxis for cardiac events with perioperative beta-blockers: not indicated.  4.) Invasive hemodynamic monitoring perioperatively: at the discretion of anesthesiologist.  5.) Deep vein  thrombosis prophylaxis postoperatively: regimen to be chosen by surgical team.  6.) Surveillance for postoperative MI with ECG immediately postoperatively and on postoperati ve days 1 and 2 AND troponin levels 24 hours postoperatively and on day 4 or hospital discharge (whichever comes first): at the discretion of anesthesiologist.  7.) Current medications which may produce withdrawal symptoms if withheld perioperatively: None   8.) Other measures: Postoperative hypertension management with IV hydralazine until able to take oral medications.  Postoperative incentive spirometry to prevent pneumonia.   -Urinalysis obtained with results pending       2. Preoperative examination  -     Ambulatory referral/consult to Pre-Admit  -     X-Ray Chest PA And Lateral; Future; Expected date: 02/25/2025    3. BMI 31.0-31.9,adult  Assessment & Plan:  -BMI 31.06   -patient currently taking Zetia bound with last dose reported on 02/20/2025  -patient advised to hold tirzepatide for 7 days prior to procedure      4. Hyperlipidemia, unspecified hyperlipidemia type  Assessment & Plan:  -stable   -Atorvastatin continued         5. Primary hypertension  Assessment & Plan:  -controlled   -Norvasc continued- take morning of surgery   -Lisinopril continued- hold morning of surgery         6. Primary osteoarthritis of both knees  Assessment & Plan:  -stable  -ibuprofen prescribed- patient advised to hold all NSAIDs for 5-7 days prior to procedure  -Tylenol continued as needed pain      7. Sleep apnea in adult  Assessment & Plan:  -patient reports intermittent compliance CPAP machine at night      8. Insomnia, unspecified type  Assessment & Plan:  -trazodone continued as needed at night  -patient also takes melatonin as needed  -patient advised to hold all vitamins and supplements for 7 days prior to procedure      9. Lower urinary tract symptoms  Assessment & Plan:  -Flomax continued- take morning of surgery               Electronically  signed by Zayda Monzon NP on 2/26/2025 at 10:36 AM.

## 2025-02-25 NOTE — ASSESSMENT & PLAN NOTE
-controlled   -Norvasc continued- take morning of surgery   -Lisinopril continued- hold morning of surgery

## 2025-02-25 NOTE — ASSESSMENT & PLAN NOTE
ARTHROPLASTY, KNEE, TOTAL (RIGHT) planned per Dr. Beck on 3/18/2025    Known risk factors for perioperative complications:  Sleep Apnea w/ CPAP     Difficulty with intubation is not anticipated.    Cardiac Risk Estimation: Based on the Revised Cardiac Risk index, patient is a Class II risk with a 6 % risk of a major cardiac event in a low risk procedure.    Cardiac evaluation: Cardiac PET perfusion showed no ischemia. Patient cleared for right total knee arthroplasty from cardiac standpoint per Cardiology encounter on 11/20/2024.     1.) Preoperative workup as follows: chest x-ray, ECG, hemoglobin, hematocrit, electrolytes, creatinine, glucose, urinalysis (urinary tract instrumentation planned).  2.) Change in medication regimen before surgery: discontinue ASA 5 days before surgery, discontinue NSAIDs (Ibuprofen) 5 days before surgery, hold Metformin 24 hours prior to surgery. Hold all vitamins/supplements for 7 days prior to surgery, with the exception of Potassium and Iron supplementation. Hold semaglutide/tirzepatide for 7 days prior to surgery.   3.) Prophylaxis for cardiac events with perioperative beta-blockers: not indicated.  4.) Invasive hemodynamic monitoring perioperatively: at the discretion of anesthesiologist.  5.) Deep vein thrombosis prophylaxis postoperatively: regimen to be chosen by surgical team.  6.) Surveillance for postoperative MI with ECG immediately postoperatively and on postoperati ve days 1 and 2 AND troponin levels 24 hours postoperatively and on day 4 or hospital discharge (whichever comes first): at the discretion of anesthesiologist.  7.) Current medications which may produce withdrawal symptoms if withheld perioperatively: None   8.) Other measures: Postoperative hypertension management with IV hydralazine until able to take oral medications.  Postoperative incentive spirometry to prevent pneumonia.   -Urinalysis obtained with results pending

## 2025-02-26 ENCOUNTER — LAB VISIT (OUTPATIENT)
Dept: LAB | Facility: HOSPITAL | Age: 75
End: 2025-02-26
Attending: ANESTHESIOLOGY
Payer: MEDICARE

## 2025-02-26 DIAGNOSIS — Z01.818 PREOP TESTING: ICD-10-CM

## 2025-02-26 LAB
BILIRUB UR QL STRIP: NEGATIVE
CLARITY UR REFRACT.AUTO: ABNORMAL
COLOR UR AUTO: YELLOW
GLUCOSE UR QL STRIP: NEGATIVE
HGB UR QL STRIP: NEGATIVE
KETONES UR QL STRIP: NEGATIVE
LEUKOCYTE ESTERASE UR QL STRIP: NEGATIVE
NITRITE UR QL STRIP: NEGATIVE
PH UR STRIP: 6 [PH] (ref 5–8)
PROT UR QL STRIP: ABNORMAL
SP GR UR STRIP: >1.03 (ref 1–1.03)
URN SPEC COLLECT METH UR: ABNORMAL

## 2025-02-26 PROCEDURE — 81003 URINALYSIS AUTO W/O SCOPE: CPT | Performed by: ANESTHESIOLOGY

## 2025-03-07 ENCOUNTER — PATIENT MESSAGE (OUTPATIENT)
Dept: ORTHOPEDICS | Facility: CLINIC | Age: 75
End: 2025-03-07
Payer: MEDICARE